# Patient Record
Sex: MALE | Race: WHITE | NOT HISPANIC OR LATINO | Employment: OTHER | URBAN - METROPOLITAN AREA
[De-identification: names, ages, dates, MRNs, and addresses within clinical notes are randomized per-mention and may not be internally consistent; named-entity substitution may affect disease eponyms.]

---

## 2017-01-06 ENCOUNTER — HISTORICAL (OUTPATIENT)
Dept: ADMINISTRATIVE | Facility: HOSPITAL | Age: 72
End: 2017-01-06

## 2017-01-06 LAB
ALBUMIN SERPL-MCNC: 3.9 G/DL (ref 3.1–4.7)
ALP SERPL-CCNC: 60 IU/L (ref 40–104)
ALT (SGPT): 15 IU/L (ref 3–33)
AST SERPL-CCNC: 22 IU/L (ref 10–40)
BILIRUB SERPL-MCNC: 0.7 MG/DL (ref 0.3–1)
BUN SERPL-MCNC: 19 MG/DL (ref 8–20)
CALCIUM SERPL-MCNC: 9.1 MG/DL (ref 7.7–10.4)
CHLORIDE: 101 MMOL/L (ref 98–110)
CO2 SERPL-SCNC: 27.5 MMOL/L (ref 22.8–31.6)
COMPLEXED PSA SERPL-MCNC: 1.6 NG/ML (ref 0–3)
CREATININE RANDOM URINE: 154 MG/DL
CREATININE: 1.35 MG/DL (ref 0.6–1.4)
GLUCOSE: 98 MG/DL (ref 70–99)
MICROALBUM.,U,RANDOM: 6.1 MCG/ML (ref 0–19.9)
MICROALBUMIN/CREATININE RATIO: 4 (ref 0–30)
POTASSIUM SERPL-SCNC: 3.9 MMOL/L (ref 3.5–5)
PROT SERPL-MCNC: 6.8 G/DL (ref 6–8.2)
SODIUM: 138 MMOL/L (ref 134–144)
TSH SERPL DL<=0.005 MIU/L-ACNC: 3.41 ULU/ML (ref 0.3–5.6)

## 2017-06-26 RX ORDER — LEVOTHYROXINE SODIUM 100 UG/1
100 TABLET ORAL DAILY
Qty: 90 TABLET | Refills: 3 | Status: SHIPPED | OUTPATIENT
Start: 2017-06-26 | End: 2018-06-29 | Stop reason: SDUPTHER

## 2017-06-26 RX ORDER — LOSARTAN POTASSIUM 50 MG/1
1 TABLET ORAL DAILY
COMMUNITY
Start: 2017-01-09 | End: 2017-08-20 | Stop reason: SDUPTHER

## 2017-06-26 RX ORDER — LEVOTHYROXINE SODIUM 100 UG/1
1 TABLET ORAL DAILY
COMMUNITY
Start: 2017-01-09 | End: 2017-06-26 | Stop reason: SDUPTHER

## 2017-07-12 ENCOUNTER — OFFICE VISIT (OUTPATIENT)
Dept: FAMILY MEDICINE | Facility: CLINIC | Age: 72
End: 2017-07-12
Payer: COMMERCIAL

## 2017-07-12 VITALS
BODY MASS INDEX: 23.7 KG/M2 | SYSTOLIC BLOOD PRESSURE: 125 MMHG | HEIGHT: 69 IN | HEART RATE: 74 BPM | WEIGHT: 160 LBS | DIASTOLIC BLOOD PRESSURE: 73 MMHG

## 2017-07-12 DIAGNOSIS — Z11.59 NEED FOR HEPATITIS C SCREENING TEST: ICD-10-CM

## 2017-07-12 DIAGNOSIS — Z12.5 SCREENING FOR PROSTATE CANCER: ICD-10-CM

## 2017-07-12 DIAGNOSIS — I10 BENIGN ESSENTIAL HYPERTENSION: Primary | ICD-10-CM

## 2017-07-12 DIAGNOSIS — E78.00 HYPERCHOLESTEROLEMIA: ICD-10-CM

## 2017-07-12 DIAGNOSIS — E03.9 ACQUIRED HYPOTHYROIDISM: ICD-10-CM

## 2017-07-12 PROBLEM — R60.0 EDEMA OF EXTREMITIES: Status: ACTIVE | Noted: 2017-07-12

## 2017-07-12 PROBLEM — J30.1 HAY FEVER: Status: ACTIVE | Noted: 2017-07-12

## 2017-07-12 PROBLEM — D51.0 ADDISON ANEMIA: Status: ACTIVE | Noted: 2017-07-12

## 2017-07-12 PROBLEM — Z78.9 NON-SMOKER: Status: ACTIVE | Noted: 2017-07-12

## 2017-07-12 PROCEDURE — 1126F AMNT PAIN NOTED NONE PRSNT: CPT | Mod: ,,, | Performed by: INTERNAL MEDICINE

## 2017-07-12 PROCEDURE — 1159F MED LIST DOCD IN RCRD: CPT | Mod: ,,, | Performed by: INTERNAL MEDICINE

## 2017-07-12 PROCEDURE — 99213 OFFICE O/P EST LOW 20 MIN: CPT | Mod: ,,, | Performed by: INTERNAL MEDICINE

## 2017-07-12 NOTE — PATIENT INSTRUCTIONS
Taking Your Blood Pressure  Blood pressure is the force of blood against the artery wall as it moves from the heart through the blood vessels. You can take your own blood pressure reading using a digital monitor. Take readings as often as your healthcare provider instructs. Take each reading at the same time of day.  Step 1. Relax    · Take your blood pressure at the same time every day, such as in the morning or evening, or at the time your healthcare provider recommends.  · Wait at least a half-hour after smoking, eating, drinking caffeinated beverages, or exercising.  · Sit comfortably at a table with both feet on the floor. Do not cross your legs or feet. Place the monitor near you.  · Rest for a few minutes before you begin.  Step 2. Wrap the cuff    · Place your arm on the table, palm up. Your arm should be at the level of your heart. Wrap the cuff around your upper arm, just above your elbow. Its best done on bare skin, not over clothing. Most cuffs will indicate where the brachial artery (the blood vessel in the middle of the arm at the inner side of the elbow) should line up with the cuff. Look in your monitor's instruction booklet for an illustration. You can also bring your cuff to your healthcare provider and have them show you how to correctly place the cuff.  · Make sure your cuff fits. If it doesnt wrap around your upper arm, order a larger cuff.  Step 3. Inflate the cuff    · Pump the cuff until the scale reads 160. If you have a self-inflating cuff, push the button that starts the pump.  · The cuff will tighten, then loosen.  · The numbers will change. When they stop changing, your blood pressure reading will appear.  · Take 2 or 3 readings one minute apart.  Step 4. Write down the results of each reading    · Write down your blood pressure numbers for each reading. Note the date and time. Keep your results in one place, such as a notebook. Even if your monitor has a built-in memory, keep a hard  copy of the readings.  · Remove the cuff from your arm. Turn off the machine.  · Share your blood pressure records with your healthcare providers at each visit.  Date Last Reviewed: 4/27/2016  © 7449-0734 [employer]. [address] [place] [address], [place] [address]. All rights reserved. This information is not intended as a substitute for professional medical care. Always follow your healthcare professional's instructions.

## 2017-07-12 NOTE — PROGRESS NOTES
Subjective:       Patient ID: Jaswinder Reina is a 72 y.o. male.    Chief Complaint: Hypertension and Hypothyroidism    Hypertension   This is a chronic (10 yrs) problem. The current episode started more than 1 year ago. The problem has been gradually improving since onset. The problem is controlled. Pertinent negatives include no anxiety, blurred vision, chest pain, malaise/fatigue, neck pain, palpitations, PND or shortness of breath. There are no associated agents to hypertension. Risk factors for coronary artery disease include male gender. Past treatments include angiotensin blockers. There are no compliance problems.  Hypertensive end-organ damage includes a thyroid problem. There is no history of CAD/MI, CVA, left ventricular hypertrophy or renovascular disease. There is no history of chronic renal disease, hypercortisolism, a hypertension causing med, pheochromocytoma or sleep apnea.   Thyroid Problem   Presents for follow-up visit. Patient reports no anxiety, cold intolerance, constipation, diaphoresis, diarrhea, fatigue, heat intolerance, hoarse voice, leg swelling, palpitations, tremors or visual change. The symptoms have been stable.       Past Medical History:   Diagnosis Date    Hyperlipidemia     Hypertension     Hypothyroidism      Social History     Social History    Marital status: Single     Spouse name: N/A    Number of children: N/A    Years of education: N/A     Occupational History    Not on file.     Social History Main Topics    Smoking status: Former Smoker    Smokeless tobacco: Never Used    Alcohol use Yes    Drug use: No    Sexual activity: Yes     Partners: Female     Other Topics Concern    Not on file     Social History Narrative    No narrative on file     Past Surgical History:   Procedure Laterality Date    TONSILLECTOMY       Family History   Problem Relation Age of Onset    Hypertension Father        Review of Systems   Constitutional: Negative for activity  "change, appetite change, diaphoresis, fatigue, malaise/fatigue and unexpected weight change.   HENT: Negative for congestion, hoarse voice, sneezing and trouble swallowing.    Eyes: Negative for blurred vision, pain and visual disturbance.   Respiratory: Negative for cough, chest tightness and shortness of breath.    Cardiovascular: Negative for chest pain, palpitations, leg swelling and PND.        Patient has hypertension. He also has dyslipidemia but is not on any medications for that.   Gastrointestinal: Negative for abdominal distention, abdominal pain, blood in stool, constipation and diarrhea.        Patient complains of flatulence.   Endocrine: Negative for cold intolerance, heat intolerance, polydipsia, polyphagia and polyuria.        Patient has underlying hypothyroidism.   Genitourinary: Negative for dysuria, hematuria and scrotal swelling.   Musculoskeletal: Positive for arthralgias. Negative for back pain, gait problem and neck pain.        Patient complains of joint pains in the fingers and hands. He takes Aleve occasionally which gives him reasonable relief at least before he plays golf.   Skin: Negative for pallor, rash and wound.   Allergic/Immunologic: Negative for environmental allergies, food allergies and immunocompromised state.   Neurological: Negative for dizziness, tremors, seizures, speech difficulty, light-headedness and numbness.   Hematological: Negative for adenopathy. Does not bruise/bleed easily.   Psychiatric/Behavioral: Negative for agitation, behavioral problems and confusion. The patient is not nervous/anxious.        Objective:       Vitals:    07/12/17 0839   BP: 125/73   Pulse: 74   Weight: 72.6 kg (160 lb)   Height: 5' 9" (1.753 m)     Physical Exam   Constitutional: He is oriented to person, place, and time. He appears well-developed.   HENT:   Head: Normocephalic and atraumatic.   Nose: Nose normal.   Mouth/Throat: Oropharynx is clear and moist. No oropharyngeal exudate. "   Eyes: Conjunctivae and EOM are normal.   Neck: Normal range of motion. Neck supple. No JVD present. No tracheal deviation present. No thyromegaly present.   Cardiovascular: Normal rate, regular rhythm and normal heart sounds.  Exam reveals no gallop and no friction rub.    No murmur heard.  Pulmonary/Chest: Effort normal and breath sounds normal. No respiratory distress. He has no wheezes. He has no rales.   Abdominal: Soft. Bowel sounds are normal. He exhibits no distension. There is no tenderness.   Musculoskeletal: Normal range of motion.   Neurological: He is alert and oriented to person, place, and time.   Skin: Skin is warm and dry.   Psychiatric: He has a normal mood and affect.   Nursing note and vitals reviewed.      Assessment:       1. Benign essential hypertension    2. Hypercholesterolemia    3. Acquired hypothyroidism    4. Screening for prostate cancer    5. Need for hepatitis C screening test         Plan:           Benign essential hypertension  -     Comprehensive metabolic panel; Future; Expected date: 07/12/2017  -     Microalbumin/creatinine urine ratio; Future; Expected date: 07/12/2017    Hypercholesterolemia  -     Lipid panel; Future; Expected date: 07/12/2017    Acquired hypothyroidism  -     TSH; Future; Expected date: 07/12/2017    Screening for prostate cancer  -     PSA, Screening; Future; Expected date: 07/12/2017    Need for hepatitis C screening test  -     Hepatitis C antibody; Future; Expected date: 01/11/2018    Patient is doing fairly well except for mild arthritic pains for which he takes an occasional Aleve. He is busy and active playing golf couple of times a week.    I will see Mr. Reina in 6 months time and review the labs.    He also complains of some flatulence and gas and have advised him to check for labels and check for any artificial sweeteners.    He can also stop lactose containing products for approximately a week and see if it makes any difference.

## 2017-08-20 RX ORDER — LOSARTAN POTASSIUM 50 MG/1
TABLET ORAL
Qty: 90 TABLET | Refills: 3 | Status: SHIPPED | OUTPATIENT
Start: 2017-08-20 | End: 2018-09-02 | Stop reason: SDUPTHER

## 2018-01-10 LAB
ALBUMIN SERPL-MCNC: 4.1 G/DL (ref 3.1–4.7)
ALP SERPL-CCNC: 55 IU/L (ref 40–104)
ALT (SGPT): 28 IU/L (ref 3–33)
AST SERPL-CCNC: 33 IU/L (ref 10–40)
BILIRUB SERPL-MCNC: 1 MG/DL (ref 0.3–1)
BUN SERPL-MCNC: 22 MG/DL (ref 8–20)
CALCIUM SERPL-MCNC: 9 MG/DL (ref 7.7–10.4)
CHLORIDE: 102 MMOL/L (ref 98–110)
CO2 SERPL-SCNC: 27.1 MMOL/L (ref 22.8–31.6)
COMPLEXED PSA SERPL-MCNC: 2.6 NG/ML (ref 0–3)
CREATININE RANDOM URINE: 180 MG/DL
CREATININE: 1.5 MG/DL (ref 0.6–1.4)
GLUCOSE: 93 MG/DL (ref 70–99)
MICROALBUM.,U,RANDOM: 5.7 MCG/ML (ref 0–19.9)
MICROALBUMIN/CREATININE RATIO: 3 (ref 0–30)
POTASSIUM SERPL-SCNC: 4 MMOL/L (ref 3.5–5)
PROT SERPL-MCNC: 7.2 G/DL (ref 6–8.2)
SODIUM: 137 MMOL/L (ref 134–144)
T4 FREE SP9 P CHAL SERPL-SCNC: 0.99 NG/DL (ref 0.45–1.27)
TSH SERPL DL<=0.005 MIU/L-ACNC: 5.98 ULU/ML (ref 0.3–5.6)

## 2018-01-11 ENCOUNTER — TELEPHONE (OUTPATIENT)
Dept: FAMILY MEDICINE | Facility: CLINIC | Age: 73
End: 2018-01-11

## 2018-01-11 LAB — HCV AB SERPL QL IA: <0.1 S/CO RATIO (ref 0–0.9)

## 2018-01-11 NOTE — TELEPHONE ENCOUNTER
----- Message from Kameron Akhtar MD sent at 1/10/2018  3:54 PM CST -----  Keep followup to discuss slightly abnormal labs including thyroid and kidney

## 2018-01-15 ENCOUNTER — OFFICE VISIT (OUTPATIENT)
Dept: FAMILY MEDICINE | Facility: CLINIC | Age: 73
End: 2018-01-15
Payer: COMMERCIAL

## 2018-01-15 VITALS
HEIGHT: 69 IN | HEART RATE: 70 BPM | DIASTOLIC BLOOD PRESSURE: 69 MMHG | SYSTOLIC BLOOD PRESSURE: 138 MMHG | BODY MASS INDEX: 23.55 KG/M2 | WEIGHT: 159 LBS

## 2018-01-15 DIAGNOSIS — I10 BENIGN ESSENTIAL HYPERTENSION: Primary | ICD-10-CM

## 2018-01-15 DIAGNOSIS — E03.8 OTHER SPECIFIED HYPOTHYROIDISM: ICD-10-CM

## 2018-01-15 PROCEDURE — 99213 OFFICE O/P EST LOW 20 MIN: CPT | Mod: ,,, | Performed by: INTERNAL MEDICINE

## 2018-01-15 NOTE — PATIENT INSTRUCTIONS
Hypothyroidism       You have hypothyroidism. This means your thyroid gland is not making enough thyroid hormone. This hormone is vital to body growth and metabolism. If you dont make enough, many body processes slow down. This can cause symptoms throughout the body. Hypothyroidism can range from mild to severe. The most severe form is called myxedema.  There are a number of causes of hypothyroidism. A common cause is Hashimotos disease. This disease causes the bodys own immune system to attack the thyroid gland. When you have certain treatments, such as surgery to remove the thyroid gland, this can also cause hypothyroidism.  Symptoms of hypothyroidism can include:  · Fatigue  · Trouble concentrating or thinking clearly; forgetfulness  · Dry skin  · Hair loss  · Weight gain  · Low tolerance to cold  · Constipation  · Depression  · Personality changes  · Tingling or prickling of the hands or feet  · Heavy, absent, or irregular periods (women only)  Older adults may sometimes have other symptoms. These can include:  · Muscle aches and weakness  · Confusion  · Incontinence (unable to control urine or stool)  · Trouble moving around  · Falling  Treatment for hypothyroidism involves taking thyroid hormone pills daily. These pills replace the hormone your thyroid doesnt make. You will likely need to take a daily pill for the rest of your life. Tips for taking this medicine are given below.  Home care  Tips for taking your medicine  · Take your thyroid hormone pills as prescribed by your healthcare provider. This is most often 1 pill a day on an empty stomach. Use a pillbox labeled with the days of the week. This will help you remember to take your pill each day.  · Dont take products that contain iron and calcium or antacids within 4 hours of taking your thyroid hormone pills.  · Dont take other medicines with your thyroid hormone pill without checking with your provider first.  · Tell your provider if you have  any side effects from your medicines that bother you.  · Never change the dosage or stop taking your thyroid pills without talking to your provider first.  General care  · Always talk with your provider before trying other medicines or treatments for your thyroid problem.  · If you see other healthcare providers, be sure to let them know about your thyroid problem.  Follow-up care  See your healthcare provider for checkups as advised. You may need regular tests to check the level of thyroid hormone in your blood.  When to seek medical advice  Call your healthcare provider right away if any of these occur:  · New symptoms develop  · Symptoms return, continue, or worsen even after treatment  · Extreme fatigue  · Puffy hands, face, or feet  · Fast or irregular heartbeat  · Confusion  Call 911  Call 911 right away if any of these occur:  · Fainting  · Chest pain  · Shortness of breath or trouble breathing  Date Last Reviewed: 8/24/2015  © 2545-7822 [employer]. [address] [place] [address], [place] [address]. All rights reserved. This information is not intended as a substitute for professional medical care. Always follow your healthcare professional's instructions.

## 2018-01-15 NOTE — PROGRESS NOTES
Subjective:       Patient ID: Jaswinder Reina is a 72 y.o. male.    Chief Complaint: Hypertension (lab review ) and Thyroid Problem    Mr. Jaswinder Reina is a pleasant 72-year-old  male who comes for follow-up. He has underlying hypertension and hypothyroidism. He is compliant with his medications for blood pressure as well as the thyroid.    Generally, on overall basis he feels good and he is active participant playing golf.    His exercise tolerance is fair. He states that his memory is reasonable for his accomplishments and age. He does have mild arthritic symptoms which is able to tolerate well and sometimes it disturbs his sleep at night. He tends to put a wrist brace which gives him some relief.    I recently checked his labs which are within reasonable range including hepatitis C screening. His TSH is slightly elevated with a free normal free T4. I will continue with the same medications for another 6 months to one year before I make any decision to change.    Patient's labs also include creatinine which is 1.5. This is slightly elevated. We will keep an eye on this level.      Hypertension   This is a chronic problem. The current episode started more than 1 year ago. The problem has been gradually improving since onset. Pertinent negatives include no anxiety, chest pain, malaise/fatigue, neck pain, palpitations, peripheral edema or shortness of breath. There are no associated agents to hypertension. There are no known risk factors for coronary artery disease. Past treatments include angiotensin blockers. Identifiable causes of hypertension include a thyroid problem. There is no history of chronic renal disease, coarctation of the aorta or pheochromocytoma.   Thyroid Problem   Presents for follow-up visit. Symptoms include cold intolerance (better now). Patient reports no anxiety, constipation, depressed mood, diaphoresis, diarrhea, fatigue, hair loss, heat intolerance, hoarse voice, leg swelling,  palpitations, tremors or visual change.       Past Medical History:   Diagnosis Date    Hyperlipidemia     Hypertension     Hypothyroidism      Social History     Social History    Marital status: Single     Spouse name: N/A    Number of children: N/A    Years of education: N/A     Occupational History    Not on file.     Social History Main Topics    Smoking status: Former Smoker    Smokeless tobacco: Never Used    Alcohol use Yes    Drug use: No    Sexual activity: Yes     Partners: Female     Other Topics Concern    Not on file     Social History Narrative    No narrative on file     Past Surgical History:   Procedure Laterality Date    TONSILLECTOMY       Family History   Problem Relation Age of Onset    Hypertension Father        Review of Systems   Constitutional: Negative for activity change, appetite change, diaphoresis, fatigue, malaise/fatigue and unexpected weight change.   HENT: Negative for congestion, hoarse voice, sneezing and trouble swallowing.    Eyes: Negative for pain and visual disturbance.   Respiratory: Negative for cough, chest tightness and shortness of breath.    Cardiovascular: Negative for chest pain, palpitations and leg swelling.        Patient has hypertension. He also has dyslipidemia but is not on any medications for that.   Gastrointestinal: Negative for abdominal distention, abdominal pain, blood in stool, constipation and diarrhea.        Patient complains of flatulence. Now better.   Endocrine: Positive for cold intolerance (better now). Negative for heat intolerance, polydipsia, polyphagia and polyuria.        Patient has underlying hypothyroidism.   Genitourinary: Negative for dysuria, hematuria and scrotal swelling.   Musculoskeletal: Positive for arthralgias. Negative for back pain, gait problem and neck pain.        Patient complains of joint pains in the fingers and hands. He takes Aleve occasionally which gives him reasonable relief at least before he plays  "golf.   Skin: Negative for pallor, rash and wound.   Allergic/Immunologic: Negative for environmental allergies, food allergies and immunocompromised state.   Neurological: Negative for dizziness, tremors, seizures, speech difficulty, light-headedness and numbness.   Hematological: Negative for adenopathy. Does not bruise/bleed easily.   Psychiatric/Behavioral: Negative for agitation, behavioral problems and confusion. The patient is not nervous/anxious.        Objective:       Vitals:    01/15/18 1534   BP: 138/69   Pulse: 70   Weight: 72.1 kg (159 lb)   Height: 5' 9" (1.753 m)     Physical Exam   Constitutional: He appears well-developed.   HENT:   Head: Normocephalic and atraumatic.   Nose: Nose normal.   Mouth/Throat: Oropharynx is clear and moist. No oropharyngeal exudate.   Eyes: Conjunctivae and EOM are normal.   Neck: Normal range of motion. Neck supple. No JVD present. No tracheal deviation present. No thyromegaly present.   Cardiovascular: Normal rate, regular rhythm and normal heart sounds.  Exam reveals no gallop and no friction rub.    No murmur heard.  Pulmonary/Chest: Effort normal and breath sounds normal. No respiratory distress. He has no wheezes. He has no rales.   Abdominal: Soft. Bowel sounds are normal. He exhibits no distension. There is no tenderness.   Musculoskeletal: Normal range of motion.   Neurological: He is alert.   Skin: Skin is warm and dry.   Psychiatric: He has a normal mood and affect.   Nursing note and vitals reviewed.      Assessment:       1. Benign essential hypertension    2. Other specified hypothyroidism         Plan:           Benign essential hypertension  -     Basic metabolic panel; Future; Expected date: 01/15/2018    Other specified hypothyroidism  -     TSH; Future; Expected date: 01/15/2018      Current Outpatient Prescriptions on File Prior to Visit   Medication Sig Dispense Refill    levothyroxine (SYNTHROID) 100 MCG tablet Take 1 tablet (100 mcg total) by " mouth once daily at 6am. 90 tablet 3    losartan (COZAAR) 50 MG tablet 1 EVERY EVENING 90 tablet 3     No current facility-administered medications on file prior to visit.      Patient's medications have been reviewed. His labs have been reviewed. TSH is slightly elevated but will continue with the same prescription. If necessary I might have to increase it subsequently in case the TSH shows a upward trend.    I will see him back in 6 months time and repeat a TSH.    In the meantime I complement him for continuing his exercise and playing golf. Creatinine was also slightly elevated and I'll encourage him to continue remain hydrated.

## 2018-06-29 RX ORDER — LEVOTHYROXINE SODIUM 100 UG/1
100 TABLET ORAL
Qty: 90 TABLET | Refills: 3 | Status: SHIPPED | OUTPATIENT
Start: 2018-06-29 | End: 2019-06-30 | Stop reason: SDUPTHER

## 2018-07-13 LAB
BUN SERPL-MCNC: 29 MG/DL (ref 8–20)
CALCIUM SERPL-MCNC: 8.9 MG/DL (ref 7.7–10.4)
CHLORIDE: 105 MMOL/L (ref 98–110)
CO2 SERPL-SCNC: 24.9 MMOL/L (ref 22.8–31.6)
CREATININE: 1.67 MG/DL (ref 0.6–1.4)
GLUCOSE: 90 MG/DL (ref 70–99)
POTASSIUM SERPL-SCNC: 4.2 MMOL/L (ref 3.5–5)
SODIUM: 139 MMOL/L (ref 134–144)
TSH SERPL DL<=0.005 MIU/L-ACNC: 0.32 ULU/ML (ref 0.3–5.6)

## 2018-07-18 ENCOUNTER — OFFICE VISIT (OUTPATIENT)
Dept: FAMILY MEDICINE | Facility: CLINIC | Age: 73
End: 2018-07-18
Payer: COMMERCIAL

## 2018-07-18 VITALS
BODY MASS INDEX: 22.66 KG/M2 | SYSTOLIC BLOOD PRESSURE: 125 MMHG | WEIGHT: 153 LBS | HEART RATE: 62 BPM | HEIGHT: 69 IN | DIASTOLIC BLOOD PRESSURE: 74 MMHG | RESPIRATION RATE: 16 BRPM | OXYGEN SATURATION: 97 %

## 2018-07-18 DIAGNOSIS — N28.9 ABNORMAL KIDNEY FUNCTION: ICD-10-CM

## 2018-07-18 DIAGNOSIS — E03.8 OTHER SPECIFIED HYPOTHYROIDISM: ICD-10-CM

## 2018-07-18 DIAGNOSIS — I10 BENIGN ESSENTIAL HYPERTENSION: Primary | ICD-10-CM

## 2018-07-18 PROCEDURE — 3074F SYST BP LT 130 MM HG: CPT | Mod: ,,, | Performed by: INTERNAL MEDICINE

## 2018-07-18 PROCEDURE — 3078F DIAST BP <80 MM HG: CPT | Mod: ,,, | Performed by: INTERNAL MEDICINE

## 2018-07-18 PROCEDURE — 99213 OFFICE O/P EST LOW 20 MIN: CPT | Mod: ,,, | Performed by: INTERNAL MEDICINE

## 2018-07-18 NOTE — PATIENT INSTRUCTIONS
Living with High Blood Pressure and Kidney Disease  By lowering high blood pressure, you can reduce the amount of damage to your kidneys, and help slow any progression of kidney disease. Visit your healthcare provider as scheduled and follow the tips below.    Eat right  To control blood pressure and kidney disease:  · Limit salt (sodium) intake. Your sodium limit is 1,500 mg a day. Sodium makes up 40% of table salt, which is also called sodium chloride. So 1,500 mg of sodium equals 3,800 mg of salt. It's very important to read and understand this difference when reading food labels. The following guide will make it easy to understand how much sodium is in different amounts of salt:  ¨ 1/4 teaspoon salt = 600 mg sodium  ¨ 1/2 teaspoon salt = 1,200 mg sodium  ¨ 3/4 teaspoon salt = 1,800 mg sodium  ¨ 1 teaspoon salt = 2,400 mg sodium  · Cook with spices and herbs instead of salt.  · Eat fresh foods instead of canned or processed ones.  · Eat less fat. Avoid fats that come from animal sources.  · Choose low-fat dairy foods.  You may also need to  · Eat less protein.  · Drink less fluid.  · Eat foods that are low in phosphorus and potassium.  Stay active  Regular activity helps reduce high blood pressure. For best results:  · Talk with your healthcare provider before starting a fitness program. Your provider may be able to suggest activities that will help you feel your best.  · Ask your healthcare provider how often you should exercise and for how long.  · Try to exercise at the same time each day.  Date Last Reviewed: 2/1/2017  © 6891-3757 [employer]. [address] [place] [address], [place] [address]. All rights reserved. This information is not intended as a substitute for professional medical care. Always follow your healthcare professional's instructions.

## 2018-07-18 NOTE — PROGRESS NOTES
Subjective:       Patient ID: Jaswinder Reina is a 73 y.o. male.    Chief Complaint: Results; Hypertension; and Thyroid Problem    Mr. Jaswinder Reina is a pleasant 72-year-old  male who comes for follow-up. He has underlying hypertension and hypothyroidism. He is compliant with his medications for blood pressure as well as the thyroid.    Generally, on overall basis he feels good and he is active participant playing golf.    His exercise tolerance is fair. He states that his memory is reasonable for his accomplishments and age. He does have mild arthritic symptoms which is able to tolerate well and sometimes it disturbs his sleep at night. He tends to put a wrist brace which gives him some relief.    I recently checked his labs which are within reasonable range including hepatitis C screening. His TSH is slightly elevated with a free normal free T4. I will continue with the same medications for another 6 months to one year before I make any decision to change.    Patient's labs also include creatinine which is 1.5. This is slightly elevated. We will keep an eye on this level.      Hypertension   This is a chronic problem. The current episode started more than 1 year ago. The problem has been gradually improving since onset. The problem is controlled. Pertinent negatives include no anxiety, chest pain, malaise/fatigue, neck pain, palpitations, peripheral edema or shortness of breath. There are no associated agents to hypertension. Risk factors for coronary artery disease include sedentary lifestyle and male gender. Past treatments include angiotensin blockers. Identifiable causes of hypertension include chronic renal disease and a thyroid problem. There is no history of coarctation of the aorta or pheochromocytoma.   Thyroid Problem   Presents for follow-up (Recent TSH is within normal range.) visit. Symptoms include cold intolerance (better now). Patient reports no anxiety, constipation, depressed mood,  "diaphoresis, diarrhea, fatigue, hair loss, heat intolerance, hoarse voice, leg swelling, palpitations, tremors or visual change. The symptoms have been stable.       Past Medical History:   Diagnosis Date    Hyperlipidemia     Hypertension     Hypothyroidism      Social History     Social History    Marital status: Single     Spouse name: N/A    Number of children: N/A    Years of education: N/A     Occupational History    Not on file.     Social History Main Topics    Smoking status: Former Smoker    Smokeless tobacco: Never Used    Alcohol use Yes    Drug use: No    Sexual activity: Yes     Partners: Female     Other Topics Concern    Not on file     Social History Narrative    No narrative on file     Past Surgical History:   Procedure Laterality Date    TONSILLECTOMY       Family History   Problem Relation Age of Onset    Hypertension Father        Review of Systems   Constitutional: Negative for diaphoresis, fatigue and malaise/fatigue.   HENT: Negative for hoarse voice.    Respiratory: Negative for shortness of breath.    Cardiovascular: Negative for chest pain and palpitations.   Gastrointestinal: Negative for constipation and diarrhea.   Endocrine: Positive for cold intolerance (better now). Negative for heat intolerance.   Musculoskeletal: Negative for neck pain.   Neurological: Negative for tremors.   Psychiatric/Behavioral: The patient is not nervous/anxious.        Objective:       Vitals:    07/18/18 0942   BP: 125/74   Pulse: 62   Resp: 16   SpO2: 97%   Weight: 69.4 kg (153 lb)   Height: 5' 9" (1.753 m)     Physical Exam   Constitutional: He appears well-developed.   HENT:   Head: Normocephalic and atraumatic.   Nose: Nose normal.   Mouth/Throat: Oropharynx is clear and moist. No oropharyngeal exudate.   Eyes: Conjunctivae and EOM are normal.   Neck: Normal range of motion. Neck supple. No JVD present. No tracheal deviation present. No thyromegaly present.   Cardiovascular: Normal rate, " regular rhythm and normal heart sounds.  Exam reveals no gallop and no friction rub.    No murmur heard.  Pulmonary/Chest: Effort normal and breath sounds normal. No respiratory distress. He has no wheezes. He has no rales.   Abdominal: Soft. Bowel sounds are normal. He exhibits no distension. There is no tenderness.   Neurological: He is alert.   Skin: Skin is warm and dry.   Psychiatric: He has a normal mood and affect.   Nursing note and vitals reviewed.      Assessment:       1. Benign essential hypertension    2. Other specified hypothyroidism    3. Abnormal kidney function         Ref Range & Units 5d ago 6mo ago 1yr ago 9yr ago 10yr ago 12yr ago 13yr ago     TSH 0.30 - 5.60 ulU/ml 0.32  5.98   3.41  0.971R  1.5R  3.3R  1.1R    Basic metabolic panel   Order: [id number]   Status:  Final result   Visible to patient:  No (Not Released)   Next appt:  None    Ref Range & Units 5d ago   Glucose 70 - 99 mg/dL 90    BUN, Bld 8 - 20 mg/dL 29     Creatinine 0.60 - 1.40 mg/dL 1.67     Calcium 7.7 - 10.4 mg/dL 8.9    Sodium 134 - 144 mmol/L 139    Potassium 3.5 - 5.0 mmol/L 4.2    Chloride 98 - 110 mmol/L 105    CO2 22.8 - 31.6 mmol/L 24.9    Resulting Agency  [facility], Screening   Order: [id number] - Reflex for Order [id number]   Status:  Final result   Visible to patient:  No (Not Released) Next appt:  None   Notes Recorded by Kameron Akhtar MD on 1/10/2018 at 3:54 PM CST  Keep followup to discuss slightly abnormal labs including thyroid and kidney     Ref Range & Units 6mo ago 1yr ago 9yr ago 10yr ago 12yr ago 13yr ago 14yr ago    PSA, SCREEN 0.0 - 3.0 ng/mL 2.6  1.6  1.1R, CM  0.8R  1.2R  1.1R  1.0R                 Plan:           Benign essential hypertension    Other specified hypothyroidism    Abnormal kidney function    Mr. Reina is generally doing okay. His blood pressures are under fair control. He is compliant with his medications including the thyroid medications. His TSH is within range.    His  kidney functions do show some elevation.    His PSA also had shown some elevation but he does not have any prostate symptoms.    Clinical examination is okay.      I will repeat the kidney functions and 3 time with intact PTH and phosphorus level and see how he does.    In the meantime he'll keep himself hydrated.      He inquires me about the new shingrix vaccine and he had the shingles vaccine in past.    Official the the new shingles vaccine is accepted and prescribed but I will reserve my personal opinion at this point.    He will also be due for colonoscopy given the last one in 5 years. I would like to wait for another 3 months to 6 months before we are sure about his kidney functions.    I'm okay to see him in about 6 months time for follow-up but I would like him to check his labs in 3 months time.

## 2018-09-02 RX ORDER — LOSARTAN POTASSIUM 50 MG/1
TABLET ORAL
Qty: 90 TABLET | Refills: 3 | Status: SHIPPED | OUTPATIENT
Start: 2018-09-02 | End: 2019-09-15 | Stop reason: SDUPTHER

## 2018-10-25 LAB
ALBUMIN SERPL-MCNC: 4 G/DL (ref 3.1–4.7)
BUN SERPL-MCNC: 27 MG/DL (ref 8–20)
CALCIUM SERPL-MCNC: 9.3 MG/DL (ref 7.7–10.4)
CHLORIDE: 102 MMOL/L (ref 98–110)
CO2 SERPL-SCNC: 28.7 MMOL/L (ref 22.8–31.6)
CREATININE: 1.53 MG/DL (ref 0.6–1.4)
GLUCOSE: 89 MG/DL (ref 70–99)
PHOSPHATE FLD-MCNC: 4.3 MG/DL (ref 2.5–4.9)
POTASSIUM SERPL-SCNC: 4.7 MMOL/L (ref 3.5–5)
SODIUM: 136 MMOL/L (ref 134–144)

## 2019-04-01 ENCOUNTER — OFFICE VISIT (OUTPATIENT)
Dept: FAMILY MEDICINE | Facility: CLINIC | Age: 74
End: 2019-04-01
Payer: COMMERCIAL

## 2019-04-01 VITALS
HEART RATE: 80 BPM | WEIGHT: 158 LBS | DIASTOLIC BLOOD PRESSURE: 76 MMHG | BODY MASS INDEX: 23.4 KG/M2 | HEIGHT: 69 IN | SYSTOLIC BLOOD PRESSURE: 159 MMHG

## 2019-04-01 DIAGNOSIS — N28.9 ABNORMAL KIDNEY FUNCTION: ICD-10-CM

## 2019-04-01 DIAGNOSIS — E03.9 ACQUIRED HYPOTHYROIDISM: ICD-10-CM

## 2019-04-01 DIAGNOSIS — E78.00 HYPERCHOLESTEROLEMIA: ICD-10-CM

## 2019-04-01 DIAGNOSIS — Z12.11 SCREEN FOR COLON CANCER: ICD-10-CM

## 2019-04-01 DIAGNOSIS — I10 BENIGN ESSENTIAL HYPERTENSION: Primary | ICD-10-CM

## 2019-04-01 PROCEDURE — 3078F PR MOST RECENT DIASTOLIC BLOOD PRESSURE < 80 MM HG: ICD-10-PCS | Mod: ,,, | Performed by: INTERNAL MEDICINE

## 2019-04-01 PROCEDURE — 3078F DIAST BP <80 MM HG: CPT | Mod: ,,, | Performed by: INTERNAL MEDICINE

## 2019-04-01 PROCEDURE — 3077F SYST BP >= 140 MM HG: CPT | Mod: ,,, | Performed by: INTERNAL MEDICINE

## 2019-04-01 PROCEDURE — 99214 PR OFFICE/OUTPT VISIT, EST, LEVL IV, 30-39 MIN: ICD-10-PCS | Mod: ,,, | Performed by: INTERNAL MEDICINE

## 2019-04-01 PROCEDURE — 99214 OFFICE O/P EST MOD 30 MIN: CPT | Mod: ,,, | Performed by: INTERNAL MEDICINE

## 2019-04-01 PROCEDURE — 1101F PT FALLS ASSESS-DOCD LE1/YR: CPT | Mod: ,,, | Performed by: INTERNAL MEDICINE

## 2019-04-01 PROCEDURE — 3077F PR MOST RECENT SYSTOLIC BLOOD PRESSURE >= 140 MM HG: ICD-10-PCS | Mod: ,,, | Performed by: INTERNAL MEDICINE

## 2019-04-01 PROCEDURE — 1101F PR PT FALLS ASSESS DOC 0-1 FALLS W/OUT INJ PAST YR: ICD-10-PCS | Mod: ,,, | Performed by: INTERNAL MEDICINE

## 2019-04-01 NOTE — PATIENT INSTRUCTIONS
Taking Your Blood Pressure  Blood pressure is the force of blood against the artery wall as it moves from the heart through the blood vessels. You can take your own blood pressure reading using a digital monitor. Take your readings the same each time, using the same arm. Take readings as often as your healthcare provider instructs.  About blood pressure monitors  Blood pressure monitors are designed for certain ages and cases. You can find monitors for older adults, for pregnant women, and for children. Make sure the one you choose is the right one for your age and situation.  The American Heart Association recommends an automatic cuff monitor that fits on your upper arm (bicep). The cuff should fit your arm size. A cuff thats too large or too small will not give an accurate reading. Measure around your upper arm to find your size.  Monitors that attach to your finger or wrist are not as accurate as monitors for your upper arm.  Ask your healthcare provider for help in choosing a monitor. Bring your monitor to your next provider visit if you need help in using it the correct way.  The steps below are general instructions for using an automatic digital monitor.  Step 1. Relax    · Take your blood pressure at the same time every day, such as in the morning or evening, or at the time your healthcare provider recommends.  · Wait at least a half-hour after smoking, eating, or exercising. Don't drink coffee, tea, soda, or other caffeinated beverages before checking your blood pressure.  · Sit comfortably at a table with both feet on the floor. Do not cross your legs or feet. Place the monitor near you.  · Rest for a few minutes before you begin.  Step 2. Wrap the cuff    · Place your arm on the table, palm up. Your arm should be at the level of your heart. Wrap the cuff around your upper arm, just above your elbow. Its best done on bare skin, not over clothing. Most cuffs will indicate where the brachial artery (the  blood vessel in the middle of the arm at the inner side of the elbow) should line up with the cuff. Look in your monitor's instruction booklet for an illustration. You can also bring your cuff to your healthcare provider and have them show you how to correctly place the cuff.  Step 3. Inflate the cuff    · Push the button that starts the pump.  · The cuff will tighten, then loosen.  · The numbers will change. When they stop changing, your blood pressure reading will appear.  · Take 2 or 3 readings one minute apart.  Step 4. Write down the results of each reading    · Write down your blood pressure numbers for each reading. Note the date and time. Keep your results in one place, such as a notebook. Even if your monitor has a built-in memory, keep a hard copy of the readings.  · Remove the cuff from your arm. Turn off the machine.  · Bring your blood pressure records with your healthcare providers at each visit.  · If you start a new blood pressure medicine, note the day you started the new medicine. Also note the day if you change the dose of your medicine. This information goes on your blood pressure recording sheet. This will help your healthcare provider monitor how well the medicine changes are working.  · Ask your healthcare provider what numbers should prompt you to call him or her. Also ask what numbers should prompt you to get help right away.  Date Last Reviewed: 11/1/2016 © 2000-2017 [employer]. [address] [place] [id number]. All rights reserved. This information is not intended as a substitute for professional medical care. Always follow your healthcare professional's instructions.        Diet for Chronic Kidney Disease  Following a special diet when you have kidney disease can help you stay as healthy as possible. Your healthcare provider or dietitian should make a special diet plan just for you.    Eating right  Here are some good eating rules to follow:  · Protein. Eating  protein is important for your body. But too much protein can put a strain on your kidneys. Eating less protein may slow the progression of chronic kidney disease. Foods high in protein include meat, fish, eggs, cheese, and other dairy products. A registered dietitian can help you plan a diet that has the right amount of protein for you.  · Sodium. Having too much salt in your diet can make your body hold onto (retain) water. Ask your provider or dietitian how much sodium per day you are allowed. This will help you avoid fluid buildup in your body (fluid retention). It can also help control high blood pressure. Learn to read food labels to know how much sodium is in one serving. Foods high in salt include processed meats, canned and boxed foods, sauces, salted chips and snacks, pickled foods, frozen dinners, and restaurant and fast food.  · Fluids. If you have advanced kidney disease, you will need to limit the water and fluids you drink. If you dont, then too much water will build up in your body. The exact amount of fluid you can drink depends on how well your kidneys are working. Ask your provider how much water you can safely drink each day.  · Potassium. In advanced kidney disease, your potassium level can go dangerously high. This affects your heart. It can cause an irregular heartbeat (arrhythmia). Ask your provider or dietitian if you should limit potassium in your diet. Foods high in potassium include dairy products (milk, yogurt, cheese), dried beans, bananas, oranges, potatoes, tomatoes, spinach, cantaloupe, honeydew melon, dried fruits, and nuts.   · Calcium. Calcium is important to build strong bones. But foods high in calcium are also high in phosphorus, which can take calcium from your bones. Limiting foods high in phosphorus will help keep calcium in your bones. Ask your provider how much calcium you should get each day.  · Phosphorus. In advanced kidney disease, your phosphorus level can go  dangerously high. This affects many systems in the body and can damage your heart. Limit your intake of phosphorus-rich foods. These include dried beans and peas, nuts, peanut butter, cocoa, beer, cola drinks, and dairy products.  Date Last Reviewed: 8/1/2016  © 7401-1368 [employer]. [address] [place] [address], [place] [address]. All rights reserved. This information is not intended as a substitute for professional medical care. Always follow your healthcare professional's instructions.

## 2019-04-01 NOTE — PROGRESS NOTES
Subjective:       Patient ID: Jaswinder Reina is a 74 y.o. male.    Chief Complaint: Thyroid Problem; Hypertension; and Hyperlipidemia    Hypertension   This is a chronic problem. The current episode started more than 1 year ago. The problem is uncontrolled. Pertinent negatives include no chest pain, neck pain, palpitations or shortness of breath. Risk factors for coronary artery disease include sedentary lifestyle. Past treatments include angiotensin blockers. Identifiable causes of hypertension include a thyroid problem. There is no history of coarctation of the aorta, hyperaldosteronism, pheochromocytoma or renovascular disease.   Thyroid Problem   Presents for follow-up visit. Symptoms include cold intolerance (better now). Patient reports no anxiety, constipation, diaphoresis, diarrhea, fatigue, heat intolerance, leg swelling, menstrual problem, palpitations, tremors or weight gain. The symptoms have been stable. His past medical history is significant for hyperlipidemia.   Hyperlipidemia   This is a chronic problem. The current episode started more than 1 year ago. The problem is controlled. Exacerbating diseases include obesity. Pertinent negatives include no chest pain or shortness of breath. He is currently on no antihyperlipidemic treatment. The current treatment provides no improvement of lipids. Risk factors for coronary artery disease include hypertension and dyslipidemia.       Past Medical History:   Diagnosis Date    Hyperlipidemia     Hypertension     Hypothyroidism      Social History     Socioeconomic History    Marital status: Single     Spouse name: Not on file    Number of children: Not on file    Years of education: Not on file    Highest education level: Not on file   Occupational History    Not on file   Social Needs    Financial resource strain: Not on file    Food insecurity:     Worry: Not on file     Inability: Not on file    Transportation needs:     Medical: Not on file      "Non-medical: Not on file   Tobacco Use    Smoking status: Former Smoker    Smokeless tobacco: Never Used   Substance and Sexual Activity    Alcohol use: Yes    Drug use: No    Sexual activity: Yes     Partners: Female   Lifestyle    Physical activity:     Days per week: Not on file     Minutes per session: Not on file    Stress: Only a little   Relationships    Social connections:     Talks on phone: Not on file     Gets together: Not on file     Attends Mu-ism service: Not on file     Active member of club or organization: Not on file     Attends meetings of clubs or organizations: Not on file     Relationship status: Not on file    Intimate partner violence:     Fear of current or ex partner: Not on file     Emotionally abused: Not on file     Physically abused: Not on file     Forced sexual activity: Not on file   Other Topics Concern    Not on file   Social History Narrative    Not on file     Past Surgical History:   Procedure Laterality Date    TONSILLECTOMY       Family History   Problem Relation Age of Onset    Hypertension Father        Review of Systems   Constitutional: Negative for diaphoresis, fatigue and weight gain.   Respiratory: Negative for shortness of breath.    Cardiovascular: Negative for chest pain and palpitations.   Gastrointestinal: Negative for constipation and diarrhea.   Endocrine: Positive for cold intolerance (better now). Negative for heat intolerance.   Genitourinary: Negative for menstrual problem.   Musculoskeletal: Negative for neck pain.   Neurological: Negative for tremors.   Psychiatric/Behavioral: The patient is not nervous/anxious.          Objective:      Blood pressure (!) 159/76, pulse 80, height 5' 9" (1.753 m), weight 71.7 kg (158 lb). Body mass index is 23.33 kg/m².  Physical Exam   Constitutional: He appears well-developed.   HENT:   Head: Normocephalic and atraumatic.   Nose: Nose normal.   Mouth/Throat: Oropharynx is clear and moist. No oropharyngeal " exudate.   Eyes: Conjunctivae and EOM are normal.   Neck: Normal range of motion. Neck supple. No JVD present. No tracheal deviation present. No thyromegaly present.   Cardiovascular: Normal rate, regular rhythm and normal heart sounds. Exam reveals no gallop and no friction rub.   No murmur heard.  Pulmonary/Chest: Effort normal and breath sounds normal. No respiratory distress. He has no wheezes. He has no rales.   Abdominal: Soft. Bowel sounds are normal. He exhibits no distension. There is no tenderness.   Neurological: He is alert.   Skin: Skin is warm and dry.   Psychiatric: He has a normal mood and affect.   Nursing note and vitals reviewed.        Assessment:       1. Benign essential hypertension    2. Abnormal kidney function    3. Hypercholesterolemia    4. Acquired hypothyroidism    5. Screen for colon cancer           No visits with results within 3 Month(s) from this visit.   Latest known visit with results is:   Orders Only on 10/25/2018   Component Date Value Ref Range Status    Glucose 10/25/2018 89  70 - 99 mg/dL Final    Sodium 10/25/2018 136  134 - 144 mmol/L Final    Potassium 10/25/2018 4.7  3.5 - 5.0 mmol/L Final    Chloride 10/25/2018 102  98 - 110 mmol/L Final    CO2 10/25/2018 28.7  22.8 - 31.6 mmol/L Final    BUN, Bld 10/25/2018 27* 8 - 20 mg/dL Final    Calcium 10/25/2018 9.3  7.7 - 10.4 mg/dL Final    Creatinine 10/25/2018 1.53* 0.60 - 1.40 mg/dL Final    Albumin 10/25/2018 4.0  3.1 - 4.7 g/dL Final    Phosphorus 10/25/2018 4.3  2.5 - 4.9 mg/dL Final         Plan:           Benign essential hypertension    Abnormal kidney function    Hypercholesterolemia    Acquired hypothyroidism    Screen for colon cancer  -     Cologuard Screening (Multitarget Stool DNA); Future; Expected date: 04/01/2019      Patient has been advised to watch diet and exercise. Avoid fried and fatty food. Compliance to medications and follow up urged.    The patient is asked to make an attempt to improve  diet and exercise patterns to aid in medical management of this problem.    Advised Mr. Reina about age and season appropriate immunizations/ cancer screenings.  Also seasonal influenza vaccine, update on tetanus diphtheria vaccination every 10 years.      Patient's blood pressures are somewhat elevated today. He has not monitored his blood pressures at home    Follow-up in 6 weeks to review his blood pressures and make changes if needed.      Current Outpatient Medications:     levothyroxine (SYNTHROID) 100 MCG tablet, Take 1 tablet (100 mcg total) by mouth before breakfast., Disp: 90 tablet, Rfl: 3    losartan (COZAAR) 50 MG tablet, 1 EVERY EVENING, Disp: 90 tablet, Rfl: 3

## 2019-05-09 ENCOUNTER — TELEPHONE (OUTPATIENT)
Dept: FAMILY MEDICINE | Facility: CLINIC | Age: 74
End: 2019-05-09

## 2019-05-09 DIAGNOSIS — R19.5 POSITIVE COLORECTAL CANCER SCREENING USING COLOGUARD TEST: Primary | ICD-10-CM

## 2019-05-13 ENCOUNTER — OFFICE VISIT (OUTPATIENT)
Dept: FAMILY MEDICINE | Facility: CLINIC | Age: 74
End: 2019-05-13
Payer: COMMERCIAL

## 2019-05-13 VITALS
SYSTOLIC BLOOD PRESSURE: 128 MMHG | WEIGHT: 154 LBS | HEART RATE: 77 BPM | HEIGHT: 69 IN | BODY MASS INDEX: 22.81 KG/M2 | DIASTOLIC BLOOD PRESSURE: 68 MMHG

## 2019-05-13 DIAGNOSIS — I10 BENIGN ESSENTIAL HYPERTENSION: ICD-10-CM

## 2019-05-13 DIAGNOSIS — E03.9 ACQUIRED HYPOTHYROIDISM: ICD-10-CM

## 2019-05-13 DIAGNOSIS — R19.5 POSITIVE COLORECTAL CANCER SCREENING USING COLOGUARD TEST: Primary | ICD-10-CM

## 2019-05-13 PROCEDURE — 3074F PR MOST RECENT SYSTOLIC BLOOD PRESSURE < 130 MM HG: ICD-10-PCS | Mod: ,,, | Performed by: INTERNAL MEDICINE

## 2019-05-13 PROCEDURE — 99213 OFFICE O/P EST LOW 20 MIN: CPT | Mod: ,,, | Performed by: INTERNAL MEDICINE

## 2019-05-13 PROCEDURE — 1101F PR PT FALLS ASSESS DOC 0-1 FALLS W/OUT INJ PAST YR: ICD-10-PCS | Mod: ,,, | Performed by: INTERNAL MEDICINE

## 2019-05-13 PROCEDURE — 1101F PT FALLS ASSESS-DOCD LE1/YR: CPT | Mod: ,,, | Performed by: INTERNAL MEDICINE

## 2019-05-13 PROCEDURE — 3078F DIAST BP <80 MM HG: CPT | Mod: ,,, | Performed by: INTERNAL MEDICINE

## 2019-05-13 PROCEDURE — 3078F PR MOST RECENT DIASTOLIC BLOOD PRESSURE < 80 MM HG: ICD-10-PCS | Mod: ,,, | Performed by: INTERNAL MEDICINE

## 2019-05-13 PROCEDURE — 99213 PR OFFICE/OUTPT VISIT, EST, LEVL III, 20-29 MIN: ICD-10-PCS | Mod: ,,, | Performed by: INTERNAL MEDICINE

## 2019-05-13 PROCEDURE — 3074F SYST BP LT 130 MM HG: CPT | Mod: ,,, | Performed by: INTERNAL MEDICINE

## 2019-05-13 NOTE — PATIENT INSTRUCTIONS
Taking Your Blood Pressure  Blood pressure is the force of blood against the artery wall as it moves from the heart through the blood vessels. You can take your own blood pressure reading using a digital monitor. Take your readings the same each time, using the same arm. Take readings as often as your healthcare provider instructs.  About blood pressure monitors  Blood pressure monitors are designed for certain ages and cases. You can find monitors for older adults, for pregnant women, and for children. Make sure the one you choose is the right one for your age and situation.  The American Heart Association recommends an automatic cuff monitor that fits on your upper arm (bicep). The cuff should fit your arm size. A cuff thats too large or too small will not give an accurate reading. Measure around your upper arm to find your size.  Monitors that attach to your finger or wrist are not as accurate as monitors for your upper arm.  Ask your healthcare provider for help in choosing a monitor. Bring your monitor to your next provider visit if you need help in using it the correct way.  The steps below are general instructions for using an automatic digital monitor.  Step 1. Relax    · Take your blood pressure at the same time every day, such as in the morning or evening, or at the time your healthcare provider recommends.  · Wait at least a half-hour after smoking, eating, or exercising. Don't drink coffee, tea, soda, or other caffeinated beverages before checking your blood pressure.  · Sit comfortably at a table with both feet on the floor. Do not cross your legs or feet. Place the monitor near you.  · Rest for a few minutes before you begin.  Step 2. Wrap the cuff    · Place your arm on the table, palm up. Your arm should be at the level of your heart. Wrap the cuff around your upper arm, just above your elbow. Its best done on bare skin, not over clothing. Most cuffs will indicate where the brachial artery (the  blood vessel in the middle of the arm at the inner side of the elbow) should line up with the cuff. Look in your monitor's instruction booklet for an illustration. You can also bring your cuff to your healthcare provider and have them show you how to correctly place the cuff.  Step 3. Inflate the cuff    · Push the button that starts the pump.  · The cuff will tighten, then loosen.  · The numbers will change. When they stop changing, your blood pressure reading will appear.  · Take 2 or 3 readings one minute apart.  Step 4. Write down the results of each reading    · Write down your blood pressure numbers for each reading. Note the date and time. Keep your results in one place, such as a notebook. Even if your monitor has a built-in memory, keep a hard copy of the readings.  · Remove the cuff from your arm. Turn off the machine.  · Bring your blood pressure records with your healthcare providers at each visit.  · If you start a new blood pressure medicine, note the day you started the new medicine. Also note the day if you change the dose of your medicine. This information goes on your blood pressure recording sheet. This will help your healthcare provider monitor how well the medicine changes are working.  · Ask your healthcare provider what numbers should prompt you to call him or her. Also ask what numbers should prompt you to get help right away.  Date Last Reviewed: 11/1/2016  © 5476-0942 [employer]. [address] [place] [address], [place] [address]. All rights reserved. This information is not intended as a substitute for professional medical care. Always follow your healthcare professional's instructions.

## 2019-05-13 NOTE — PROGRESS NOTES
Subjective:       Patient ID: Jaswinder Reina is a 74 y.o. male.    Chief Complaint: Hypertension and Thyroid Problem    Mr. Pedro Luis Reina is a pleasant 74-year-old  gentleman who comes for follow-up. Underlying issues of hypertension, hypothyroidism have been noted. He is taking his medications as prescribed and his blood pressures are under control. TSH is done last year was within normal range. He has slightly elevated creatinine chronically which seems to be stable at this point. PTH levels are okay.    Recently Cologuard screening test was positive. He has scheduled an appointment for Dr. Chan for colonoscopy. He informs me that he has multiple polyps in past and apparently they give rise to positive test all the time.        Hypertension   This is a chronic problem. The current episode started more than 1 year ago. The problem is uncontrolled. Pertinent negatives include no chest pain, neck pain, palpitations or shortness of breath. Risk factors for coronary artery disease include sedentary lifestyle. Past treatments include angiotensin blockers. Identifiable causes of hypertension include a thyroid problem. There is no history of coarctation of the aorta, hyperaldosteronism, pheochromocytoma or renovascular disease.   Thyroid Problem   Presents for follow-up visit. Symptoms include cold intolerance (better now). Patient reports no anxiety, constipation, diaphoresis, diarrhea, fatigue, heat intolerance, leg swelling, menstrual problem, palpitations, tremors or weight gain. The symptoms have been stable. His past medical history is significant for hyperlipidemia.   Hyperlipidemia   This is a chronic problem. The current episode started more than 1 year ago. The problem is controlled. Exacerbating diseases include obesity. Pertinent negatives include no chest pain or shortness of breath. He is currently on no antihyperlipidemic treatment. The current treatment provides no improvement of lipids. Risk  factors for coronary artery disease include hypertension and dyslipidemia.       Past Medical History:   Diagnosis Date    Hyperlipidemia     Hypertension     Hypothyroidism      Social History     Socioeconomic History    Marital status: Single     Spouse name: Not on file    Number of children: Not on file    Years of education: Not on file    Highest education level: Not on file   Occupational History    Not on file   Social Needs    Financial resource strain: Not on file    Food insecurity:     Worry: Not on file     Inability: Not on file    Transportation needs:     Medical: Not on file     Non-medical: Not on file   Tobacco Use    Smoking status: Former Smoker    Smokeless tobacco: Never Used   Substance and Sexual Activity    Alcohol use: Yes    Drug use: No    Sexual activity: Yes     Partners: Female   Lifestyle    Physical activity:     Days per week: Not on file     Minutes per session: Not on file    Stress: Only a little   Relationships    Social connections:     Talks on phone: Not on file     Gets together: Not on file     Attends Jew service: Not on file     Active member of club or organization: Not on file     Attends meetings of clubs or organizations: Not on file     Relationship status: Not on file   Other Topics Concern    Not on file   Social History Narrative    Not on file     Past Surgical History:   Procedure Laterality Date    TONSILLECTOMY       Family History   Problem Relation Age of Onset    Hypertension Father        Review of Systems   Constitutional: Negative for diaphoresis, fatigue and weight gain.   HENT: Negative for congestion, ear pain, postnasal drip and sore throat.    Eyes: Negative for discharge and itching.   Respiratory: Negative for apnea, chest tightness and shortness of breath.    Cardiovascular: Negative for chest pain and palpitations.   Gastrointestinal: Negative for abdominal distention, abdominal pain, anal bleeding, constipation  "and diarrhea.        Recent Cologuard testing was positive.   Endocrine: Positive for cold intolerance (better now). Negative for heat intolerance.        Hypothyroidism currently stable on levothyroxine.   Genitourinary: Negative for difficulty urinating, dysuria, enuresis, frequency, menstrual problem, penile pain and testicular pain.   Musculoskeletal: Negative for neck pain.   Neurological: Negative for tremors.   Psychiatric/Behavioral: Negative for agitation and behavioral problems. The patient is not nervous/anxious.          Objective:      Blood pressure 128/68, pulse 77, height 5' 9" (1.753 m), weight 69.9 kg (154 lb). Body mass index is 22.74 kg/m².  Physical Exam   Constitutional: He appears well-developed.   HENT:   Head: Normocephalic and atraumatic.   Nose: Nose normal.   Mouth/Throat: Oropharynx is clear and moist. No oropharyngeal exudate.   Eyes: Conjunctivae and EOM are normal.   Neck: Normal range of motion. Neck supple. No JVD present. No tracheal deviation present. No thyromegaly present.   Cardiovascular: Normal rate, regular rhythm and normal heart sounds. Exam reveals no gallop and no friction rub.   No murmur heard.  Pulmonary/Chest: Effort normal and breath sounds normal. No respiratory distress. He has no wheezes. He has no rales.   Abdominal: Soft. Bowel sounds are normal. He exhibits no distension. There is no tenderness.   Musculoskeletal: He exhibits no edema, tenderness or deformity.   Neurological: He is alert.   Skin: Skin is warm and dry.   Psychiatric: He has a normal mood and affect.   Nursing note and vitals reviewed.        Assessment:       1. Positive colorectal cancer screening using Cologuard test    2. Benign essential hypertension    3. Acquired hypothyroidism           No visits with results within 3 Month(s) from this visit.   Latest known visit with results is:   Orders Only on 10/25/2018   Component Date Value Ref Range Status    Glucose 10/25/2018 89  70 - 99 mg/dL " Final    Sodium 10/25/2018 136  134 - 144 mmol/L Final    Potassium 10/25/2018 4.7  3.5 - 5.0 mmol/L Final    Chloride 10/25/2018 102  98 - 110 mmol/L Final    CO2 10/25/2018 28.7  22.8 - 31.6 mmol/L Final    BUN, Bld 10/25/2018 27* 8 - 20 mg/dL Final    Calcium 10/25/2018 9.3  7.7 - 10.4 mg/dL Final    Creatinine 10/25/2018 1.53* 0.60 - 1.40 mg/dL Final    Albumin 10/25/2018 4.0  3.1 - 4.7 g/dL Final    Phosphorus 10/25/2018 4.3  2.5 - 4.9 mg/dL Final         Plan:           Positive colorectal cancer screening using Cologuard test  Comments:  Will See Dr Chan for colonoscopy. H/O multiple polyps in past    Benign essential hypertension  Comments:  Pt BP stable  Orders:  -     Basic metabolic panel; Future; Expected date: 05/13/2019  -     Microalbumin/creatinine urine ratio; Future; Expected date: 05/13/2019    Acquired hypothyroidism  Comments:  TSH ordered  Orders:  -     TSH; Future; Expected date: 05/13/2019      Patient has been advised to watch diet and exercise. Avoid fried and fatty food. Compliance to medications and follow up urged.    The patient is asked to make an attempt to improve diet and exercise patterns to aid in medical management of this problem.    Advised Mr. Reina about age and season appropriate immunizations/ cancer screenings.  Also seasonal influenza vaccine, update on tetanus diphtheria vaccination every 10 years.    Patient will be getting a colonoscopy done by Dr. Chan in view of recent positive Cologuard test. After that he should not get any more Cologuard's. He'll be only on schedule for colonoscopies.     I've given him some information about [facility] to afford generic prescriptions.    Follow-up in 6 months time for annual physical. Labs to be ordered before follow-up.        Current Outpatient Medications:     levothyroxine (SYNTHROID) 100 MCG tablet, Take 1 tablet (100 mcg total) by mouth before breakfast., Disp: 90 tablet, Rfl: 3    losartan (COZAAR) 50  MG tablet, 1 EVERY EVENING, Disp: 90 tablet, Rfl: 3

## 2019-06-10 ENCOUNTER — TELEPHONE (OUTPATIENT)
Dept: FAMILY MEDICINE | Facility: CLINIC | Age: 74
End: 2019-06-10

## 2019-06-10 NOTE — TELEPHONE ENCOUNTER
----- Message from Kameron Akhtar MD sent at 6/9/2019 11:17 PM CDT -----  Regarding: RE: Positive Cologuard.  Check With patient if he saw the gastroenterologist ( Dr Chan)for positive Cologuard.  ----- Message -----  From: Kameron Akhtar MD  Sent: 5/19/2019   3:34 PM  To: Kameron Akhtar MD  Subject: Positive Cologuard.                              Positive Cologuard-patient was supposed to see Dr. Chan.

## 2019-06-30 RX ORDER — LEVOTHYROXINE SODIUM 100 UG/1
TABLET ORAL
Qty: 90 TABLET | Refills: 2 | Status: SHIPPED | OUTPATIENT
Start: 2019-06-30 | End: 2020-03-28

## 2019-09-15 RX ORDER — LOSARTAN POTASSIUM 50 MG/1
TABLET ORAL
Qty: 90 TABLET | Refills: 0 | Status: SHIPPED | OUTPATIENT
Start: 2019-09-15 | End: 2019-12-16 | Stop reason: SDUPTHER

## 2019-11-13 ENCOUNTER — LAB VISIT (OUTPATIENT)
Dept: LAB | Facility: HOSPITAL | Age: 74
End: 2019-11-13
Attending: INTERNAL MEDICINE
Payer: COMMERCIAL

## 2019-11-13 DIAGNOSIS — I51.9 MYXEDEMA HEART DISEASE: Primary | ICD-10-CM

## 2019-11-13 DIAGNOSIS — E03.9 MYXEDEMA HEART DISEASE: Primary | ICD-10-CM

## 2019-11-13 DIAGNOSIS — I10 ESSENTIAL HYPERTENSION, MALIGNANT: ICD-10-CM

## 2019-11-13 LAB
ALBUMIN/CREAT UR: 2.7 UG/MG (ref 0–30)
ANION GAP SERPL CALC-SCNC: 9 MMOL/L (ref 8–16)
BUN SERPL-MCNC: 26 MG/DL (ref 8–23)
CALCIUM SERPL-MCNC: 9.2 MG/DL (ref 8.7–10.5)
CHLORIDE SERPL-SCNC: 102 MMOL/L (ref 95–110)
CO2 SERPL-SCNC: 27 MMOL/L (ref 23–29)
CREAT SERPL-MCNC: 1.6 MG/DL (ref 0.5–1.4)
CREAT UR-MCNC: 122 MG/DL (ref 23–375)
EST. GFR  (AFRICAN AMERICAN): 48.3 ML/MIN/1.73 M^2
EST. GFR  (NON AFRICAN AMERICAN): 41.8 ML/MIN/1.73 M^2
GLUCOSE SERPL-MCNC: 100 MG/DL (ref 70–110)
MICROALBUMIN UR DL<=1MG/L-MCNC: 3.3 UG/ML
POTASSIUM SERPL-SCNC: 3.8 MMOL/L (ref 3.5–5.1)
SODIUM SERPL-SCNC: 138 MMOL/L (ref 136–145)
TSH SERPL DL<=0.005 MIU/L-ACNC: 0.68 UIU/ML (ref 0.34–5.6)

## 2019-11-13 PROCEDURE — 84443 ASSAY THYROID STIM HORMONE: CPT

## 2019-11-13 PROCEDURE — 82043 UR ALBUMIN QUANTITATIVE: CPT

## 2019-11-13 PROCEDURE — 36415 COLL VENOUS BLD VENIPUNCTURE: CPT

## 2019-11-13 PROCEDURE — 80048 BASIC METABOLIC PNL TOTAL CA: CPT

## 2019-11-18 ENCOUNTER — OFFICE VISIT (OUTPATIENT)
Dept: FAMILY MEDICINE | Facility: CLINIC | Age: 74
End: 2019-11-18
Payer: COMMERCIAL

## 2019-11-18 VITALS
DIASTOLIC BLOOD PRESSURE: 77 MMHG | BODY MASS INDEX: 22.66 KG/M2 | WEIGHT: 153 LBS | SYSTOLIC BLOOD PRESSURE: 132 MMHG | HEART RATE: 76 BPM | HEIGHT: 69 IN

## 2019-11-18 DIAGNOSIS — Z00.00 ANNUAL PHYSICAL EXAM: Primary | ICD-10-CM

## 2019-11-18 DIAGNOSIS — Z23 NEED FOR SHINGLES VACCINE: ICD-10-CM

## 2019-11-18 PROCEDURE — 3078F PR MOST RECENT DIASTOLIC BLOOD PRESSURE < 80 MM HG: ICD-10-PCS | Mod: S$GLB,,, | Performed by: INTERNAL MEDICINE

## 2019-11-18 PROCEDURE — 99397 PER PM REEVAL EST PAT 65+ YR: CPT | Mod: S$GLB,,, | Performed by: INTERNAL MEDICINE

## 2019-11-18 PROCEDURE — 3075F SYST BP GE 130 - 139MM HG: CPT | Mod: S$GLB,,, | Performed by: INTERNAL MEDICINE

## 2019-11-18 PROCEDURE — 99397 PR PREVENTIVE VISIT,EST,65 & OVER: ICD-10-PCS | Mod: S$GLB,,, | Performed by: INTERNAL MEDICINE

## 2019-11-18 PROCEDURE — 3078F DIAST BP <80 MM HG: CPT | Mod: S$GLB,,, | Performed by: INTERNAL MEDICINE

## 2019-11-18 PROCEDURE — 3075F PR MOST RECENT SYSTOLIC BLOOD PRESS GE 130-139MM HG: ICD-10-PCS | Mod: S$GLB,,, | Performed by: INTERNAL MEDICINE

## 2019-11-18 NOTE — PROGRESS NOTES
Subjective:       Patient ID: Jaswinder Reina is a 74 y.o. male.    Chief Complaint: Annual Exam (lab review )    Mr. Jaswinder Reina is a pleasant 74-year-old  gentleman who comes for annual physical examination.  Underlying medical issues thus far include hypothyroidism for which he is taking levothyroxine and also mild hypertension for which he is taking losartan.    Otherwise, he is fairly doing well and enjoying his life and he plays golf with his friends.  He has been single all his life and has no children.    His blood pressures are under control and he is taking his levothyroxine as prescribed.    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.  He quit smoking long long ago at might have smoked probably for 10-12 years at approximate rate of 1-2 packs per day.  Alcohol is social and occasional.  No substance abuse.  He drive safely.  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    No significant prostate symptoms .  Appetite is good.  Bowel movements are regular.  Sleep is good at nighttime.  Memory is fairly intact. He denies any problems hearing.  He is waiting for his golf game to come under Par rather than the over par that he is at this point.    He is open to consider relationship if it happens.    As far as preventive care issues are concerned, he is updated on colon cancer screening after a positive Cologuard.  His colonoscopy was eventually negative.  He is updated on both the pneumonia vaccines including Prevnar 13 and pneumococcal 23.  He had received the old shingles vaccine in past and perhaps it is time now to update himself on the new Shingrix vaccine.  He is updated on the tetanus vaccination as well as influenza vaccination.    Driving is safe at this point.      Past Medical History:   Diagnosis Date    History of colonoscopy 6/12/2013    The entire colon was normal. Patient was recommended surveillance colonoscopy in 5 years.    Hyperlipidemia     Hypertension      Hypothyroidism      Social History     Socioeconomic History    Marital status: Single     Spouse name: Not on file    Number of children: Not on file    Years of education: Not on file    Highest education level: Not on file   Occupational History    Not on file   Social Needs    Financial resource strain: Not on file    Food insecurity:     Worry: Not on file     Inability: Not on file    Transportation needs:     Medical: Not on file     Non-medical: Not on file   Tobacco Use    Smoking status: Former Smoker    Smokeless tobacco: Never Used   Substance and Sexual Activity    Alcohol use: Yes    Drug use: No    Sexual activity: Yes     Partners: Female   Lifestyle    Physical activity:     Days per week: Not on file     Minutes per session: Not on file    Stress: Only a little   Relationships    Social connections:     Talks on phone: Not on file     Gets together: Not on file     Attends Mandaeism service: Not on file     Active member of club or organization: Not on file     Attends meetings of clubs or organizations: Not on file     Relationship status: Not on file   Other Topics Concern    Not on file   Social History Narrative    Not on file     Past Surgical History:   Procedure Laterality Date    TONSILLECTOMY       Family History   Problem Relation Age of Onset    Hypertension Father        Review of Systems   Constitutional: Negative for activity change, appetite change, diaphoresis and fatigue.        Hands are cool to touch.   HENT: Negative for congestion, ear pain, postnasal drip and sore throat.    Eyes: Negative for discharge and itching.   Respiratory: Negative for apnea, chest tightness and shortness of breath.    Cardiovascular: Negative for chest pain and palpitations.        HTN   Gastrointestinal: Negative for abdominal distention, abdominal pain, anal bleeding, constipation and diarrhea.        Cologuard testing in past was positive which led to a colonoscopy which was  "unremarkable eventually.   Endocrine: Negative for cold intolerance (better now) and heat intolerance.        Hypothyroidism currently stable on levothyroxine.   Genitourinary: Negative for difficulty urinating, dysuria, enuresis, frequency, penile pain and testicular pain.   Musculoskeletal: Negative for neck pain.   Allergic/Immunologic: Negative for environmental allergies and food allergies.   Neurological: Negative for dizziness, tremors, seizures and light-headedness.   Psychiatric/Behavioral: Negative for agitation and behavioral problems. The patient is not nervous/anxious.          Objective:      Blood pressure 132/77, pulse 76, height 5' 9" (1.753 m), weight 69.4 kg (153 lb). Body mass index is 22.59 kg/m².  Physical Exam   Constitutional: He appears well-developed.   HENT:   Head: Normocephalic and atraumatic.   Nose: Nose normal.   Mouth/Throat: Oropharynx is clear and moist. No oropharyngeal exudate.   Eyes: Conjunctivae and EOM are normal.   Neck: Normal range of motion. Neck supple. No JVD present. No tracheal deviation present. No thyromegaly present.   Cardiovascular: Normal rate, regular rhythm and normal heart sounds. Exam reveals no gallop and no friction rub.   No murmur heard.  Pulmonary/Chest: Effort normal and breath sounds normal. No respiratory distress. He has no wheezes. He has no rales.   Abdominal: Soft. Bowel sounds are normal. He exhibits no distension. There is no tenderness.   Musculoskeletal: He exhibits no edema, tenderness or deformity.   Neurological: He is alert.   Skin: Skin is warm and dry.   Psychiatric: He has a normal mood and affect.   Nursing note and vitals reviewed.        Assessment:       1. Annual physical exam    2. Need for shingles vaccine           Lab Visit on 11/13/2019   Component Date Value Ref Range Status    TSH 11/13/2019 0.680  0.340 - 5.600 uIU/mL Final    Sodium 11/13/2019 138  136 - 145 mmol/L Final    Potassium 11/13/2019 3.8  3.5 - 5.1 mmol/L " Final    Chloride 11/13/2019 102  95 - 110 mmol/L Final    CO2 11/13/2019 27  23 - 29 mmol/L Final    Glucose 11/13/2019 100  70 - 110 mg/dL Final    BUN, Bld 11/13/2019 26* 8 - 23 mg/dL Final    Creatinine 11/13/2019 1.6* 0.5 - 1.4 mg/dL Final    Calcium 11/13/2019 9.2  8.7 - 10.5 mg/dL Final    Anion Gap 11/13/2019 9  8 - 16 mmol/L Final    eGFR if  11/13/2019 48.3* >60 mL/min/1.73 m^2 Final    eGFR if non  11/13/2019 41.8* >60 mL/min/1.73 m^2 Final    Microalbum.,U,Random 11/13/2019 3.3  ug/mL Final    Creatinine, Random Ur 11/13/2019 122.0  23.0 - 375.0 mg/dL Final    Microalb Creat Ratio 11/13/2019 2.7  0.0 - 30.0 ug/mg Final         Plan:           Annual physical exam    Need for shingles vaccine  -     varicella-zoster [id number], PF, (SHINGRIX, PF,) 50 mcg/0.5 mL injection; Inject 0.5 mLs into the muscle once. First dosage now and repeat booster dose between 2-6 months. for 1 dose  Dispense: 0.5 mL; Refill: 1      Advised Mr. Reina about age and season appropriate immunizations/ cancer screenings.  Also seasonal influenza vaccine, update on tetanus diphtheria vaccination every 10 years.      Patient presents with a good physical otherwise.  He is more or less updated on most of the preventive care issues except for need for a new shingles vaccine order.    Social issues have also been reviewed at this point and they seem to be satisfactory.  He is single at this point with no immediate family members but he does have a complement of good friends around him with home he touch bases frequently.    If all goes well, I would like to see him back in 6 months time to carry on his regular medical issues of thyroid or blood pressure.  Earlier if needed.      Current Outpatient Medications:     levothyroxine (SYNTHROID) 100 MCG tablet, TAKE 1 TABLET(100 MCG) BY MOUTH BEFORE BREAKFAST, Disp: 90 tablet, Rfl: 2    losartan (COZAAR) 50 MG tablet, 1 EVERY EVENING, Disp: 90  tablet, Rfl: 0    varicella-zoster [id number], PF, (SHINGRIX, PF,) 50 mcg/0.5 mL injection, Inject 0.5 mLs into the muscle once. First dosage now and repeat booster dose between 2-6 months. for 1 dose, Disp: 0.5 mL, Rfl: 1

## 2019-11-18 NOTE — PATIENT INSTRUCTIONS
Prevention Guidelines, Men Ages 65 and Older  Screening tests and vaccines are an important part of managing your health. Health counseling is essential, too. Below are guidelines for these, for men ages 65 and older. Talk with your healthcare provider to make sure youre up-to-date on what you need.  Screening Who needs it How often   Abdominal aortic aneurysm Men ages 65 to 75 who have ever smoked 1 ultrasound   Alcohol misuse All men in this age group At routine exams   Blood pressure All men in this age group Every 2 years if your blood pressure is less than 120/80 mm Hg; yearly if your systolic blood pressure is 120 to 139 mm Hg, or your diastolic blood pressure reading is 80 to 89 mm Hg   Colorectal cancer All men in this age group Flexible sigmoidoscopy every 5 years, or colonoscopy every 10 years, or double-contrast barium enema every 5 years; yearly fecal occult blood test or fecal immunochemical test; or a stool DNA test as often as your healthcare provider advises; talk with your healthcare provider about which tests are best for you and when you no longer need colonoscopies (generally after age 75)   Depression All men in this age group At routine exams   Type 2 diabetes or prediabetes All adults beginning at age 45 and adults without symptoms at any age who are overweight or obese and have 1 or more other risk factors for diabetes At least every 3 years (yearly if your blood sugar has already begun to rise)   Hepatitis C Men at increased risk for infection - talk with your healthcare provider At routine exams   High cholesterol or triglycerides All men in this age group At least every 5 years   HIV Men at increased risk for infection - talk with your healthcare provider At routine exams   Lung cancer Adults ages 55 to 80 who have smoked Yearly screening in smokers with 30 pack-year history of smoking or who quit within 15 years   Obesity All men in this age group At routine exams   Prostate cancer All  men in this age group, talk to healthcare provider about risks and benefits of digital rectal exam (GINO) and prostate-specific antigen (PSA) screening1 At routine exams   Syphilis Men at increased risk for infection - talk with your healthcare provider At routine exams   Tuberculosis Men at increased risk for infection - talk with your healthcare provider Ask your healthcare provider   Vision All men in this age group Every 1 to 2 years; if you have a chronic health condition, ask your healthcare provider if you needs exams more often   Vaccine Who needs it How often   Chickenpox (varicella) All men in this age group who have no record of this infection or vaccine 2 doses; second dose should be given at least 4 weeks after the first dose   Hepatitis A Men at increased risk for infection - talk with your healthcare provider 2 doses given at least 6 months apart   Hepatitis B Men at increased risk for infection - talk with your healthcare provider 3 doses over 6 months; second dose should be given 1 month after the first dose; the third dose should be given at least 2 months after the second dose and at least 4 months after the first dose   Haemophilus influenzae Type B (HIB) Men at increased risk for infection - talk with your healthcare provider 1 to 3 doses   Influenza (flu) All men in this age group  Once a year   Meningococcal Men at increased risk for infection - talk with your healthcare provider 1 or more doses   Pneumococcal conjugate vaccine (PCV13) and pneumococcal polysaccharide vaccine (PPSV23) All men in this age group 1 dose of each vaccine   Tetanus/diphtheria/  pertussis (Td/Tdap) booster All men in this age group Td every 10 years, or Tdap if you will have contact with a child younger than 12 months old   Zoster All men in this age group 1 dose   Counseling Who needs it How often   Diet and exercise Men who are overweight or obese When diagnosed, and then at routine exams   Fall prevention (exercise,  vitamin D supplements) All men in this age group At routine exams   Sexually transmitted infection Men at increased risk for infection - talk with your healthcare provider At routine exams   Use of daily aspirin Men ages 45 to 79 at risk for cardiovascular health problems At routine exams   Use of tobacco and the health effects it can cause All men in this age group Every visit   [address] [place] [address] [facility]   Date Last Reviewed: 2/1/2017  © 3859-8596 [employer]. [address] [place] [address], [place] [address]. All rights reserved. This information is not intended as a substitute for professional medical care. Always follow your healthcare professional's instructions.

## 2019-12-16 RX ORDER — LOSARTAN POTASSIUM 50 MG/1
TABLET ORAL
Qty: 90 TABLET | Refills: 2 | Status: SHIPPED | OUTPATIENT
Start: 2019-12-16 | End: 2020-09-20

## 2020-03-28 RX ORDER — LEVOTHYROXINE SODIUM 100 UG/1
TABLET ORAL
Qty: 90 TABLET | Refills: 2 | Status: SHIPPED | OUTPATIENT
Start: 2020-03-28 | End: 2020-12-28 | Stop reason: SDUPTHER

## 2020-05-17 NOTE — PROGRESS NOTES
Subjective:       Patient ID: Jaswinder Reina is a 75 y.o. male.    Chief Complaint: Hypertension; Hyperlipidemia; and Thyroid Problem    Mr. Reina is a pleasant 75-year-old  gentleman who comes for follow-up.  Underlying medical issues of hypertension and hypothyroidism have been noted.  At home his blood pressures tend to be fairly good.  He is trying to follow the COVID-19 precautions.  He is taking his levothyroxine also.    In the office the blood pressures have been slightly elevated.  Perhaps I excited him about the COVID-19 pandemic situation.    Hypertension   This is a chronic problem. The current episode started more than 1 year ago. The problem is uncontrolled. Pertinent negatives include no chest pain, neck pain, palpitations or shortness of breath. Risk factors for coronary artery disease include sedentary lifestyle. Past treatments include angiotensin blockers. Identifiable causes of hypertension include a thyroid problem. There is no history of coarctation of the aorta, hyperaldosteronism, pheochromocytoma or renovascular disease.   Thyroid Problem   Presents for follow-up visit. Symptoms include cold intolerance (better now). Patient reports no anxiety, constipation, diaphoresis, diarrhea, fatigue, heat intolerance, leg swelling, menstrual problem, palpitations, tremors or weight gain. The symptoms have been stable. His past medical history is significant for hyperlipidemia.   Hyperlipidemia   This is a chronic problem. The current episode started more than 1 year ago. The problem is controlled. Exacerbating diseases include obesity. Pertinent negatives include no chest pain or shortness of breath. He is currently on no antihyperlipidemic treatment. The current treatment provides no improvement of lipids. Risk factors for coronary artery disease include hypertension and dyslipidemia.       Past Medical History:   Diagnosis Date    History of colonoscopy 6/12/2013    The entire colon was  normal. Patient was recommended surveillance colonoscopy in 5 years.    Hyperlipidemia     Hypertension     Hypothyroidism      Social History     Socioeconomic History    Marital status: Single     Spouse name: Not on file    Number of children: 0    Years of education: Not on file    Highest education level: Not on file   Occupational History    Occupation:  [employer] retd   Social Needs    Financial resource strain: Not on file    Food insecurity:     Worry: Not on file     Inability: Not on file    Transportation needs:     Medical: Not on file     Non-medical: Not on file   Tobacco Use    Smoking status: Former Smoker     Packs/day: 2.00     Types: Cigarettes     Start date: 1962     Last attempt to quit: 1973     Years since quittin.4    Smokeless tobacco: Never Used   Substance and Sexual Activity    Alcohol use: Yes     Alcohol/week: 1.0 - 2.0 standard drinks     Types: 1 - 2 Glasses of wine per week     Comment: occ    Drug use: No    Sexual activity: Not Currently     Partners: Female     Comment: Not sexually active   Lifestyle    Physical activity:     Days per week: Not on file     Minutes per session: Not on file    Stress: Only a little   Relationships    Social connections:     Talks on phone: Not on file     Gets together: Not on file     Attends Spiritism service: Not on file     Active member of club or organization: Not on file     Attends meetings of clubs or organizations: Not on file     Relationship status: Not on file   Other Topics Concern    Not on file   Social History Narrative    Not on file     Past Surgical History:   Procedure Laterality Date    ADENOIDECTOMY      COLONOSCOPY      TONSILLECTOMY       Family History   Problem Relation Age of Onset    Pneumonia Mother     COPD Mother         smoker    Hypertension Father     Suicide Father        Review of Systems   Constitutional: Negative for diaphoresis, fatigue and weight gain.  "  Respiratory: Negative for shortness of breath.    Cardiovascular: Negative for chest pain and palpitations.   Gastrointestinal: Negative for constipation and diarrhea.   Endocrine: Positive for cold intolerance (better now). Negative for heat intolerance.   Genitourinary: Negative for menstrual problem.   Musculoskeletal: Negative for neck pain.   Neurological: Negative for tremors.   Psychiatric/Behavioral: The patient is not nervous/anxious.          Objective:      Blood pressure (!) 146/83, pulse 71, temperature 98.4 °F (36.9 °C), height 5' 9" (1.753 m), weight 69.4 kg (153 lb). Body mass index is 22.59 kg/m².  Physical Exam   Constitutional: He appears well-developed.   HENT:   Head: Normocephalic and atraumatic.   Nose: Nose normal.   Mouth/Throat: Oropharynx is clear and moist. No oropharyngeal exudate.   Eyes: Conjunctivae and EOM are normal.   Neck: Normal range of motion. Neck supple. No JVD present. No tracheal deviation present. No thyromegaly present.   Cardiovascular: Normal rate, regular rhythm and normal heart sounds. Exam reveals no gallop and no friction rub.   No murmur heard.  Pulmonary/Chest: Effort normal and breath sounds normal. No respiratory distress. He has no wheezes. He has no rales.   Abdominal: Soft. Bowel sounds are normal. He exhibits no distension. There is no tenderness.   Neurological: He is alert.   Skin: Skin is warm and dry.   Psychiatric: He has a normal mood and affect.   Nursing note and vitals reviewed.        Assessment:       1. Benign essential hypertension    2. Acquired hypothyroidism    3. Hypercholesterolemia           No visits with results within 3 Month(s) from this visit.   Latest known visit with results is:   Lab Visit on 11/13/2019   Component Date Value Ref Range Status    TSH 11/13/2019 0.680  0.340 - 5.600 uIU/mL Final    Sodium 11/13/2019 138  136 - 145 mmol/L Final    Potassium 11/13/2019 3.8  3.5 - 5.1 mmol/L Final    Chloride 11/13/2019 102  95 - " 110 mmol/L Final    CO2 11/13/2019 27  23 - 29 mmol/L Final    Glucose 11/13/2019 100  70 - 110 mg/dL Final    BUN, Bld 11/13/2019 26* 8 - 23 mg/dL Final    Creatinine 11/13/2019 1.6* 0.5 - 1.4 mg/dL Final    Calcium 11/13/2019 9.2  8.7 - 10.5 mg/dL Final    Anion Gap 11/13/2019 9  8 - 16 mmol/L Final    eGFR if  11/13/2019 48.3* >60 mL/min/1.73 m^2 Final    eGFR if non  11/13/2019 41.8* >60 mL/min/1.73 m^2 Final    Microalbum.,U,Random 11/13/2019 3.3  ug/mL Final    Creatinine, Random Ur 11/13/2019 122.0  23.0 - 375.0 mg/dL Final    Microalb Creat Ratio 11/13/2019 2.7  0.0 - 30.0 ug/mg Final         Plan:           Benign essential hypertension  -     Comprehensive metabolic panel; Future; Expected date: 09/14/2020    Acquired hypothyroidism  -     TSH; Future; Expected date: 09/14/2020    Hypercholesterolemia  -     Lipid Panel; Future; Expected date: 09/14/2020      Patient has been advised to watch diet and exercise. Avoid fried and fatty food. Compliance to medications and follow up urged.    The patient is asked to make an attempt to improve diet and exercise patterns to aid in medical management of this problem.    Advised Mr. Reina about age and season appropriate immunizations/ cancer screenings.  Also seasonal influenza vaccine, update on tetanus diphtheria vaccination every 10 years.      Patient's blood pressures are somewhat elevated today. He has not monitored his blood pressures at home    Follow-up in 4-6 months to review his blood pressures and make changes if needed.      Current Outpatient Medications:     levothyroxine (SYNTHROID) 100 MCG tablet, TAKE 1 TABLET(100 MCG) BY MOUTH BEFORE BREAKFAST, Disp: 90 tablet, Rfl: 2    losartan (COZAAR) 50 MG tablet, 1 EVERY EVENING, Disp: 90 tablet, Rfl: 2

## 2020-05-18 ENCOUNTER — OFFICE VISIT (OUTPATIENT)
Dept: FAMILY MEDICINE | Facility: CLINIC | Age: 75
End: 2020-05-18
Payer: COMMERCIAL

## 2020-05-18 VITALS
TEMPERATURE: 98 F | DIASTOLIC BLOOD PRESSURE: 83 MMHG | HEIGHT: 69 IN | BODY MASS INDEX: 22.66 KG/M2 | HEART RATE: 71 BPM | SYSTOLIC BLOOD PRESSURE: 146 MMHG | WEIGHT: 153 LBS

## 2020-05-18 DIAGNOSIS — E78.00 HYPERCHOLESTEROLEMIA: ICD-10-CM

## 2020-05-18 DIAGNOSIS — E03.9 ACQUIRED HYPOTHYROIDISM: ICD-10-CM

## 2020-05-18 DIAGNOSIS — I10 BENIGN ESSENTIAL HYPERTENSION: Primary | ICD-10-CM

## 2020-05-18 PROCEDURE — 99213 OFFICE O/P EST LOW 20 MIN: CPT | Mod: S$GLB,,, | Performed by: INTERNAL MEDICINE

## 2020-05-18 PROCEDURE — 1159F PR MEDICATION LIST DOCUMENTED IN MEDICAL RECORD: ICD-10-PCS | Mod: S$GLB,,, | Performed by: INTERNAL MEDICINE

## 2020-05-18 PROCEDURE — 1101F PT FALLS ASSESS-DOCD LE1/YR: CPT | Mod: S$GLB,,, | Performed by: INTERNAL MEDICINE

## 2020-05-18 PROCEDURE — 3077F SYST BP >= 140 MM HG: CPT | Mod: S$GLB,,, | Performed by: INTERNAL MEDICINE

## 2020-05-18 PROCEDURE — 3079F PR MOST RECENT DIASTOLIC BLOOD PRESSURE 80-89 MM HG: ICD-10-PCS | Mod: S$GLB,,, | Performed by: INTERNAL MEDICINE

## 2020-05-18 PROCEDURE — 3077F PR MOST RECENT SYSTOLIC BLOOD PRESSURE >= 140 MM HG: ICD-10-PCS | Mod: S$GLB,,, | Performed by: INTERNAL MEDICINE

## 2020-05-18 PROCEDURE — 99213 PR OFFICE/OUTPT VISIT, EST, LEVL III, 20-29 MIN: ICD-10-PCS | Mod: S$GLB,,, | Performed by: INTERNAL MEDICINE

## 2020-05-18 PROCEDURE — 1126F PR PAIN SEVERITY QUANTIFIED, NO PAIN PRESENT: ICD-10-PCS | Mod: S$GLB,,, | Performed by: INTERNAL MEDICINE

## 2020-05-18 PROCEDURE — 1126F AMNT PAIN NOTED NONE PRSNT: CPT | Mod: S$GLB,,, | Performed by: INTERNAL MEDICINE

## 2020-05-18 PROCEDURE — 1101F PR PT FALLS ASSESS DOC 0-1 FALLS W/OUT INJ PAST YR: ICD-10-PCS | Mod: S$GLB,,, | Performed by: INTERNAL MEDICINE

## 2020-05-18 PROCEDURE — 3079F DIAST BP 80-89 MM HG: CPT | Mod: S$GLB,,, | Performed by: INTERNAL MEDICINE

## 2020-05-18 PROCEDURE — 1159F MED LIST DOCD IN RCRD: CPT | Mod: S$GLB,,, | Performed by: INTERNAL MEDICINE

## 2020-05-18 NOTE — PATIENT INSTRUCTIONS
Taking Your Blood Pressure  Blood pressure is the force of blood against the artery wall as it moves from the heart through the blood vessels. You can take your own blood pressure reading using a digital monitor. Take your readings the same each time, using the same arm. Take readings as often as your healthcare provider instructs.  About blood pressure monitors  Blood pressure monitors are designed for certain ages and cases. You can find monitors for older adults, for pregnant women, and for children. Make sure the one you choose is the right one for your age and situation.  The American Heart Association recommends an automatic cuff monitor that fits on your upper arm (bicep). The cuff should fit your arm size. A cuff thats too large or too small will not give an accurate reading. Measure around your upper arm to find your size.  Monitors that attach to your finger or wrist are not as accurate as monitors for your upper arm.  Ask your healthcare provider for help in choosing a monitor. Bring your monitor to your next provider visit if you need help in using it the correct way.  The steps below are general instructions for using an automatic digital monitor.  Step 1. Relax    · Take your blood pressure at the same time every day, such as in the morning or evening, or at the time your healthcare provider recommends.  · Wait at least a half-hour after smoking, eating, or exercising. Don't drink coffee, tea, soda, or other caffeinated beverages before checking your blood pressure.  · Sit comfortably at a table with both feet on the floor. Do not cross your legs or feet. Place the monitor near you.  · Rest for a few minutes before you begin.  Step 2. Wrap the cuff    · Place your arm on the table, palm up. Your arm should be at the level of your heart. Wrap the cuff around your upper arm, just above your elbow. Its best done on bare skin, not over clothing. Most cuffs will indicate where the brachial artery (the  blood vessel in the middle of the arm at the inner side of the elbow) should line up with the cuff. Look in your monitor's instruction booklet for an illustration. You can also bring your cuff to your healthcare provider and have them show you how to correctly place the cuff.  Step 3. Inflate the cuff    · Push the button that starts the pump.  · The cuff will tighten, then loosen.  · The numbers will change. When they stop changing, your blood pressure reading will appear.  · Take 2 or 3 readings one minute apart.  Step 4. Write down the results of each reading    · Write down your blood pressure numbers for each reading. Note the date and time. Keep your results in one place, such as a notebook. Even if your monitor has a built-in memory, keep a hard copy of the readings.  · Remove the cuff from your arm. Turn off the machine.  · Bring your blood pressure records with your healthcare providers at each visit.  · If you start a new blood pressure medicine, note the day you started the new medicine. Also note the day if you change the dose of your medicine. This information goes on your blood pressure recording sheet. This will help your healthcare provider monitor how well the medicine changes are working.  · Ask your healthcare provider what numbers should prompt you to call him or her. Also ask what numbers should prompt you to get help right away.  Date Last Reviewed: 11/1/2016  © 5557-7890 [employer]. [address] [place] [address], [place] [address]. All rights reserved. This information is not intended as a substitute for professional medical care. Always follow your healthcare professional's instructions.

## 2020-08-25 ENCOUNTER — OFFICE VISIT (OUTPATIENT)
Dept: FAMILY MEDICINE | Facility: CLINIC | Age: 75
End: 2020-08-25
Payer: COMMERCIAL

## 2020-08-25 VITALS
DIASTOLIC BLOOD PRESSURE: 77 MMHG | HEART RATE: 77 BPM | TEMPERATURE: 97 F | HEIGHT: 69 IN | WEIGHT: 153 LBS | BODY MASS INDEX: 22.66 KG/M2 | SYSTOLIC BLOOD PRESSURE: 160 MMHG

## 2020-08-25 DIAGNOSIS — K14.6 TONGUE BURNING SENSATION: Primary | ICD-10-CM

## 2020-08-25 DIAGNOSIS — D64.89 ANEMIA DUE TO OTHER CAUSE, NOT CLASSIFIED: ICD-10-CM

## 2020-08-25 DIAGNOSIS — Z23 INFLUENZA VACCINATION GIVEN: ICD-10-CM

## 2020-08-25 DIAGNOSIS — E53.8 B12 DEFICIENCY: ICD-10-CM

## 2020-08-25 PROCEDURE — 1101F PT FALLS ASSESS-DOCD LE1/YR: CPT | Mod: S$GLB,,, | Performed by: INTERNAL MEDICINE

## 2020-08-25 PROCEDURE — 99213 PR OFFICE/OUTPT VISIT, EST, LEVL III, 20-29 MIN: ICD-10-PCS | Mod: 25,S$GLB,, | Performed by: INTERNAL MEDICINE

## 2020-08-25 PROCEDURE — 1101F PR PT FALLS ASSESS DOC 0-1 FALLS W/OUT INJ PAST YR: ICD-10-PCS | Mod: S$GLB,,, | Performed by: INTERNAL MEDICINE

## 2020-08-25 PROCEDURE — 1159F PR MEDICATION LIST DOCUMENTED IN MEDICAL RECORD: ICD-10-PCS | Mod: S$GLB,,, | Performed by: INTERNAL MEDICINE

## 2020-08-25 PROCEDURE — 3078F DIAST BP <80 MM HG: CPT | Mod: S$GLB,,, | Performed by: INTERNAL MEDICINE

## 2020-08-25 PROCEDURE — 90662 FLU VACCINE - QUADRIVALENT - HIGH DOSE (65+) PRESERVATIVE FREE IM: ICD-10-PCS | Mod: S$GLB,,, | Performed by: INTERNAL MEDICINE

## 2020-08-25 PROCEDURE — 3077F SYST BP >= 140 MM HG: CPT | Mod: S$GLB,,, | Performed by: INTERNAL MEDICINE

## 2020-08-25 PROCEDURE — 3077F PR MOST RECENT SYSTOLIC BLOOD PRESSURE >= 140 MM HG: ICD-10-PCS | Mod: S$GLB,,, | Performed by: INTERNAL MEDICINE

## 2020-08-25 PROCEDURE — 99213 OFFICE O/P EST LOW 20 MIN: CPT | Mod: 25,S$GLB,, | Performed by: INTERNAL MEDICINE

## 2020-08-25 PROCEDURE — 90471 FLU VACCINE - QUADRIVALENT - HIGH DOSE (65+) PRESERVATIVE FREE IM: ICD-10-PCS | Mod: S$GLB,,, | Performed by: INTERNAL MEDICINE

## 2020-08-25 PROCEDURE — 3078F PR MOST RECENT DIASTOLIC BLOOD PRESSURE < 80 MM HG: ICD-10-PCS | Mod: S$GLB,,, | Performed by: INTERNAL MEDICINE

## 2020-08-25 PROCEDURE — 90662 IIV NO PRSV INCREASED AG IM: CPT | Mod: S$GLB,,, | Performed by: INTERNAL MEDICINE

## 2020-08-25 PROCEDURE — 1126F AMNT PAIN NOTED NONE PRSNT: CPT | Mod: S$GLB,,, | Performed by: INTERNAL MEDICINE

## 2020-08-25 PROCEDURE — 1159F MED LIST DOCD IN RCRD: CPT | Mod: S$GLB,,, | Performed by: INTERNAL MEDICINE

## 2020-08-25 PROCEDURE — 1126F PR PAIN SEVERITY QUANTIFIED, NO PAIN PRESENT: ICD-10-PCS | Mod: S$GLB,,, | Performed by: INTERNAL MEDICINE

## 2020-08-25 PROCEDURE — 90471 IMMUNIZATION ADMIN: CPT | Mod: S$GLB,,, | Performed by: INTERNAL MEDICINE

## 2020-08-25 NOTE — PATIENT INSTRUCTIONS
High Blood Pressure, To Be Confirmed, No Treatment  Your blood pressure today was higher than normal. Sometimes anxiety or pain can cause a temporary rise in blood pressure. It later returns to normal. Blood pressure that is high only one time doesnt mean that you have high blood pressure (hypertension). High blood pressure is a chronic illness. But you should have your blood pressure measured again within the next few days to find out if its still high.    A blood pressure reading is made up of two numbers: a higher number over a lower number. The top number is the systolic pressure. The bottom number is the diastolic pressure. A normal blood pressure is a systolic pressure of less than 120 over a diastolic pressure of less than 80. You will see your blood pressure readings written together. For example, a person with a systolic pressure of 118 and a diastolic pressure of 78 will have 118/78 written in the medical record.     High blood pressure is when either the top number is 140 or higher, or the bottom number is 90 or higher. This must be the result when taking your blood pressure a number of times.   The blood pressures between normal and high are called prehypertension. This is systolic pressure of 120 to 140 or diastolic pressure of 80 to 89. Prehypertension means you are at risk of getting high blood pressure. It's a warning sign. The information gives you a chance to  make lifestyle changes such as weight loss, exercise, and quitting smoking, that can keep your blood pressure from going higher. You should have your blood pressure checked regularly to be sure it isnt rising.  Home care  To track your blood pressure, your provider may ask you to come into the office at different times and on different days. If your healthcare provider asks you to check your readings at home, ask him or her what times of the day to test and for how many days. Before you leave the office, ask your provider to show you how  to take your blood pressure and be sure to ask questions if you don't understand something.  Consider buying an automatic blood pressure monitor. Ask your provider for a recommendation. You can buy blood pressure monitors at most pharmacies.  The American Heart Association recommends the following guidelines for home blood pressure monitoring:  · Don't smoke or drink coffee for 30 minutes before taking your blood pressure.  · Go to the bathroom before the test.  · Relax for 5 minutes before taking the measurement.  · Sit with your back supported (don't sit on a couch or soft chair); keep your feet on the floor uncrossed. Place your arm on a solid flat surface (like a table) with the upper part of the arm at heart level. Place the middle of the cuff directly above the eye of the elbow. Check the monitor's instruction manual for an illustration.  · Take multiple readings. When you measure, take 2 to 3 readings one minute apart and record all of the results.  · Take your blood pressure at the same time every day, or as your healthcare provider recommends.  · Record the date, time, and blood pressure reading.  · Take the record with you to your next medical appointment. If your blood pressure monitor has a built-in memory, simply take the monitor with you to your next appointment.  · Call your provider if you have several high readings. Don't be frightened by a single high blood pressure reading, but if you get several high readings, check in with your healthcare provider.  · Note: When blood pressure reaches a systolic (top number) of 180 or higher OR diastolic (bottom number) of 110 or higher, seek emergency medical treatment.  Follow-up care  Keep all of your follow up appointments. If your blood pressure is high (more than 120 over 80) on 2 out of 3 days, you will need to follow up with your healthcare provider for more evaluation and treatment.  Dont put this off! High blood pressure can be treated. High blood  pressure thats not treated raises your risk for heart attack and stroke.  When to seek medical advice  Call your healthcare provider right away if any of these occur:  · Blood pressure reaches a systolic (top number) of 180 or higher, OR diastolic (bottom number) of 110 or higher  · Chest pain or shortness of breath  · Severe headache  · Throbbing or rushing sound in the ears  · Nosebleed  · Sudden severe pain in your belly (abdomen)  · Extreme drowsiness, confusion, or fainting  · Dizziness or dizziness with spinning sensation (vertigo)  · Weakness of an arm or leg or one side of the face  · You have problems speaking or seeing   Date Last Reviewed: 12/1/2016  © 8260-7965 [employer]. [address] [place] [address], [place] [address]. All rights reserved. This information is not intended as a substitute for professional medical care. Always follow your healthcare professional's instructions.

## 2020-08-25 NOTE — PROGRESS NOTES
Subjective:       Patient ID: Jaswinder Reina is a 75 y.o. male.    Chief Complaint: Numbness (tongue and lips)    Mr. Pedro Luis Reina is a 75-year-old pleasant  gentleman who comes for follow-up.    He complains of feeling some numbness in the tongue and in the lips for the last 3 or 4 days.  He does not recall any time bite or eating anything hot or excessively cold.  He did not have a dental procedure.    His sense of smell and taste is good.  No fevers.  No numbness in the fingers or legs.  No contact with COVID-19.    Underlying medical issues of hypertension and hypothyroidism are noted for which he is taking medications as prescribed.    His last thyroid test was within normal range.  His general chemistry test have shown a somewhat elevated creatinine levels in past.  This has been kept under observation without any sign for the significant deterioration.    In past he was also diagnosed to have Schley's anemia.    Has not had a blood count recently and this will be added.      Past Medical History:   Diagnosis Date    History of colonoscopy 2013    The entire colon was normal. Patient was recommended surveillance colonoscopy in 5 years.    Hyperlipidemia     Hypertension     Hypothyroidism      Social History     Socioeconomic History    Marital status: Single     Spouse name: Not on file    Number of children: 0    Years of education: Not on file    Highest education level: Not on file   Occupational History    Occupation:  [employer] retd   Social Needs    Financial resource strain: Not on file    Food insecurity     Worry: Not on file     Inability: Not on file    Transportation needs     Medical: Not on file     Non-medical: Not on file   Tobacco Use    Smoking status: Former Smoker     Packs/day: 2.00     Types: Cigarettes     Start date: 1962     Quit date: 1973     Years since quittin.6    Smokeless tobacco: Never Used   Substance and Sexual Activity     Alcohol use: Yes     Alcohol/week: 1.0 - 2.0 standard drinks     Types: 1 - 2 Glasses of wine per week     Comment: occ    Drug use: No    Sexual activity: Not Currently     Partners: Female     Comment: Not sexually active   Lifestyle    Physical activity     Days per week: Not on file     Minutes per session: Not on file    Stress: Only a little   Relationships    Social connections     Talks on phone: Not on file     Gets together: Not on file     Attends Islam service: Not on file     Active member of club or organization: Not on file     Attends meetings of clubs or organizations: Not on file     Relationship status: Not on file   Other Topics Concern    Not on file   Social History Narrative    Not on file     Past Surgical History:   Procedure Laterality Date    ADENOIDECTOMY      COLONOSCOPY      TONSILLECTOMY       Family History   Problem Relation Age of Onset    Pneumonia Mother     COPD Mother         smoker    Hypertension Father     Suicide Father        Review of Systems   Constitutional: Negative for activity change, chills, diaphoresis, fatigue and fever.   HENT: Negative for congestion, dental problem, ear discharge, hearing loss and sinus pain.         Burning sensation in tongue and lips.  This is for the last 3 or 4 days.   Eyes: Negative for photophobia, pain and visual disturbance.   Respiratory: Negative for apnea, choking and shortness of breath.    Cardiovascular: Negative for chest pain, palpitations and leg swelling.   Gastrointestinal: Negative for abdominal distention, abdominal pain, constipation and diarrhea.   Endocrine: Positive for cold intolerance (better now). Negative for heat intolerance.   Musculoskeletal: Negative for neck pain.   Neurological: Negative for tremors.   Hematological:        History of addisonian anemia.   Psychiatric/Behavioral: The patient is not nervous/anxious.          Objective:      Blood pressure (!) 160/77, pulse 77, temperature  "97 °F (36.1 °C), height 5' 9" (1.753 m), weight 69.4 kg (153 lb). Body mass index is 22.59 kg/m².  Physical Exam  Vitals signs and nursing note reviewed.   Constitutional:       Appearance: He is well-developed.   HENT:      Head: Normocephalic and atraumatic.      Nose: Nose normal.      Mouth/Throat:      Mouth: No oral lesions or angioedema.      Dentition: Normal dentition. No dental tenderness, dental caries or gum lesions.      Tongue: No lesions. Tongue does not deviate from midline.      Palate: No mass and lesions.      Pharynx: No pharyngeal swelling, oropharyngeal exudate, posterior oropharyngeal erythema or uvula swelling.      Comments: Examination of the mouth shows moist tongue.  I do not see any evidence of lesions like leukoplakia or redness.  Examination the gumline and dentition also do not show anything significant.  Eyes:      Conjunctiva/sclera: Conjunctivae normal.   Neck:      Musculoskeletal: Normal range of motion and neck supple.      Thyroid: No thyromegaly.      Vascular: No JVD.      Trachea: No tracheal deviation.   Cardiovascular:      Rate and Rhythm: Normal rate and regular rhythm.      Heart sounds: Normal heart sounds. No murmur. No friction rub. No gallop.    Pulmonary:      Effort: Pulmonary effort is normal. No respiratory distress.      Breath sounds: Normal breath sounds.   Abdominal:      General: There is no distension.      Palpations: Abdomen is soft.      Tenderness: There is no abdominal tenderness.   Skin:     General: Skin is warm and dry.   Neurological:      Mental Status: He is alert.           Assessment:       1. Tongue burning sensation    2. B12 deficiency    3. Anemia due to other cause, not classified    4. Influenza vaccination given           No visits with results within 3 Month(s) from this visit.   Latest known visit with results is:   Lab Visit on 11/13/2019   Component Date Value Ref Range Status    TSH 11/13/2019 0.680  0.340 - 5.600 uIU/mL Final    " Sodium 11/13/2019 138  136 - 145 mmol/L Final    Potassium 11/13/2019 3.8  3.5 - 5.1 mmol/L Final    Chloride 11/13/2019 102  95 - 110 mmol/L Final    CO2 11/13/2019 27  23 - 29 mmol/L Final    Glucose 11/13/2019 100  70 - 110 mg/dL Final    BUN, Bld 11/13/2019 26* 8 - 23 mg/dL Final    Creatinine 11/13/2019 1.6* 0.5 - 1.4 mg/dL Final    Calcium 11/13/2019 9.2  8.7 - 10.5 mg/dL Final    Anion Gap 11/13/2019 9  8 - 16 mmol/L Final    eGFR if  11/13/2019 48.3* >60 mL/min/1.73 m^2 Final    eGFR if non  11/13/2019 41.8* >60 mL/min/1.73 m^2 Final    Microalbum.,U,Random 11/13/2019 3.3  ug/mL Final    Creatinine, Random Ur 11/13/2019 122.0  23.0 - 375.0 mg/dL Final    Microalb Creat Ratio 11/13/2019 2.7  0.0 - 30.0 ug/mg Final         Plan:           Tongue burning sensation  Comments:  Cause of burning tongue is unknown at this point.  Possibility of some vitamin insufficiency.?  History of pernicious anemia.  Recommended vitamins    B12 deficiency  -     Vitamin B12; Future; Expected date: 08/25/2020    Anemia due to other cause, not classified  -     CBC auto differential; Future; Expected date: 08/25/2020    Influenza vaccination given  -     Influenza - Quadrivalent - High Dose (65+) (PF) (IM)      Cause of patient's burning sensation in the tongue and lips is unclear at this point.  I would check some blood counts and vitamin B12 level and see if everything is okay.    His blood pressures are also elevated and he will monitor it at home.    He will get his flu shot today.    I will advised him to take some multivitamins with B complex and see how he does in the next several days or so.    He will keep his follow-up as a appointment.  October 19, 2020.      Current Outpatient Medications:     levothyroxine (SYNTHROID) 100 MCG tablet, TAKE 1 TABLET(100 MCG) BY MOUTH BEFORE BREAKFAST, Disp: 90 tablet, Rfl: 2    losartan (COZAAR) 50 MG tablet, 1 EVERY EVENING, Disp: 90  tablet, Rfl: 2

## 2020-08-26 ENCOUNTER — LAB VISIT (OUTPATIENT)
Dept: LAB | Facility: HOSPITAL | Age: 75
End: 2020-08-26
Attending: INTERNAL MEDICINE
Payer: COMMERCIAL

## 2020-08-26 DIAGNOSIS — D64.89 ANEMIA DUE TO OTHER CAUSE, NOT CLASSIFIED: ICD-10-CM

## 2020-08-26 DIAGNOSIS — E53.8 B12 DEFICIENCY: ICD-10-CM

## 2020-08-26 LAB
BASOPHILS # BLD AUTO: 0.07 K/UL (ref 0–0.2)
BASOPHILS NFR BLD: 0.9 % (ref 0–1.9)
DIFFERENTIAL METHOD: ABNORMAL
EOSINOPHIL # BLD AUTO: 0.2 K/UL (ref 0–0.5)
EOSINOPHIL NFR BLD: 2.3 % (ref 0–8)
ERYTHROCYTE [DISTWIDTH] IN BLOOD BY AUTOMATED COUNT: 12.7 % (ref 11.5–14.5)
HCT VFR BLD AUTO: 38.6 % (ref 40–54)
HGB BLD-MCNC: 12.6 G/DL (ref 14–18)
IMM GRANULOCYTES # BLD AUTO: 0.02 K/UL (ref 0–0.04)
IMM GRANULOCYTES NFR BLD AUTO: 0.3 % (ref 0–0.5)
LYMPHOCYTES # BLD AUTO: 1.3 K/UL (ref 1–4.8)
LYMPHOCYTES NFR BLD: 17.4 % (ref 18–48)
MCH RBC QN AUTO: 31 PG (ref 27–31)
MCHC RBC AUTO-ENTMCNC: 32.6 G/DL (ref 32–36)
MCV RBC AUTO: 95 FL (ref 82–98)
MONOCYTES # BLD AUTO: 1 K/UL (ref 0.3–1)
MONOCYTES NFR BLD: 13.2 % (ref 4–15)
NEUTROPHILS # BLD AUTO: 5.1 K/UL (ref 1.8–7.7)
NEUTROPHILS NFR BLD: 65.9 % (ref 38–73)
NRBC BLD-RTO: 0 /100 WBC
PLATELET # BLD AUTO: 307 K/UL (ref 150–350)
PMV BLD AUTO: 10.1 FL (ref 9.2–12.9)
RBC # BLD AUTO: 4.06 M/UL (ref 4.6–6.2)
VIT B12 SERPL-MCNC: 313 PG/ML (ref 210–950)
WBC # BLD AUTO: 7.71 K/UL (ref 3.9–12.7)

## 2020-08-26 PROCEDURE — 82607 VITAMIN B-12: CPT

## 2020-08-26 PROCEDURE — 85025 COMPLETE CBC W/AUTO DIFF WBC: CPT

## 2020-08-26 PROCEDURE — 36415 COLL VENOUS BLD VENIPUNCTURE: CPT

## 2020-10-14 ENCOUNTER — LAB VISIT (OUTPATIENT)
Dept: LAB | Facility: HOSPITAL | Age: 75
End: 2020-10-14
Attending: INTERNAL MEDICINE
Payer: COMMERCIAL

## 2020-10-14 DIAGNOSIS — E03.9 ACQUIRED HYPOTHYROIDISM: ICD-10-CM

## 2020-10-14 DIAGNOSIS — I10 BENIGN ESSENTIAL HYPERTENSION: ICD-10-CM

## 2020-10-14 DIAGNOSIS — E78.00 HYPERCHOLESTEROLEMIA: ICD-10-CM

## 2020-10-14 LAB
ALBUMIN SERPL BCP-MCNC: 4.2 G/DL (ref 3.5–5.2)
ALP SERPL-CCNC: 64 U/L (ref 55–135)
ALT SERPL W/O P-5'-P-CCNC: 21 U/L (ref 10–44)
ANION GAP SERPL CALC-SCNC: 12 MMOL/L (ref 8–16)
AST SERPL-CCNC: 27 U/L (ref 10–40)
BILIRUB SERPL-MCNC: 1.2 MG/DL (ref 0.1–1)
BUN SERPL-MCNC: 28 MG/DL (ref 8–23)
CALCIUM SERPL-MCNC: 9.3 MG/DL (ref 8.7–10.5)
CHLORIDE SERPL-SCNC: 103 MMOL/L (ref 95–110)
CHOLEST SERPL-MCNC: 223 MG/DL (ref 120–199)
CHOLEST/HDLC SERPL: 4.5 {RATIO} (ref 2–5)
CO2 SERPL-SCNC: 24 MMOL/L (ref 23–29)
CREAT SERPL-MCNC: 1.8 MG/DL (ref 0.5–1.4)
EST. GFR  (AFRICAN AMERICAN): 41.6 ML/MIN/1.73 M^2
EST. GFR  (NON AFRICAN AMERICAN): 36 ML/MIN/1.73 M^2
GLUCOSE SERPL-MCNC: 93 MG/DL (ref 70–110)
HDLC SERPL-MCNC: 50 MG/DL (ref 40–75)
HDLC SERPL: 22.4 % (ref 20–50)
LDLC SERPL CALC-MCNC: 151.6 MG/DL (ref 63–159)
NONHDLC SERPL-MCNC: 173 MG/DL
POTASSIUM SERPL-SCNC: 4.2 MMOL/L (ref 3.5–5.1)
PROT SERPL-MCNC: 7.5 G/DL (ref 6–8.4)
SODIUM SERPL-SCNC: 139 MMOL/L (ref 136–145)
TRIGL SERPL-MCNC: 107 MG/DL (ref 30–150)
TSH SERPL DL<=0.005 MIU/L-ACNC: 0.46 UIU/ML (ref 0.34–5.6)

## 2020-10-14 PROCEDURE — 36415 COLL VENOUS BLD VENIPUNCTURE: CPT

## 2020-10-14 PROCEDURE — 80053 COMPREHEN METABOLIC PANEL: CPT

## 2020-10-14 PROCEDURE — 80061 LIPID PANEL: CPT

## 2020-10-14 PROCEDURE — 84443 ASSAY THYROID STIM HORMONE: CPT

## 2020-10-19 ENCOUNTER — OFFICE VISIT (OUTPATIENT)
Dept: FAMILY MEDICINE | Facility: CLINIC | Age: 75
End: 2020-10-19
Payer: COMMERCIAL

## 2020-10-19 ENCOUNTER — TELEPHONE (OUTPATIENT)
Dept: FAMILY MEDICINE | Facility: CLINIC | Age: 75
End: 2020-10-19

## 2020-10-19 VITALS
HEART RATE: 71 BPM | DIASTOLIC BLOOD PRESSURE: 76 MMHG | HEIGHT: 69 IN | SYSTOLIC BLOOD PRESSURE: 128 MMHG | BODY MASS INDEX: 22.22 KG/M2 | TEMPERATURE: 97 F | WEIGHT: 150 LBS

## 2020-10-19 DIAGNOSIS — I10 BENIGN ESSENTIAL HYPERTENSION: Primary | ICD-10-CM

## 2020-10-19 DIAGNOSIS — I44.7 LEFT BUNDLE BRANCH BLOCK: Chronic | ICD-10-CM

## 2020-10-19 DIAGNOSIS — N18.30 STAGE 3 CHRONIC KIDNEY DISEASE, UNSPECIFIED WHETHER STAGE 3A OR 3B CKD: ICD-10-CM

## 2020-10-19 DIAGNOSIS — E78.00 HYPERCHOLESTEROLEMIA: ICD-10-CM

## 2020-10-19 DIAGNOSIS — E03.9 ACQUIRED HYPOTHYROIDISM: ICD-10-CM

## 2020-10-19 PROCEDURE — 1126F AMNT PAIN NOTED NONE PRSNT: CPT | Mod: S$GLB,,, | Performed by: INTERNAL MEDICINE

## 2020-10-19 PROCEDURE — 1101F PR PT FALLS ASSESS DOC 0-1 FALLS W/OUT INJ PAST YR: ICD-10-PCS | Mod: S$GLB,,, | Performed by: INTERNAL MEDICINE

## 2020-10-19 PROCEDURE — 3074F PR MOST RECENT SYSTOLIC BLOOD PRESSURE < 130 MM HG: ICD-10-PCS | Mod: S$GLB,,, | Performed by: INTERNAL MEDICINE

## 2020-10-19 PROCEDURE — 3078F PR MOST RECENT DIASTOLIC BLOOD PRESSURE < 80 MM HG: ICD-10-PCS | Mod: S$GLB,,, | Performed by: INTERNAL MEDICINE

## 2020-10-19 PROCEDURE — 1159F MED LIST DOCD IN RCRD: CPT | Mod: S$GLB,,, | Performed by: INTERNAL MEDICINE

## 2020-10-19 PROCEDURE — 99214 OFFICE O/P EST MOD 30 MIN: CPT | Mod: S$GLB,,, | Performed by: INTERNAL MEDICINE

## 2020-10-19 PROCEDURE — 99214 PR OFFICE/OUTPT VISIT, EST, LEVL IV, 30-39 MIN: ICD-10-PCS | Mod: S$GLB,,, | Performed by: INTERNAL MEDICINE

## 2020-10-19 PROCEDURE — 3078F DIAST BP <80 MM HG: CPT | Mod: S$GLB,,, | Performed by: INTERNAL MEDICINE

## 2020-10-19 PROCEDURE — 3074F SYST BP LT 130 MM HG: CPT | Mod: S$GLB,,, | Performed by: INTERNAL MEDICINE

## 2020-10-19 PROCEDURE — 1159F PR MEDICATION LIST DOCUMENTED IN MEDICAL RECORD: ICD-10-PCS | Mod: S$GLB,,, | Performed by: INTERNAL MEDICINE

## 2020-10-19 PROCEDURE — 1101F PT FALLS ASSESS-DOCD LE1/YR: CPT | Mod: S$GLB,,, | Performed by: INTERNAL MEDICINE

## 2020-10-19 PROCEDURE — 1126F PR PAIN SEVERITY QUANTIFIED, NO PAIN PRESENT: ICD-10-PCS | Mod: S$GLB,,, | Performed by: INTERNAL MEDICINE

## 2020-10-19 NOTE — PATIENT INSTRUCTIONS
Chronic Kidney Disease (CKD)     The role of the kidneys is to remove waste products and extra water from the blood.  When the kidneys do not work as they should, waste products begin to build up in the blood. This is called chronic kidney disease (CKD). CKD means that you have kidney damage or a decrease in kidney function lasting at least 3 months. CKD allows extra water, waste, and toxins to build up in the body. This can eventually become life-threatening. You might need dialysis or a kidney transplant to stay alive. This most severe form is called end stage renal disease.  Diabetes is the leading causes of chronic renal failure. Other causes include high blood pressure, hardening of the arteries (atherosclerosis), lupus, inflammation of the blood vessels (vasculitis), and past viral or bacterial infections. Certain over-the-counter pain medicines can cause renal failure when taken often over a long period of time. These include aspirin, ibuprofen, and related anti-inflammatory medicines called NSAIDs (nonsteroidal anti-inflammatory drugs).  Home care  The following guidelines will help you care for yourself at home:  · If you have diabetes, talk with your healthcare provider about keeping your blood sugar under control. Ask if you need to make and changes to your diet, lifestyle, or medicines.  · If you have high blood pressure:  ¨ Take prescribed medicine to lower your blood pressure to the recommended goal of less than 130/80.  ¨ Start a regular exercise program that you enjoy. Check with your healthcare provider to be sure your planned exercise program is right for you.  ¨ Eat less salt (sodium). Your healthcare provider can tell you how much salt per day is safe for you.  · If you are overweight, talk with your healthcare provider about a weight loss plan.  · If you smoke, you must quit. Smoking makes kidney disease worse. Talk with your healthcare provider about ways to help you quit.  For more  information, visit the following links:  ¨ www.smokefree.gov/sites/default/files/pdf/clearing-the-air-accessible.pdf  ¨ www.smokefree.gov  ¨ www.cancer.org/healthy/stayawayfromtobacco/guidetoquittingsmoking/  · Most people with CKD need to follow a special diet.  Be sure you understand yours. In general, you will need to limit protein, salt, potassium, and phosphorus. You also need to limit how much fluid you drink.   · CKD is a risk factor for heart disease. Talk with your healthcare provider about any other risk factors you might have and what you can do to lessen them.  · Talk with your healthcare provider about any medicines you are taking to find out if they need to be reduced or stopped.  · Don't use the following over-the-counter medicines, or consult your healthcare provider before using:  ¨ Aspirin and NSAIDs such as ibuprofen or naproxen. Using acetaminophen for fever or pain is OK.  ¨ Laxatives and antacids containing magnesium or aluminum  ¨ Fleet or phospho soda enemas containing phosphorus  ¨ Certain stomach acid-blocking medicine such as cimetidine or ranitidine   ¨ Decongestants containing pseudoephedrine   ¨ Herbal supplements  Follow-up care  Follow up with your healthcare provider, or as advised. Contact one of the following for more information:  · American Association of Kidney Patients 811-554-7899 www.aakp.org  · National Kidney Foundation 814-891-5167 www.kidney.org  · American Kidney Fund 401-720-7702 www.kidneyfund.org  · National Kidney Disease Education Program 866-4KIDNEY www.nkdep.nih.gov  If an X-ray, ECG (cardiogram), or other diagnostic test was taken, you will be told of any new findings that may affect your care.  Call 911  Call 911 if you have any of the following:  · Severe weakness, dizziness, fainting, drowsiness, or confusion  · Chest pain or shortness of breath  · Heart beating fast, slow, or irregularly  When to seek medical advice  Call your healthcare provider right away  if any of these occur:  · Nausea or vomiting  · Fever of 100.4°F (38°C) or higher, or as directed by your healthcare provider  · Unexpected weight gain or swelling in the legs, ankles, or around the eyes  · Decrease or absent urine output  Date Last Reviewed: 9/1/2016  © 2952-5383 [employer]. [address] [place] [address], [place] [address]. All rights reserved. This information is not intended as a substitute for professional medical care. Always follow your healthcare professional's instructions.        Renal Insufficiency  Your kidneys remove waste products and extra water from your body. When your kidneys dont work as they should, waste products build up in your blood. The early stage of this process is called renal insufficiency. If renal insufficiency gets worse, you can develop chronic renal failure. This allows extra water, waste, and toxic substances to build up in your body. This can become life threatening. You may need dialysis or a kidney transplant. The most serious form of renal insufficiency is end-stage renal disease.  Diabetes is the main cause of renal insufficiency.  Other causes include:  · High blood pressure  · Hardening of the arteries  · Lupus  · Inflammation of the blood vessels (vasculitis)  · Viral or bacterial infection  Some over-the-counter (OTC) pain medicines can cause renal failure if you take them for a long time. These include aspirin, ibuprofen, naproxen, and other nonsteroidal anti-inflammatory drugs (NSAIDs).  Home care  Follow these tips when caring for yourself at home:  · If you have diabetes, talk with your healthcare provider about controlling your blood sugar. Ask if you need to make any changes to your diet, lifestyle, or medicines.  · If you have high blood pressure:  ¨ Take your prescribed medicine. Your goal is to lower your blood pressure to less than 130/80, or as recommended by your provider.  ¨ Start a regular exercise program that you enjoy. Check with  your healthcare provider to be sure your planned exercise program is right for you.  ¨ Cut back on the amount of salt (sodium) you eat. Your healthcare provider can tell you how much salt each day is safe for you.  · If you are overweight, talk with your healthcare provider about a weight loss plan.  · If you smoke, quit. Smoking makes kidney disease worse. Talk with your healthcare provider about ways to help you quit. For more information, visit:  ¨ smokefree.gov/sites/default/files/pdf/clearing-the-air-accessible.pdf  ¨ www.smokefree.gov  ¨ www.cancer.org/healthy/stayawayfromtobacco/guidetoquittingsmoking/  · Talk with your healthcare provider about any restrictions you should make in your diet. In general, you should limit the amount of protein, salt, potassium, and phosphorus. Dont drink too many fluids. Dont add salt at the table, and stay away from salty foods. You may need a calcium supplement to help prevent osteoporosis.  · Talk with your healthcare provider about any medicines you are taking to find out if they need to be reduced or stopped.  · Dont take the following OTC medicines, or talk with your healthcare provider before you take them:  ¨ Aspirin, ibuprofen, naproxen, and other NSAIDs. You may be able to use these for a short time to help with fever or pain.  ¨ Laxatives and antacids with magnesium or aluminum  ¨ Phospho soda enemas with phosphorus  ¨ Certain stomach acid-blocking medicine such as cimetidine or ranitidine  ¨ Decongestants with pseudoephedrine  ¨ Herbal supplements  Follow-up care  Follow up with your healthcare provider, or as advised.  Contact one of the following for more information:  · American Association of Kidney Patients www.aakp.org  · National Kidney Foundation www.kidney.org  · American Kidney Fund www.kidneyfund.org  · National Kidney Disease Education Program www.nkdep.nih.gov  Call 911  Call 911 if any of the following occur:  · Severe weakness, dizziness, fainting,  drowsiness, or confusion  · Chest pain or shortness of breath  · Heart beating fast, slowly, or irregularly  When to seek medical advice  Call your healthcare provider right away if any of these occur:  · Nausea or vomiting  · Fever of 100.4°F (38°C) or higher, or as directed by your healthcare provider  · Unexpected weight gain or swelling in the legs, ankles, or around your eyes  · You dont urinate as much as normal, or you arent able to urinate  Date Last Reviewed: 10/1/2016  © 7943-0629 [employer]. [address] [place] [address]. All rights reserved. This information is not intended as a substitute for professional medical care. Always follow your healthcare professional's instructions.

## 2020-10-19 NOTE — TELEPHONE ENCOUNTER
----- Message from Kameron Akhtar MD sent at 10/18/2020 10:23 PM CDT -----  Notify patient that his thyroid test is within normal range.  Cholesterol is 223 which is better than last year of 243 and LDL cholesterol is also better at 151 as compared to 172 last year.  However his creatinine level which is a kidney test has jumped up from 1.6 to 1.8.  Is he taking enough fluids and keeping hydrated?  Is he passing adequate amount of urine?  I would like to repeat a basic metabolic panel again after he remains hydrated for a week.  Please pend that for me.

## 2020-10-19 NOTE — PROGRESS NOTES
Notify patient that his thyroid test is within normal range.  Cholesterol is 223 which is better than last year of 243 and LDL cholesterol is also better at 151 as compared to 172 last year.  However his creatinine level which is a kidney test has jumped up from 1.6 to 1.8.  Is he taking enough fluids and keeping hydrated?  Is he passing adequate amount of urine?  I would like to repeat a basic metabolic panel again after he remains hydrated for a week.  Please pend that for me.

## 2020-10-19 NOTE — PROGRESS NOTES
Subjective:       Patient ID: Jaswinder Reina is a 75 y.o. male.    Chief Complaint: Hypertension (lab revie w) and Thyroid Problem    Mr. alton khan is a 75-year-old  gentleman comes for follow-up.  Underlying medical issues of hypertension, hypothyroidism have been noted.  He also has hyperlipidemia but is not on any medications.    Recent labs have shown for the elevation of his creatinine levels.  His creatinine used to be around 1.5 or 1.6 with the recent labs have shown it to be 1.8.  '    In 2017 his creatinine was 1.35.    15-16 years ago his creatinine was 1.3 and 1.2 respectively.    Thus far he is not on any nephrotoxic medication.  He does not consume excess of NSAIDs like Motrin, Aleve or Advil.  Perhaps couple of Aleves twice a week when he plays golf.    On review of his epic records and prior records I do not see any investigations like a CT scan which might have involved it diet or contrast.  I do not see any angiogram which might have been involved dye or contrast.  No significant antibiotics.  He denies any significant prostate symptoms or retention of urine symptoms.  Urine flow is reasonable.    Incidentally, also while reviewing his old records I did see an EKG which had shown left bundle-branch block pattern.  This has not been addressed in details in past but will be looked into.  Patient denies any chest pain, palpitations or shortness of breath.  He does not recall any stress test done in past.    Hypertension  This is a chronic problem. The current episode started more than 1 year ago. Pertinent negatives include no chest pain, neck pain, palpitations or shortness of breath. Past treatments include angiotensin blockers. The current treatment provides moderate improvement. Identifiable causes of hypertension include a thyroid problem.   Thyroid Problem  Presents for follow-up visit. Symptoms include cold intolerance (better now). Patient reports no anxiety, constipation,  diaphoresis, diarrhea, fatigue, heat intolerance, hoarse voice, leg swelling, palpitations, tremors, visual change, weight gain or weight loss. The symptoms have been stable.       Past Medical History:   Diagnosis Date    History of colonoscopy 2013    The entire colon was normal. Patient was recommended surveillance colonoscopy in 5 years.    Hyperlipidemia     Hypertension     Hypothyroidism      Social History     Socioeconomic History    Marital status: Single     Spouse name: Not on file    Number of children: 0    Years of education: Not on file    Highest education level: Not on file   Occupational History    Occupation:  [employer] retd   Social Needs    Financial resource strain: Not on file    Food insecurity     Worry: Not on file     Inability: Not on file    Transportation needs     Medical: Not on file     Non-medical: Not on file   Tobacco Use    Smoking status: Former Smoker     Packs/day: 2.00     Types: Cigarettes     Start date: 1962     Quit date: 1973     Years since quittin.8    Smokeless tobacco: Never Used   Substance and Sexual Activity    Alcohol use: Yes     Alcohol/week: 1.0 - 2.0 standard drinks     Types: 1 - 2 Glasses of wine per week     Comment: occ    Drug use: No    Sexual activity: Not Currently     Partners: Female     Comment: Not sexually active   Lifestyle    Physical activity     Days per week: Not on file     Minutes per session: Not on file    Stress: Only a little   Relationships    Social connections     Talks on phone: Not on file     Gets together: Not on file     Attends Ca[employer]ism service: Not on file     Active member of club or organization: Not on file     Attends meetings of clubs or organizations: Not on file     Relationship status: Not on file   Other Topics Concern    Not on file   Social History Narrative    Not on file     Past Surgical History:   Procedure Laterality Date    ADENOIDECTOMY      COLONOSCOPY    "   TONSILLECTOMY       Family History   Problem Relation Age of Onset    Pneumonia Mother     COPD Mother         smoker    Hypertension Father     Suicide Father        Review of Systems   Constitutional: Negative for activity change, chills, diaphoresis, fatigue, fever, weight gain and weight loss.   HENT: Negative for congestion, dental problem, ear discharge, hearing loss, hoarse voice and sinus pain.         In past patient had burning sensation in the tongue which has since practically resolved.  Cause of this was unknown.  He had taken some B complex vitamins at that point.  This may have helped.   Eyes: Negative for photophobia, pain and visual disturbance.   Respiratory: Negative for apnea, choking and shortness of breath.    Cardiovascular: Negative for chest pain, palpitations and leg swelling.   Gastrointestinal: Negative for abdominal distention, abdominal pain, constipation and diarrhea.   Endocrine: Positive for cold intolerance (better now). Negative for heat intolerance.   Genitourinary: Negative for difficulty urinating, dysuria, flank pain, hematuria, scrotal swelling and testicular pain.   Musculoskeletal: Negative for neck pain.   Neurological: Negative for dizziness, tremors, seizures and light-headedness.   Hematological: Negative for adenopathy. Does not bruise/bleed easily.        History of addisonian anemia.   Psychiatric/Behavioral: Negative for agitation and behavioral problems. The patient is not nervous/anxious.          Objective:      Blood pressure 128/76, pulse 71, temperature 97.2 °F (36.2 °C), height 5' 9" (1.753 m), weight 68 kg (150 lb). Body mass index is 22.15 kg/m².  Physical Exam  Vitals signs and nursing note reviewed.   Constitutional:       General: He is not in acute distress.     Appearance: He is well-developed. He is not ill-appearing, toxic-appearing or diaphoretic.      Comments: BMI is 22.15.   HENT:      Head: Normocephalic and atraumatic.      Mouth/Throat: "      Mouth: No oral lesions or angioedema.      Dentition: Normal dentition. No dental tenderness, dental caries or gum lesions.      Tongue: No lesions. Tongue does not deviate from midline.      Palate: No mass and lesions.      Pharynx: No pharyngeal swelling, oropharyngeal exudate, posterior oropharyngeal erythema or uvula swelling.      Comments: Examination of the mouth shows moist tongue.  I do not see any evidence of lesions like leukoplakia or redness.  Examination the gumline and dentition also do not show anything significant.  Eyes:      General: No scleral icterus.     Conjunctiva/sclera: Conjunctivae normal.   Neck:      Musculoskeletal: Normal range of motion and neck supple.      Thyroid: No thyromegaly.      Vascular: No JVD.      Trachea: No tracheal deviation.   Cardiovascular:      Rate and Rhythm: Normal rate and regular rhythm.      Heart sounds: Normal heart sounds. No murmur. No friction rub. No gallop.    Pulmonary:      Effort: Pulmonary effort is normal. No respiratory distress.      Breath sounds: Normal breath sounds.   Abdominal:      General: There is no distension.      Palpations: Abdomen is soft.      Tenderness: There is no abdominal tenderness.   Skin:     General: Skin is warm and dry.   Neurological:      Mental Status: He is alert.           Assessment:       1. Benign essential hypertension    2. Acquired hypothyroidism    3. Hypercholesterolemia    4. Stage 3 chronic kidney disease, unspecified whether stage 3a or 3b CKD    5. Left bundle branch block           Lab Visit on 10/14/2020   Component Date Value Ref Range Status    Cholesterol 10/14/2020 223* 120 - 199 mg/dL Final    Triglycerides 10/14/2020 107  30 - 150 mg/dL Final    HDL 10/14/2020 50  40 - 75 mg/dL Final    LDL Cholesterol 10/14/2020 151.6  63.0 - 159.0 mg/dL Final    Hdl/Cholesterol Ratio 10/14/2020 22.4  20.0 - 50.0 % Final    Total Cholesterol/HDL Ratio 10/14/2020 4.5  2.0 - 5.0 Final    Non-HDL  Cholesterol 10/14/2020 173  mg/dL Final    Sodium 10/14/2020 139  136 - 145 mmol/L Final    Potassium 10/14/2020 4.2  3.5 - 5.1 mmol/L Final    Chloride 10/14/2020 103  95 - 110 mmol/L Final    CO2 10/14/2020 24  23 - 29 mmol/L Final    Glucose 10/14/2020 93  70 - 110 mg/dL Final    BUN, Bld 10/14/2020 28* 8 - 23 mg/dL Final    Creatinine 10/14/2020 1.8* 0.5 - 1.4 mg/dL Final    Calcium 10/14/2020 9.3  8.7 - 10.5 mg/dL Final    Total Protein 10/14/2020 7.5  6.0 - 8.4 g/dL Final    Albumin 10/14/2020 4.2  3.5 - 5.2 g/dL Final    Total Bilirubin 10/14/2020 1.2* 0.1 - 1.0 mg/dL Final    Alkaline Phosphatase 10/14/2020 64  55 - 135 U/L Final    AST 10/14/2020 27  10 - 40 U/L Final    ALT 10/14/2020 21  10 - 44 U/L Final    Anion Gap 10/14/2020 12  8 - 16 mmol/L Final    eGFR if  10/14/2020 41.6* >60 mL/min/1.73 m^2 Final    eGFR if non  10/14/2020 36.0* >60 mL/min/1.73 m^2 Final    TSH 10/14/2020 0.460  0.340 - 5.600 uIU/mL Final   Lab Visit on 08/26/2020   Component Date Value Ref Range Status    WBC 08/26/2020 7.71  3.90 - 12.70 K/uL Final    RBC 08/26/2020 4.06* 4.60 - 6.20 M/uL Final    Hemoglobin 08/26/2020 12.6* 14.0 - 18.0 g/dL Final    Hematocrit 08/26/2020 38.6* 40.0 - 54.0 % Final    Mean Corpuscular Volume 08/26/2020 95  82 - 98 fL Final    Mean Corpuscular Hemoglobin 08/26/2020 31.0  27.0 - 31.0 pg Final    Mean Corpuscular Hemoglobin Conc 08/26/2020 32.6  32.0 - 36.0 g/dL Final    RDW 08/26/2020 12.7  11.5 - 14.5 % Final    Platelets 08/26/2020 307  150 - 350 K/uL Final    MPV 08/26/2020 10.1  9.2 - 12.9 fL Final    Immature Granulocytes 08/26/2020 0.3  0.0 - 0.5 % Final    Gran # (ANC) 08/26/2020 5.1  1.8 - 7.7 K/uL Final    Immature Grans (Abs) 08/26/2020 0.02  0.00 - 0.04 K/uL Final    Lymph # 08/26/2020 1.3  1.0 - 4.8 K/uL Final    Mono # 08/26/2020 1.0  0.3 - 1.0 K/uL Final    Eos # 08/26/2020 0.2  0.0 - 0.5 K/uL Final    Baso #  08/26/2020 0.07  0.00 - 0.20 K/uL Final    nRBC 08/26/2020 0  0 /100 WBC Final    Gran% 08/26/2020 65.9  38.0 - 73.0 % Final    Lymph% 08/26/2020 17.4* 18.0 - 48.0 % Final    Mono% 08/26/2020 13.2  4.0 - 15.0 % Final    Eosinophil% 08/26/2020 2.3  0.0 - 8.0 % Final    Basophil% 08/26/2020 0.9  0.0 - 1.9 % Final    Differential Method 08/26/2020 Automated   Final    Vitamin B-12 08/26/2020 313  210 - 950 pg/mL Final         Plan:           Benign essential hypertension    Acquired hypothyroidism    Hypercholesterolemia    Stage 3 chronic kidney disease, unspecified whether stage 3a or 3b CKD  -     US Retroperitoneal Complete (Kidney and; Future; Expected date: 10/19/2020  -     Renal function panel; Future; Expected date: 02/08/2021  -     Magnesium; Future; Expected date: 02/08/2021    Left bundle branch block  Comments:  This I have incidentally found in his old medical records done in 2013 when he was referred by Dr. Matt, orthopedics/hands for preoperative checkup.      Patient's blood pressures are doing good.  He is currently taking losartan.    Thyroid status is stable at this point.    He has hyperlipidemia and we may make the decision as to how to treat it in future based upon how his kidneys do.    His creatinine has jumped up to 1.8 now and I think we need to investigate it further.  He had shown a steady trend of rise over the last several years.  It used to be around 1.5 or 1.6 till the last few years.    He is not taking any NSAIDs or anti-inflammatory medications.  He is not taking any creatinine supplement.  He does tend to keep himself hydrated.  No family history of kidney disease that he can recall.    I will start of with a kidney ultrasound and bladder ultrasound to check for the residuals.  Will take it from there.    Incidentally reviewed old EKG had shown left bundle-branch block.  No symptoms at this point.  Will address this issue at follow-up.    Continue with COVID-19  precautions.    Keep hydrated.    He is updated on the influenza vaccination for current season.    Patient will be notified about the ultrasound results and will see if there is any evidence of chronic kidney disease like shrunken kidneys or evidence of any prostate enlargement or bladder residual.    Follow-up in  3-4 months    Spent oziel 25 minutes with patient which involved review of pts medical conditions, labs, medications and with 50% of time face-to-face discussion about medical problems, management and any applicable changes.      Current Outpatient Medications:     levothyroxine (SYNTHROID) 100 MCG tablet, TAKE 1 TABLET(100 MCG) BY MOUTH BEFORE BREAKFAST, Disp: 90 tablet, Rfl: 2    losartan (COZAAR) 50 MG tablet, 1 EVERY EVENING, Disp: 90 tablet, Rfl: 2

## 2020-10-26 ENCOUNTER — HOSPITAL ENCOUNTER (OUTPATIENT)
Dept: RADIOLOGY | Facility: HOSPITAL | Age: 75
Discharge: HOME | End: 2020-10-26
Attending: INTERNAL MEDICINE
Payer: COMMERCIAL

## 2020-10-26 DIAGNOSIS — N18.30 STAGE 3 CHRONIC KIDNEY DISEASE, UNSPECIFIED WHETHER STAGE 3A OR 3B CKD: ICD-10-CM

## 2020-10-26 PROCEDURE — 76857 US EXAM PELVIC LIMITED: CPT | Mod: TC,PO

## 2020-10-26 PROCEDURE — 76770 US EXAM ABDO BACK WALL COMP: CPT | Mod: TC,PO

## 2020-10-27 NOTE — PROGRESS NOTES
I did discuss the renal ultrasound and bladder ultrasound results with the patient.  Slightly smaller kidneys at 8.9 and 9.3 cm.  No significant heterogeneous texture to indicate significant kidney disease.  Some cysts have been noted which appear to be benign.  Bladder ultrasound was essentially unremarkable with no significant residual volume of urine.  He does complain of some unusual taste and sensation in the tongue and lips.  I am not sure if this is uremic or some other etiology.  Continue with vitamins.

## 2020-11-28 ENCOUNTER — HOSPITAL ENCOUNTER (EMERGENCY)
Facility: HOSPITAL | Age: 75
Discharge: HOME | End: 2020-11-28
Attending: EMERGENCY MEDICINE
Payer: COMMERCIAL

## 2020-11-28 VITALS
HEART RATE: 71 BPM | RESPIRATION RATE: 18 BRPM | HEIGHT: 69 IN | WEIGHT: 150 LBS | TEMPERATURE: 98 F | OXYGEN SATURATION: 99 % | SYSTOLIC BLOOD PRESSURE: 158 MMHG | DIASTOLIC BLOOD PRESSURE: 70 MMHG | BODY MASS INDEX: 22.22 KG/M2

## 2020-11-28 DIAGNOSIS — M54.50 ACUTE LEFT-SIDED LOW BACK PAIN WITHOUT SCIATICA: Primary | ICD-10-CM

## 2020-11-28 LAB
ALBUMIN SERPL BCP-MCNC: 4 G/DL (ref 3.5–5.2)
ALP SERPL-CCNC: 61 U/L (ref 55–135)
ALT SERPL W/O P-5'-P-CCNC: 18 U/L (ref 10–44)
ANION GAP SERPL CALC-SCNC: 6 MMOL/L (ref 8–16)
AST SERPL-CCNC: 23 U/L (ref 10–40)
BASOPHILS # BLD AUTO: 0.08 K/UL (ref 0–0.2)
BASOPHILS NFR BLD: 1 % (ref 0–1.9)
BILIRUB SERPL-MCNC: 1 MG/DL (ref 0.1–1)
BILIRUB UR QL STRIP: NEGATIVE
BUN SERPL-MCNC: 19 MG/DL (ref 8–23)
CALCIUM SERPL-MCNC: 9.1 MG/DL (ref 8.7–10.5)
CHLORIDE SERPL-SCNC: 104 MMOL/L (ref 95–110)
CLARITY UR: CLEAR
CO2 SERPL-SCNC: 27 MMOL/L (ref 23–29)
COLOR UR: YELLOW
CREAT SERPL-MCNC: 1.6 MG/DL (ref 0.5–1.4)
DIFFERENTIAL METHOD: ABNORMAL
EOSINOPHIL # BLD AUTO: 0 K/UL (ref 0–0.5)
EOSINOPHIL NFR BLD: 0.3 % (ref 0–8)
ERYTHROCYTE [DISTWIDTH] IN BLOOD BY AUTOMATED COUNT: 12.7 % (ref 11.5–14.5)
EST. GFR  (AFRICAN AMERICAN): 48 ML/MIN/1.73 M^2
EST. GFR  (NON AFRICAN AMERICAN): 41.5 ML/MIN/1.73 M^2
GLUCOSE SERPL-MCNC: 110 MG/DL (ref 70–110)
GLUCOSE UR QL STRIP: NEGATIVE
HCT VFR BLD AUTO: 41.5 % (ref 40–54)
HGB BLD-MCNC: 13.4 G/DL (ref 14–18)
HGB UR QL STRIP: NEGATIVE
IMM GRANULOCYTES # BLD AUTO: 0.02 K/UL (ref 0–0.04)
IMM GRANULOCYTES NFR BLD AUTO: 0.3 % (ref 0–0.5)
KETONES UR QL STRIP: NEGATIVE
LEUKOCYTE ESTERASE UR QL STRIP: NEGATIVE
LYMPHOCYTES # BLD AUTO: 1 K/UL (ref 1–4.8)
LYMPHOCYTES NFR BLD: 12.6 % (ref 18–48)
MCH RBC QN AUTO: 31.1 PG (ref 27–31)
MCHC RBC AUTO-ENTMCNC: 32.3 G/DL (ref 32–36)
MCV RBC AUTO: 96 FL (ref 82–98)
MONOCYTES # BLD AUTO: 0.6 K/UL (ref 0.3–1)
MONOCYTES NFR BLD: 7.2 % (ref 4–15)
NEUTROPHILS # BLD AUTO: 6.1 K/UL (ref 1.8–7.7)
NEUTROPHILS NFR BLD: 78.6 % (ref 38–73)
NITRITE UR QL STRIP: NEGATIVE
NRBC BLD-RTO: 0 /100 WBC
PH UR STRIP: 7 [PH] (ref 5–8)
PLATELET # BLD AUTO: 363 K/UL (ref 150–350)
PMV BLD AUTO: 9.4 FL (ref 9.2–12.9)
POTASSIUM SERPL-SCNC: 4.9 MMOL/L (ref 3.5–5.1)
PROT SERPL-MCNC: 7.1 G/DL (ref 6–8.4)
PROT UR QL STRIP: NEGATIVE
RBC # BLD AUTO: 4.31 M/UL (ref 4.6–6.2)
SODIUM SERPL-SCNC: 137 MMOL/L (ref 136–145)
SP GR UR STRIP: 1.01 (ref 1–1.03)
URN SPEC COLLECT METH UR: NORMAL
UROBILINOGEN UR STRIP-ACNC: NEGATIVE EU/DL
WBC # BLD AUTO: 7.69 K/UL (ref 3.9–12.7)

## 2020-11-28 PROCEDURE — 99284 EMERGENCY DEPT VISIT MOD MDM: CPT | Mod: 25

## 2020-11-28 PROCEDURE — 80053 COMPREHEN METABOLIC PANEL: CPT

## 2020-11-28 PROCEDURE — 85025 COMPLETE CBC W/AUTO DIFF WBC: CPT

## 2020-11-28 PROCEDURE — 81003 URINALYSIS AUTO W/O SCOPE: CPT

## 2020-11-28 PROCEDURE — 36415 COLL VENOUS BLD VENIPUNCTURE: CPT

## 2020-11-28 RX ORDER — METHOCARBAMOL 500 MG/1
1000 TABLET, FILM COATED ORAL 3 TIMES DAILY
Qty: 30 TABLET | Refills: 0 | Status: SHIPPED | OUTPATIENT
Start: 2020-11-28 | End: 2020-12-03

## 2020-11-28 NOTE — ED PROVIDER NOTES
Encounter Date: 2020       History     Chief Complaint   Patient presents with    Back Pain     LEFT SIDE, DENIES PULL INJURY OR FALL, ONSET LASTNITE     Patient here with reported left low back pain does not radiate has been constant with intermittent worsening he is concerned it might be his kidney he denies any known injury he denies any recent illness no fever chills no dysuria urgency or frequency no hematuria no fever chills no cough no shortness of breath no anterior abdominal pain no previous history of similar pain        Review of patient's allergies indicates:  No Known Allergies  Past Medical History:   Diagnosis Date    History of colonoscopy 2013    The entire colon was normal. Patient was recommended surveillance colonoscopy in 5 years.    Hyperlipidemia     Hypertension     Hypothyroidism      Past Surgical History:   Procedure Laterality Date    ADENOIDECTOMY      COLONOSCOPY      TONSILLECTOMY       Family History   Problem Relation Age of Onset    Pneumonia Mother     COPD Mother         smoker    Hypertension Father     Suicide Father      Social History     Tobacco Use    Smoking status: Former Smoker     Packs/day: 2.00     Types: Cigarettes     Start date: 1962     Quit date: 1973     Years since quittin.9    Smokeless tobacco: Never Used   Substance Use Topics    Alcohol use: Yes     Alcohol/week: 1.0 - 2.0 standard drinks     Types: 1 - 2 Glasses of wine per week     Comment: occ    Drug use: No     Review of Systems   Constitutional: Negative for chills and fever.   HENT: Negative for congestion, ear pain, rhinorrhea and sore throat.    Eyes: Negative for discharge.   Respiratory: Negative for cough and shortness of breath.    Cardiovascular: Negative for chest pain and leg swelling.   Gastrointestinal: Negative for abdominal pain, blood in stool, constipation, diarrhea, nausea and vomiting.   Genitourinary: Negative for dysuria and flank pain.    Musculoskeletal: Positive for back pain. Negative for myalgias.   Skin: Negative for rash.   Neurological: Negative for headaches.   Hematological: Negative for adenopathy.       Physical Exam     Initial Vitals [11/28/20 0823]   BP Pulse Resp Temp SpO2   (!) 177/81 90 18 98.2 °F (36.8 °C) 99 %      MAP       --         Physical Exam    Constitutional: He appears well-developed and well-nourished. No distress.   HENT:   Head: Normocephalic and atraumatic.   Right Ear: External ear normal.   Left Ear: External ear normal.   Mouth/Throat: Oropharynx is clear and moist.   Eyes: Pupils are equal, round, and reactive to light.   Neck: Normal range of motion. Neck supple.   Cardiovascular: Normal rate, regular rhythm, S1 normal, S2 normal, normal heart sounds and intact distal pulses.   Pulmonary/Chest: Breath sounds normal. No respiratory distress.   Abdominal: Soft. Normal appearance and bowel sounds are normal. There is no abdominal tenderness.   Genitourinary:    Genitourinary Comments: No CVA tenderness     Musculoskeletal: Normal range of motion. No tenderness.   Neurological: He is alert and oriented to person, place, and time. GCS eye subscore is 4. GCS verbal subscore is 5. GCS motor subscore is 6.   Skin: Skin is warm and dry. Capillary refill takes less than 2 seconds. No rash noted.   Psychiatric: He has a normal mood and affect. His behavior is normal.         ED Course   Procedures  Labs Reviewed   CBC W/ AUTO DIFFERENTIAL - Abnormal; Notable for the following components:       Result Value    RBC 4.31 (*)     Hemoglobin 13.4 (*)     MCH 31.1 (*)     Platelets 363 (*)     Gran % 78.6 (*)     Lymph % 12.6 (*)     All other components within normal limits   COMPREHENSIVE METABOLIC PANEL - Abnormal; Notable for the following components:    Creatinine 1.6 (*)     Anion Gap 6 (*)     eGFR if  48.0 (*)     eGFR if non  41.5 (*)     All other components within normal limits    URINALYSIS, REFLEX TO URINE CULTURE    Narrative:     Specimen Source->Urine          Imaging Results          CT Renal Stone Study ABD Pelvis WO (Final result)  Result time 11/28/20 09:25:21    Final result by Lavonne Jeffery MD (11/28/20 09:25:21)                 Impression:      No evidence of renal stones.  There bilateral renal cysts.    Moderate size hiatal hernia    Levoscoliosis of the lumbar spine with grade 1 anterolisthesis of L4 on L5      Electronically signed by: Lavonne Jeffery MD  Date:    11/28/2020  Time:    09:25             Narrative:      CMS MANDATED QUALITY DATA - CT RADIATION - 436    All CT scans at this facility utilize dose modulation, iterative reconstruction, and/or weight based dosing when appropriate to reduce radiation dose to as low as reasonably achievable.    EXAMINATION:  CT RENAL STONE STUDY ABD PELVIS WO    CLINICAL HISTORY:  Flank pain, kidney stone suspected;    TECHNIQUE:  CT abdomen and pelvis without IV or oral contrast. Study is tailored for detection of urinary tract calculi and evaluation of solid organs, hollow viscera, and vascular structures is limited.    COMPARISON:  Ultrasound dated 10/26/2020    FINDINGS:  CT Abdomen:    The lung bases are clear.  There is no pericardial effusion.    The liver, spleen and pancreas have a normal noncontrast appearance.  The gallbladder and adrenal glands are normal.    The kidneys are symmetric in size without hydronephrosis or calculi.  There are bilateral renal cysts the largest on the left measuring 4 cm.  The largest on the right measuring 3 cm.    There are no thick-walled or dilated bowel loops.  There is a moderate size hiatal hernia.  There is no adenopathy.  There is diffuse vascular calcification of the aorta.    CT Pelvis:    The bladder and prostate gland are normal.  There is no pelvic adenopathy.  There are no distal ureteral stones.    There is levoscoliosis of the lumbar spine with multilevel degenerative disc  disease.  There is mild anterolisthesis of L4 on L5.                                 Medical Decision Making:   ED Management:  Laboratory evaluation reviewed CT scan shows no evidence of nephrolithiasis there is incidental evidence of hiatal hernia renal cyst also degenerative changes in the spine I suspect patient's pain is most likely musculoskeletal in nature will treat with Robaxin rest outpatient follow-up                             Clinical Impression:       ICD-10-CM ICD-9-CM   1. Acute left-sided low back pain without sciatica  M54.5 724.2                          ED Disposition Condition    Discharge Stable        ED Prescriptions     Medication Sig Dispense Start Date End Date Auth. Provider    methocarbamoL (ROBAXIN) 500 MG Tab Take 2 tablets (1,000 mg total) by mouth 3 (three) times daily. for 5 days 30 tablet 11/28/2020 12/3/2020 Carlos Bustos MD        Follow-up Information     Follow up With Specialties Details Why Contact Info    Kameron Akhtar MD Internal Medicine  for re-examination of your symptoms [address]  [address]  [facility] [address]  [phone]                                         Carlos Bustos MD  11/28/20 3878

## 2020-11-30 ENCOUNTER — OFFICE VISIT (OUTPATIENT)
Dept: FAMILY MEDICINE | Facility: CLINIC | Age: 75
End: 2020-11-30
Payer: COMMERCIAL

## 2020-11-30 VITALS
SYSTOLIC BLOOD PRESSURE: 133 MMHG | BODY MASS INDEX: 22.07 KG/M2 | HEIGHT: 69 IN | WEIGHT: 149 LBS | HEART RATE: 78 BPM | TEMPERATURE: 97 F | DIASTOLIC BLOOD PRESSURE: 77 MMHG

## 2020-11-30 DIAGNOSIS — M41.80 LEVOSCOLIOSIS: ICD-10-CM

## 2020-11-30 DIAGNOSIS — N28.1 BILATERAL RENAL CYSTS: ICD-10-CM

## 2020-11-30 DIAGNOSIS — M43.10 ANTEROLISTHESIS: ICD-10-CM

## 2020-11-30 DIAGNOSIS — K21.00 GASTROESOPHAGEAL REFLUX DISEASE WITH ESOPHAGITIS WITHOUT HEMORRHAGE: ICD-10-CM

## 2020-11-30 DIAGNOSIS — K44.9 ESOPHAGEAL HIATAL HERNIA: ICD-10-CM

## 2020-11-30 DIAGNOSIS — K14.6 TONGUE BURNING SENSATION: Primary | ICD-10-CM

## 2020-11-30 PROCEDURE — 3078F PR MOST RECENT DIASTOLIC BLOOD PRESSURE < 80 MM HG: ICD-10-PCS | Mod: S$GLB,,, | Performed by: INTERNAL MEDICINE

## 2020-11-30 PROCEDURE — 3078F DIAST BP <80 MM HG: CPT | Mod: S$GLB,,, | Performed by: INTERNAL MEDICINE

## 2020-11-30 PROCEDURE — 3288F PR FALLS RISK ASSESSMENT DOCUMENTED: ICD-10-PCS | Mod: S$GLB,,, | Performed by: INTERNAL MEDICINE

## 2020-11-30 PROCEDURE — 99213 PR OFFICE/OUTPT VISIT, EST, LEVL III, 20-29 MIN: ICD-10-PCS | Mod: S$GLB,,, | Performed by: INTERNAL MEDICINE

## 2020-11-30 PROCEDURE — 1101F PR PT FALLS ASSESS DOC 0-1 FALLS W/OUT INJ PAST YR: ICD-10-PCS | Mod: S$GLB,,, | Performed by: INTERNAL MEDICINE

## 2020-11-30 PROCEDURE — 3288F FALL RISK ASSESSMENT DOCD: CPT | Mod: S$GLB,,, | Performed by: INTERNAL MEDICINE

## 2020-11-30 PROCEDURE — 1101F PT FALLS ASSESS-DOCD LE1/YR: CPT | Mod: S$GLB,,, | Performed by: INTERNAL MEDICINE

## 2020-11-30 PROCEDURE — 3075F SYST BP GE 130 - 139MM HG: CPT | Mod: S$GLB,,, | Performed by: INTERNAL MEDICINE

## 2020-11-30 PROCEDURE — 1126F PR PAIN SEVERITY QUANTIFIED, NO PAIN PRESENT: ICD-10-PCS | Mod: S$GLB,,, | Performed by: INTERNAL MEDICINE

## 2020-11-30 PROCEDURE — 1126F AMNT PAIN NOTED NONE PRSNT: CPT | Mod: S$GLB,,, | Performed by: INTERNAL MEDICINE

## 2020-11-30 PROCEDURE — 3075F PR MOST RECENT SYSTOLIC BLOOD PRESS GE 130-139MM HG: ICD-10-PCS | Mod: S$GLB,,, | Performed by: INTERNAL MEDICINE

## 2020-11-30 PROCEDURE — 1159F MED LIST DOCD IN RCRD: CPT | Mod: S$GLB,,, | Performed by: INTERNAL MEDICINE

## 2020-11-30 PROCEDURE — 1159F PR MEDICATION LIST DOCUMENTED IN MEDICAL RECORD: ICD-10-PCS | Mod: S$GLB,,, | Performed by: INTERNAL MEDICINE

## 2020-11-30 PROCEDURE — 99213 OFFICE O/P EST LOW 20 MIN: CPT | Mod: S$GLB,,, | Performed by: INTERNAL MEDICINE

## 2020-11-30 RX ORDER — PANTOPRAZOLE SODIUM 20 MG/1
20 TABLET, DELAYED RELEASE ORAL DAILY
Qty: 30 TABLET | Refills: 2 | Status: SHIPPED | OUTPATIENT
Start: 2020-11-30 | End: 2021-03-01 | Stop reason: SDUPTHER

## 2020-11-30 NOTE — PROGRESS NOTES
Subjective:       Patient ID: Jaswinder Reina is a 75 y.o. male.    Chief Complaint: Hypertension (bp), acid taste in mouth, and Back Pain      Mr. Jaswinder khan is a pleasant 75-year-old  gentleman for her to go to the emergency room the other weekend.  He had some left flank pain.  He is also having this acid like sensation at the back of his tongue.    He did have a kidney stone renal cell protocol which showed the findings as below.  Bottom line was there was no kidney stone.  Some scoliosis in the spine and also a moderate-size hiatal hernia.  Also bilateral kidney cysts.  (These findings have been discussed with the patient).        He was not prescribed any medications especially for the acid in his mouth.  He was prescribed Robaxin for his back pain and he took only 1 pill and he did well after that.    He loves is 2 cups of coffee and 2 or 3 cups of tea per day.  He cannot imagine a life without coffee or tea.  However he is willing to give it a try for cutting it down for perhaps 1 week as an experiment.    Impression:     No evidence of renal stones.  There bilateral renal cysts.     Moderate size hiatal hernia     Levoscoliosis of the lumbar spine with grade 1 anterolisthesis of L4 on L5    Above are findings from CT scan.  Past Medical History:   Diagnosis Date    GERD (gastroesophageal reflux disease)     History of colonoscopy 6/12/2013    The entire colon was normal. Patient was recommended surveillance colonoscopy in 5 years.    Hyperlipidemia     Hypertension     Hypothyroidism      Social History     Socioeconomic History    Marital status: Single     Spouse name: Not on file    Number of children: 0    Years of education: Not on file    Highest education level: Not on file   Occupational History    Occupation:  [employer] retd   Social Needs    Financial resource strain: Not on file    Food insecurity     Worry: Not on file     Inability: Not on file     Transportation needs     Medical: Not on file     Non-medical: Not on file   Tobacco Use    Smoking status: Former Smoker     Packs/day: 2.00     Types: Cigarettes     Start date: 1962     Quit date: 1973     Years since quittin.9    Smokeless tobacco: Never Used   Substance and Sexual Activity    Alcohol use: Yes     Alcohol/week: 1.0 - 2.0 standard drinks     Types: 1 - 2 Glasses of wine per week     Comment: occ    Drug use: No    Sexual activity: Not Currently     Partners: Female     Comment: Not sexually active   Lifestyle    Physical activity     Days per week: Not on file     Minutes per session: Not on file    Stress: Only a little   Relationships    Social connections     Talks on phone: Not on file     Gets together: Not on file     Attends [employer] service: Not on file     Active member of club or organization: Not on file     Attends meetings of clubs or organizations: Not on file     Relationship status: Not on file   Other Topics Concern    Not on file   Social History Narrative    Not on file     Past Surgical History:   Procedure Laterality Date    ADENOIDECTOMY      COLONOSCOPY      TONSILLECTOMY       Family History   Problem Relation Age of Onset    Pneumonia Mother     COPD Mother         smoker    Hypertension Father     Suicide Father        Review of Systems   Constitutional: Negative for activity change, chills, diaphoresis, fatigue and fever.   HENT: Negative for congestion, dental problem, ear discharge, hearing loss and sinus pain.         In past patient had burning sensation in the tongue which has since practically resolved.  Cause of this was unknown.  He had taken some B complex vitamins at that point.  This may have helped.    Today looking back, it seems that the acid at the back of his tongue is what probably caused him the burning sensation in his tongue.  This is what he presented recently to the ER.   Eyes: Negative for photophobia, pain and  "visual disturbance.   Respiratory: Negative for apnea, choking and shortness of breath.    Cardiovascular: Negative for chest pain, palpitations and leg swelling.   Gastrointestinal: Negative for abdominal distention, abdominal pain, constipation and diarrhea.   Endocrine: Positive for cold intolerance (better now). Negative for heat intolerance.   Genitourinary: Negative for difficulty urinating, dysuria, flank pain, hematuria, scrotal swelling and testicular pain.        Patient had some low back pain and some flank pain.  He had a CT scan noncontrast with kidney stone protocol in the emergency department which was essentially unremarkable for kidney stones.  It did show some scoliosis in the lumbar spine.  Some degenerative arthritis in the lumbar spine and some cysts which are benign in both the kidneys.   Musculoskeletal: Negative for neck pain.   Neurological: Negative for dizziness, tremors, seizures and light-headedness.   Hematological: Negative for adenopathy. Does not bruise/bleed easily.        History of addisonian anemia.   Psychiatric/Behavioral: Negative for agitation and behavioral problems. The patient is not nervous/anxious.          Objective:      Blood pressure 133/77, pulse 78, temperature 97 °F (36.1 °C), height 5' 9" (1.753 m), weight 67.6 kg (149 lb). Body mass index is 22 kg/m².  Physical Exam  Vitals signs and nursing note reviewed.   Constitutional:       General: He is not in acute distress.     Appearance: Normal appearance. He is well-developed. He is not ill-appearing, toxic-appearing or diaphoretic.      Comments: BMI is 22   HENT:      Head: Normocephalic and atraumatic.      Mouth/Throat:      Mouth: No oral lesions or angioedema.      Dentition: Normal dentition. No dental tenderness, dental caries or gum lesions.      Tongue: No lesions. Tongue does not deviate from midline.      Palate: No mass and lesions.      Pharynx: No pharyngeal swelling, oropharyngeal exudate, posterior " oropharyngeal erythema or uvula swelling.      Comments: Examination of the mouth shows moist tongue.  I do not see any evidence of lesions like leukoplakia or redness.  Examination the gumline and dentition also do not show anything significant.  Eyes:      General: No scleral icterus.     Conjunctiva/sclera: Conjunctivae normal.   Neck:      Musculoskeletal: Normal range of motion and neck supple.      Thyroid: No thyromegaly.      Vascular: No JVD.      Trachea: No tracheal deviation.   Cardiovascular:      Rate and Rhythm: Normal rate and regular rhythm.      Heart sounds: Normal heart sounds. No murmur. No friction rub. No gallop.    Pulmonary:      Effort: Pulmonary effort is normal. No respiratory distress.      Breath sounds: Normal breath sounds.   Abdominal:      General: There is no distension.      Palpations: Abdomen is soft.      Tenderness: There is no abdominal tenderness.   Musculoskeletal:      Right lower leg: No edema.      Left lower leg: No edema.   Lymphadenopathy:      Cervical: No cervical adenopathy.   Skin:     General: Skin is warm and dry.      Findings: No lesion or rash.   Neurological:      Mental Status: He is alert. Mental status is at baseline.   Psychiatric:         Behavior: Behavior normal.           Assessment:       1. Tongue burning sensation    2. Esophageal hiatal hernia    3. Levoscoliosis    4. Anterolisthesis    5. Bilateral renal cysts    6. Gastroesophageal reflux disease with esophagitis without hemorrhage           Admission on 11/28/2020, Discharged on 11/28/2020   Component Date Value Ref Range Status    WBC 11/28/2020 7.69  3.90 - 12.70 K/uL Final    RBC 11/28/2020 4.31* 4.60 - 6.20 M/uL Final    Hemoglobin 11/28/2020 13.4* 14.0 - 18.0 g/dL Final    Hematocrit 11/28/2020 41.5  40.0 - 54.0 % Final    MCV 11/28/2020 96  82 - 98 fL Final    MCH 11/28/2020 31.1* 27.0 - 31.0 pg Final    MCHC 11/28/2020 32.3  32.0 - 36.0 g/dL Final    RDW 11/28/2020 12.7  11.5 -  14.5 % Final    Platelets 11/28/2020 363* 150 - 350 K/uL Final    MPV 11/28/2020 9.4  9.2 - 12.9 fL Final    Immature Granulocytes 11/28/2020 0.3  0.0 - 0.5 % Final    Gran # (ANC) 11/28/2020 6.1  1.8 - 7.7 K/uL Final    Immature Grans (Abs) 11/28/2020 0.02  0.00 - 0.04 K/uL Final    Lymph # 11/28/2020 1.0  1.0 - 4.8 K/uL Final    Mono # 11/28/2020 0.6  0.3 - 1.0 K/uL Final    Eos # 11/28/2020 0.0  0.0 - 0.5 K/uL Final    Baso # 11/28/2020 0.08  0.00 - 0.20 K/uL Final    nRBC 11/28/2020 0  0 /100 WBC Final    Gran % 11/28/2020 78.6* 38.0 - 73.0 % Final    Lymph % 11/28/2020 12.6* 18.0 - 48.0 % Final    Mono % 11/28/2020 7.2  4.0 - 15.0 % Final    Eosinophil % 11/28/2020 0.3  0.0 - 8.0 % Final    Basophil % 11/28/2020 1.0  0.0 - 1.9 % Final    Differential Method 11/28/2020 Automated   Final    Sodium 11/28/2020 137  136 - 145 mmol/L Final    Potassium 11/28/2020 4.9  3.5 - 5.1 mmol/L Final    Chloride 11/28/2020 104  95 - 110 mmol/L Final    CO2 11/28/2020 27  23 - 29 mmol/L Final    Glucose 11/28/2020 110  70 - 110 mg/dL Final    BUN 11/28/2020 19  8 - 23 mg/dL Final    Creatinine 11/28/2020 1.6* 0.5 - 1.4 mg/dL Final    Calcium 11/28/2020 9.1  8.7 - 10.5 mg/dL Final    Total Protein 11/28/2020 7.1  6.0 - 8.4 g/dL Final    Albumin 11/28/2020 4.0  3.5 - 5.2 g/dL Final    Total Bilirubin 11/28/2020 1.0  0.1 - 1.0 mg/dL Final    Alkaline Phosphatase 11/28/2020 61  55 - 135 U/L Final    AST 11/28/2020 23  10 - 40 U/L Final    ALT 11/28/2020 18  10 - 44 U/L Final    Anion Gap 11/28/2020 6* 8 - 16 mmol/L Final    eGFR if  11/28/2020 48.0* >60 mL/min/1.73 m^2 Final    eGFR if non African American 11/28/2020 41.5* >60 mL/min/1.73 m^2 Final    Specimen UA 11/28/2020 Urine, Clean Catch   Final    Color, UA 11/28/2020 Yellow  Yellow, Straw, Candace Final    Appearance, UA 11/28/2020 Clear  Clear Final    pH, UA 11/28/2020 7.0  5.0 - 8.0 Final    Specific Gravity, UA  11/28/2020 1.015  1.005 - 1.030 Final    Protein, UA 11/28/2020 Negative  Negative Final    Glucose, UA 11/28/2020 Negative  Negative Final    Ketones, UA 11/28/2020 Negative  Negative Final    Bilirubin (UA) 11/28/2020 Negative  Negative Final    Occult Blood UA 11/28/2020 Negative  Negative Final    Nitrite, UA 11/28/2020 Negative  Negative Final    Urobilinogen, UA 11/28/2020 Negative  Negative EU/dL Final    Leukocytes, UA 11/28/2020 Negative  Negative Final   Lab Visit on 10/14/2020   Component Date Value Ref Range Status    Cholesterol 10/14/2020 223* 120 - 199 mg/dL Final    Triglycerides 10/14/2020 107  30 - 150 mg/dL Final    HDL 10/14/2020 50  40 - 75 mg/dL Final    LDL Cholesterol 10/14/2020 151.6  63.0 - 159.0 mg/dL Final    HDL/Cholesterol Ratio 10/14/2020 22.4  20.0 - 50.0 % Final    Total Cholesterol/HDL Ratio 10/14/2020 4.5  2.0 - 5.0 Final    Non-HDL Cholesterol 10/14/2020 173  mg/dL Final    Sodium 10/14/2020 139  136 - 145 mmol/L Final    Potassium 10/14/2020 4.2  3.5 - 5.1 mmol/L Final    Chloride 10/14/2020 103  95 - 110 mmol/L Final    CO2 10/14/2020 24  23 - 29 mmol/L Final    Glucose 10/14/2020 93  70 - 110 mg/dL Final    BUN 10/14/2020 28* 8 - 23 mg/dL Final    Creatinine 10/14/2020 1.8* 0.5 - 1.4 mg/dL Final    Calcium 10/14/2020 9.3  8.7 - 10.5 mg/dL Final    Total Protein 10/14/2020 7.5  6.0 - 8.4 g/dL Final    Albumin 10/14/2020 4.2  3.5 - 5.2 g/dL Final    Total Bilirubin 10/14/2020 1.2* 0.1 - 1.0 mg/dL Final    Alkaline Phosphatase 10/14/2020 64  55 - 135 U/L Final    AST 10/14/2020 27  10 - 40 U/L Final    ALT 10/14/2020 21  10 - 44 U/L Final    Anion Gap 10/14/2020 12  8 - 16 mmol/L Final    eGFR if  10/14/2020 41.6* >60 mL/min/1.73 m^2 Final    eGFR if non  10/14/2020 36.0* >60 mL/min/1.73 m^2 Final    TSH 10/14/2020 0.460  0.340 - 5.600 uIU/mL Final         Plan:           Tongue burning sensation    Esophageal hiatal  hernia    Levoscoliosis    Anterolisthesis    Bilateral renal cysts    Gastroesophageal reflux disease with esophagitis without hemorrhage  -     pantoprazole (PROTONIX) 20 MG tablet; Take 1 tablet (20 mg total) by mouth once daily.  Dispense: 30 tablet; Refill: 2      Patient's recent visit to the emergency department has been noted.  The burning sensation is tongue could be very well due to some acid reflux from hiatal hernia which is moderate in size.  I have advised him to stop the coffee and tea for 1 week at least and see if it makes any difference in the burning in his tongue.    I may also give him some suggestion to take over-the-counter Prilosec 20 mg for a couple of weeks or so.  There is no need for cure or treatment for hiatal hernia unless until it becomes very bothersome for which then there is a surgery.    He did have back pain and I did review that he has some levoscoliosis and has lumbar spine with some anterolisthesis.  He did take 1 methocarbamol which is a muscle relaxer and after that he did not have to take any more.    He does continue with some back and stretch exercises and I have advised encouraged him to continue to do so.    He is updated on the pneumonia and influenza vaccination.  He is also updated on the shingles vaccine.  He is  already updated on the eye checkup and I would like to get a copy of that.    He will keep his regular follow-up on February 22nd.  Earlier on or perhaps at follow-up will consider an endoscopy.          Current Outpatient Medications:     levothyroxine (SYNTHROID) 100 MCG tablet, TAKE 1 TABLET(100 MCG) BY MOUTH BEFORE BREAKFAST, Disp: 90 tablet, Rfl: 2    losartan (COZAAR) 50 MG tablet, 1 EVERY EVENING, Disp: 90 tablet, Rfl: 2    methocarbamoL (ROBAXIN) 500 MG Tab, Take 2 tablets (1,000 mg total) by mouth 3 (three) times daily. for 5 days, Disp: 30 tablet, Rfl: 0    pantoprazole (PROTONIX) 20 MG tablet, Take 1 tablet (20 mg total) by mouth once daily.,  Disp: 30 tablet, Rfl: 2

## 2020-11-30 NOTE — PATIENT INSTRUCTIONS
What Is a Hiatal Hernia?    Hiatal hernia is when the area where the stomach and esophagus meet bulges up through the diaphragm into the chest cavity. In some cases, part of the stomach may bulge above the diaphragm. Stomach acid may move up into the esophagus and cause symptoms. The symptoms are often blamed on gastroesophageal reflux disease (GERD). You may only know about the hernia when it shows up on an X-ray taken for other reasons.   What you may feel  The hiatus is a normal hole in the diaphragm. The esophagus passes through this hole and leads to the stomach. In some cases, part of the stomach may bulge above the diaphragm. This bulge is called a hernia. Stomach acid may move up into the esophagus and cause symptoms.  When you eat, the muscle at the hiatus relaxes to allow food to pass into the stomach. It tightens again to keep food and digestive acids in the stomach.  Many people with hiatal hernias have mild symptoms. You may notice the following GERD symptoms:  · Heartburn or other chest discomfort  · A feeling of chest fullness after a meal  · Frequent burping  · Acid taste in the mouth  · Trouble swallowing  Treating symptoms  If you have been diagnosed with hiatal hernia, these suggestions may help improve symptoms:  · Lose excess weight. Extra weight puts pressure on the stomach and esophagus.  · Dont lie down after eating. Sit up for at least an hour after eating. Lying down after eating can increase symptoms.  · Avoid certain foods and drinks. These include fatty foods, chocolate, coffee, mint, and other foods that cause symptoms for you.  · Dont smoke or drink alcohol. These can worsen symptoms.  · Look at your medicines. Discuss your medicines with your healthcare provider. Many medicines can cause symptoms.  · Consider an antacid medicine. Ask your healthcare provider about over-the-counter and prescription medicines that may help.  · Ask about surgery, if needed. Surgery is a treatment  choice for some people. Your healthcare provider can determine if surgery is an option for you.    Date Last Reviewed: 10/1/2016  © 3510-7258 [employer]. [address] [place] [address], [place] [address]. All rights reserved. This information is not intended as a substitute for professional medical care. Always follow your healthcare professional's instructions.        Simple Kidney Cysts    Simple kidney cysts are fluid-filled sacs that form in your kidneys. These cysts usually dont affect how the kidneys function. Simple kidney cysts are very common. They rarely need treatment. Most people dont even know that they have them.  Understanding the kidneys  The kidneys are 2 bean-shaped organs located near the middle of your back. They filter large amounts of blood each day. They also help regulate the fluid and salts (electrolytes) in your blood. They release waste products through your urine. The kidneys have tiny tubules. These structures collect newly formed urine. Cysts may result when the tubules get blocked. Small sacs sometimes form on the tubules. These may detach and become simple kidney cysts.  What causes simple kidney cysts?  Researchers are still not sure what causes simple kidney cysts. You might have a single kidney cyst. Or you might have more than one. You might have them on only 1 kidney or on both of them. In most cases, a person has only 1 cyst. Over time the cyst may slowly increase in size.  Some health conditions can cause kidney cysts to grow. For example, a person with polycystic kidney disease develops a large number of kidney cysts. Too many cysts can keep the kidney from working properly.  Other health conditions that can cause simple kidney cysts include:  · Chronic kidney disease  · Dialysis for chronic kidney disease  · Medullary cystic kidney disease  · Autosomal dominant polycystic kidney disease  · Von Hippel-Lindau disease  · Tuberous sclerosis complex  If you smoke or have  high blood pressure, you may have a higher risk for a simple kidney cyst.  Symptoms of simple kidney cysts  Simple kidney cysts often dont cause symptoms. In rare cases, they may cause symptoms such as:  · Blood in your urine if the cyst bursts  · Pain in your upper belly or back if the cyst bursts  · Fever and chills if the cyst is infected  · High blood pressure if the cyst compresses the rest of the kidney  · Trouble urinating if the cyst blocks the tube that sends urine from the kidneys to the bladder (ureter)  A simple kidney cyst usually doesnt greatly impair kidney function unless it blocks the ureter. More commonly, a cyst may cause a slight drop in kidney function that doesnt cause any problems or symptoms.  Diagnosing simple kidney cysts  Simple kidney cysts are often first found with an imaging test that was done for another reason. Your health care provider will ask about your medical history and symptoms. Youll also be given a physical exam.  It is important to distinguish simple kidney cysts from complex cysts. Complex cysts are a different kind of cyst that may be cancer. A complex cyst needs to be removed. For this reason, you may need tests such as:  · Kidney ultrasound  · Kidney CT scan, if a detailed picture of the cyst is needed  · Kidney MRI, if more information is needed about the cyst  A radiologist will look at these pictures to see if your kidney cyst is simple or complex. A cyst may be rated with the Bosniak CT system. This has 5 categories based on how the cyst looks. If your cyst is a category 1, you likely wont need any more tests. A kidney cyst with a higher rating may need more tests or treatment. A category 5 cyst is most often linked with cancer.  Your health care provider will also check for other conditions that may be causing the cysts. You may need to have genetic testing. It can find other problems, such as polycystic kidney disease.  Treatment for simple kidney cysts  Many  people with simple kidney cysts dont need treatment. Your health care provider may want to keep track of the cyst over time. You may need ultrasound of the kidneys several times a year.  If you have symptoms, or if the cyst is blocking the flow of urine, you may need treatment such as:  · Over-the-counter pain medicine  · A procedure to puncture the cyst with a long needle inserted through the skin (sclerotherapy)  · Surgery to drain the cyst and remove its outer tissue  · Blood pressure medicine  · Antibiotics and drainage to treat a kidney cyst infection     When to call your health care provider  Call your health care provider right away if you have any of these:  · Blood in your urine  · Pain in your back  · Trouble urinating   Date Last Reviewed: 5/20/2015  © 9885-5818 [employer] [address] [place] [address], [place] [address]. All rights reserved. This information is not intended as a substitute for professional medical care. Always follow your healthcare professional's instructions.

## 2020-12-28 RX ORDER — LEVOTHYROXINE SODIUM 100 UG/1
100 TABLET ORAL
Qty: 90 TABLET | Refills: 2 | Status: SHIPPED | OUTPATIENT
Start: 2020-12-28 | End: 2021-09-21

## 2021-01-15 ENCOUNTER — IMMUNIZATION (OUTPATIENT)
Dept: FAMILY MEDICINE | Facility: CLINIC | Age: 76
End: 2021-01-15
Payer: COMMERCIAL

## 2021-01-15 DIAGNOSIS — Z23 NEED FOR VACCINATION: Primary | ICD-10-CM

## 2021-01-15 PROCEDURE — 0001A COVID-19, MRNA, LNP-S, PF, 30 MCG/0.3 ML DOSE VACCINE: ICD-10-PCS | Mod: CV19,,, | Performed by: FAMILY MEDICINE

## 2021-01-15 PROCEDURE — 91300 COVID-19, MRNA, LNP-S, PF, 30 MCG/0.3 ML DOSE VACCINE: ICD-10-PCS | Mod: ,,, | Performed by: FAMILY MEDICINE

## 2021-01-15 PROCEDURE — 0001A COVID-19, MRNA, LNP-S, PF, 30 MCG/0.3 ML DOSE VACCINE: CPT | Mod: CV19,,, | Performed by: FAMILY MEDICINE

## 2021-01-15 PROCEDURE — 91300 COVID-19, MRNA, LNP-S, PF, 30 MCG/0.3 ML DOSE VACCINE: CPT | Mod: ,,, | Performed by: FAMILY MEDICINE

## 2021-02-05 ENCOUNTER — IMMUNIZATION (OUTPATIENT)
Dept: FAMILY MEDICINE | Facility: CLINIC | Age: 76
End: 2021-02-05
Payer: COMMERCIAL

## 2021-02-05 DIAGNOSIS — Z23 NEED FOR VACCINATION: Primary | ICD-10-CM

## 2021-02-05 PROCEDURE — 0002A COVID-19, MRNA, LNP-S, PF, 30 MCG/0.3 ML DOSE VACCINE: CPT | Mod: CV19,,, | Performed by: FAMILY MEDICINE

## 2021-02-05 PROCEDURE — 0002A COVID-19, MRNA, LNP-S, PF, 30 MCG/0.3 ML DOSE VACCINE: ICD-10-PCS | Mod: CV19,,, | Performed by: FAMILY MEDICINE

## 2021-02-05 PROCEDURE — 91300 COVID-19, MRNA, LNP-S, PF, 30 MCG/0.3 ML DOSE VACCINE: CPT | Mod: ,,, | Performed by: FAMILY MEDICINE

## 2021-02-05 PROCEDURE — 91300 COVID-19, MRNA, LNP-S, PF, 30 MCG/0.3 ML DOSE VACCINE: ICD-10-PCS | Mod: ,,, | Performed by: FAMILY MEDICINE

## 2021-02-18 ENCOUNTER — LAB VISIT (OUTPATIENT)
Dept: LAB | Facility: HOSPITAL | Age: 76
End: 2021-02-18
Attending: INTERNAL MEDICINE
Payer: COMMERCIAL

## 2021-02-18 DIAGNOSIS — N18.30 STAGE 3 CHRONIC KIDNEY DISEASE, UNSPECIFIED WHETHER STAGE 3A OR 3B CKD: ICD-10-CM

## 2021-02-18 LAB
ALBUMIN SERPL BCP-MCNC: 3.8 G/DL (ref 3.5–5.2)
ANION GAP SERPL CALC-SCNC: 7 MMOL/L (ref 8–16)
BUN SERPL-MCNC: 30 MG/DL (ref 8–23)
CALCIUM SERPL-MCNC: 9 MG/DL (ref 8.7–10.5)
CHLORIDE SERPL-SCNC: 102 MMOL/L (ref 95–110)
CO2 SERPL-SCNC: 28 MMOL/L (ref 23–29)
CREAT SERPL-MCNC: 1.7 MG/DL (ref 0.5–1.4)
EST. GFR  (AFRICAN AMERICAN): 44.3 ML/MIN/1.73 M^2
EST. GFR  (NON AFRICAN AMERICAN): 38.3 ML/MIN/1.73 M^2
GLUCOSE SERPL-MCNC: 131 MG/DL (ref 70–110)
MAGNESIUM SERPL-MCNC: 2 MG/DL (ref 1.6–2.6)
PHOSPHATE SERPL-MCNC: 4 MG/DL (ref 2.7–4.5)
POTASSIUM SERPL-SCNC: 4.2 MMOL/L (ref 3.5–5.1)
SODIUM SERPL-SCNC: 137 MMOL/L (ref 136–145)

## 2021-02-18 PROCEDURE — 83735 ASSAY OF MAGNESIUM: CPT

## 2021-02-18 PROCEDURE — 36415 COLL VENOUS BLD VENIPUNCTURE: CPT

## 2021-02-18 PROCEDURE — 80069 RENAL FUNCTION PANEL: CPT

## 2021-02-22 ENCOUNTER — OFFICE VISIT (OUTPATIENT)
Dept: FAMILY MEDICINE | Facility: CLINIC | Age: 76
End: 2021-02-22
Payer: COMMERCIAL

## 2021-02-22 VITALS
DIASTOLIC BLOOD PRESSURE: 71 MMHG | WEIGHT: 157 LBS | HEART RATE: 86 BPM | HEIGHT: 69 IN | SYSTOLIC BLOOD PRESSURE: 148 MMHG | TEMPERATURE: 97 F | BODY MASS INDEX: 23.25 KG/M2

## 2021-02-22 DIAGNOSIS — R14.2 BELCHING SYMPTOM: ICD-10-CM

## 2021-02-22 DIAGNOSIS — N18.32 STAGE 3B CHRONIC KIDNEY DISEASE: ICD-10-CM

## 2021-02-22 DIAGNOSIS — K14.6 BURNING TONGUE: ICD-10-CM

## 2021-02-22 DIAGNOSIS — E03.9 ACQUIRED HYPOTHYROIDISM: ICD-10-CM

## 2021-02-22 DIAGNOSIS — I10 BENIGN ESSENTIAL HYPERTENSION: Primary | ICD-10-CM

## 2021-02-22 DIAGNOSIS — I44.7 LEFT BUNDLE BRANCH BLOCK: ICD-10-CM

## 2021-02-22 DIAGNOSIS — E78.00 HYPERCHOLESTEROLEMIA: ICD-10-CM

## 2021-02-22 PROCEDURE — 1159F MED LIST DOCD IN RCRD: CPT | Mod: S$GLB,,, | Performed by: INTERNAL MEDICINE

## 2021-02-22 PROCEDURE — 3078F DIAST BP <80 MM HG: CPT | Mod: S$GLB,,, | Performed by: INTERNAL MEDICINE

## 2021-02-22 PROCEDURE — 3077F PR MOST RECENT SYSTOLIC BLOOD PRESSURE >= 140 MM HG: ICD-10-PCS | Mod: S$GLB,,, | Performed by: INTERNAL MEDICINE

## 2021-02-22 PROCEDURE — 1126F AMNT PAIN NOTED NONE PRSNT: CPT | Mod: S$GLB,,, | Performed by: INTERNAL MEDICINE

## 2021-02-22 PROCEDURE — 1170F FXNL STATUS ASSESSED: CPT | Mod: S$GLB,,, | Performed by: INTERNAL MEDICINE

## 2021-02-22 PROCEDURE — 1126F PR PAIN SEVERITY QUANTIFIED, NO PAIN PRESENT: ICD-10-PCS | Mod: S$GLB,,, | Performed by: INTERNAL MEDICINE

## 2021-02-22 PROCEDURE — 99213 OFFICE O/P EST LOW 20 MIN: CPT | Mod: S$GLB,,, | Performed by: INTERNAL MEDICINE

## 2021-02-22 PROCEDURE — 1101F PT FALLS ASSESS-DOCD LE1/YR: CPT | Mod: S$GLB,,, | Performed by: INTERNAL MEDICINE

## 2021-02-22 PROCEDURE — 3077F SYST BP >= 140 MM HG: CPT | Mod: S$GLB,,, | Performed by: INTERNAL MEDICINE

## 2021-02-22 PROCEDURE — 1101F PR PT FALLS ASSESS DOC 0-1 FALLS W/OUT INJ PAST YR: ICD-10-PCS | Mod: S$GLB,,, | Performed by: INTERNAL MEDICINE

## 2021-02-22 PROCEDURE — 3078F PR MOST RECENT DIASTOLIC BLOOD PRESSURE < 80 MM HG: ICD-10-PCS | Mod: S$GLB,,, | Performed by: INTERNAL MEDICINE

## 2021-02-22 PROCEDURE — 3288F PR FALLS RISK ASSESSMENT DOCUMENTED: ICD-10-PCS | Mod: S$GLB,,, | Performed by: INTERNAL MEDICINE

## 2021-02-22 PROCEDURE — 1159F PR MEDICATION LIST DOCUMENTED IN MEDICAL RECORD: ICD-10-PCS | Mod: S$GLB,,, | Performed by: INTERNAL MEDICINE

## 2021-02-22 PROCEDURE — 99213 PR OFFICE/OUTPT VISIT, EST, LEVL III, 20-29 MIN: ICD-10-PCS | Mod: S$GLB,,, | Performed by: INTERNAL MEDICINE

## 2021-02-22 PROCEDURE — 3288F FALL RISK ASSESSMENT DOCD: CPT | Mod: S$GLB,,, | Performed by: INTERNAL MEDICINE

## 2021-02-22 PROCEDURE — 1170F PR FUNCTIONAL STATUS ASSESSED: ICD-10-PCS | Mod: S$GLB,,, | Performed by: INTERNAL MEDICINE

## 2021-03-01 DIAGNOSIS — K21.00 GASTROESOPHAGEAL REFLUX DISEASE WITH ESOPHAGITIS WITHOUT HEMORRHAGE: ICD-10-CM

## 2021-03-01 RX ORDER — PANTOPRAZOLE SODIUM 20 MG/1
20 TABLET, DELAYED RELEASE ORAL DAILY
Qty: 30 TABLET | Refills: 2 | Status: SHIPPED | OUTPATIENT
Start: 2021-03-01 | End: 2021-06-23

## 2021-03-28 PROBLEM — Z98.890 HISTORY OF ENDOSCOPY: Status: ACTIVE | Noted: 2021-03-22

## 2021-06-23 ENCOUNTER — OFFICE VISIT (OUTPATIENT)
Dept: FAMILY MEDICINE | Facility: CLINIC | Age: 76
End: 2021-06-23
Payer: COMMERCIAL

## 2021-06-23 VITALS
BODY MASS INDEX: 22.36 KG/M2 | DIASTOLIC BLOOD PRESSURE: 69 MMHG | HEIGHT: 69 IN | WEIGHT: 151 LBS | SYSTOLIC BLOOD PRESSURE: 136 MMHG | HEART RATE: 71 BPM

## 2021-06-23 DIAGNOSIS — E03.9 ACQUIRED HYPOTHYROIDISM: Chronic | ICD-10-CM

## 2021-06-23 DIAGNOSIS — N18.32 STAGE 3B CHRONIC KIDNEY DISEASE: Chronic | ICD-10-CM

## 2021-06-23 DIAGNOSIS — R14.2 BELCHING SYMPTOM: Chronic | ICD-10-CM

## 2021-06-23 DIAGNOSIS — D64.9 NORMOCYTIC ANEMIA: ICD-10-CM

## 2021-06-23 DIAGNOSIS — K14.6 TONGUE BURNING SENSATION: Chronic | ICD-10-CM

## 2021-06-23 DIAGNOSIS — I10 BENIGN ESSENTIAL HYPERTENSION: Primary | Chronic | ICD-10-CM

## 2021-06-23 PROCEDURE — 1126F AMNT PAIN NOTED NONE PRSNT: CPT | Mod: S$GLB,,, | Performed by: INTERNAL MEDICINE

## 2021-06-23 PROCEDURE — 99214 OFFICE O/P EST MOD 30 MIN: CPT | Mod: S$GLB,,, | Performed by: INTERNAL MEDICINE

## 2021-06-23 PROCEDURE — 3075F SYST BP GE 130 - 139MM HG: CPT | Mod: S$GLB,,, | Performed by: INTERNAL MEDICINE

## 2021-06-23 PROCEDURE — 3078F PR MOST RECENT DIASTOLIC BLOOD PRESSURE < 80 MM HG: ICD-10-PCS | Mod: S$GLB,,, | Performed by: INTERNAL MEDICINE

## 2021-06-23 PROCEDURE — 99214 PR OFFICE/OUTPT VISIT, EST, LEVL IV, 30-39 MIN: ICD-10-PCS | Mod: S$GLB,,, | Performed by: INTERNAL MEDICINE

## 2021-06-23 PROCEDURE — 3075F PR MOST RECENT SYSTOLIC BLOOD PRESS GE 130-139MM HG: ICD-10-PCS | Mod: S$GLB,,, | Performed by: INTERNAL MEDICINE

## 2021-06-23 PROCEDURE — 1159F PR MEDICATION LIST DOCUMENTED IN MEDICAL RECORD: ICD-10-PCS | Mod: S$GLB,,, | Performed by: INTERNAL MEDICINE

## 2021-06-23 PROCEDURE — 3288F PR FALLS RISK ASSESSMENT DOCUMENTED: ICD-10-PCS | Mod: S$GLB,,, | Performed by: INTERNAL MEDICINE

## 2021-06-23 PROCEDURE — 1159F MED LIST DOCD IN RCRD: CPT | Mod: S$GLB,,, | Performed by: INTERNAL MEDICINE

## 2021-06-23 PROCEDURE — 1101F PT FALLS ASSESS-DOCD LE1/YR: CPT | Mod: S$GLB,,, | Performed by: INTERNAL MEDICINE

## 2021-06-23 PROCEDURE — 1126F PR PAIN SEVERITY QUANTIFIED, NO PAIN PRESENT: ICD-10-PCS | Mod: S$GLB,,, | Performed by: INTERNAL MEDICINE

## 2021-06-23 PROCEDURE — 1101F PR PT FALLS ASSESS DOC 0-1 FALLS W/OUT INJ PAST YR: ICD-10-PCS | Mod: S$GLB,,, | Performed by: INTERNAL MEDICINE

## 2021-06-23 PROCEDURE — 3288F FALL RISK ASSESSMENT DOCD: CPT | Mod: S$GLB,,, | Performed by: INTERNAL MEDICINE

## 2021-06-23 PROCEDURE — 3078F DIAST BP <80 MM HG: CPT | Mod: S$GLB,,, | Performed by: INTERNAL MEDICINE

## 2021-06-23 RX ORDER — PANTOPRAZOLE SODIUM 40 MG/1
40 TABLET, DELAYED RELEASE ORAL DAILY
COMMUNITY
End: 2024-04-01

## 2021-06-24 RX ORDER — LOSARTAN POTASSIUM 50 MG/1
TABLET ORAL
Qty: 90 TABLET | Refills: 2 | Status: SHIPPED | OUTPATIENT
Start: 2021-06-24 | End: 2022-03-28 | Stop reason: SDUPTHER

## 2021-12-10 ENCOUNTER — LAB VISIT (OUTPATIENT)
Dept: LAB | Facility: HOSPITAL | Age: 76
End: 2021-12-10
Attending: INTERNAL MEDICINE
Payer: COMMERCIAL

## 2021-12-10 DIAGNOSIS — N18.32 STAGE 3B CHRONIC KIDNEY DISEASE: Chronic | ICD-10-CM

## 2021-12-10 DIAGNOSIS — E03.9 ACQUIRED HYPOTHYROIDISM: Chronic | ICD-10-CM

## 2021-12-10 DIAGNOSIS — D64.9 NORMOCYTIC ANEMIA: ICD-10-CM

## 2021-12-10 DIAGNOSIS — I10 BENIGN ESSENTIAL HYPERTENSION: Chronic | ICD-10-CM

## 2021-12-10 LAB
ANION GAP SERPL CALC-SCNC: 10 MMOL/L (ref 8–16)
BASOPHILS # BLD AUTO: 0.11 K/UL (ref 0–0.2)
BASOPHILS NFR BLD: 1.3 % (ref 0–1.9)
BUN SERPL-MCNC: 26 MG/DL (ref 8–23)
CALCIUM SERPL-MCNC: 9 MG/DL (ref 8.7–10.5)
CHLORIDE SERPL-SCNC: 102 MMOL/L (ref 95–110)
CO2 SERPL-SCNC: 24 MMOL/L (ref 23–29)
CREAT SERPL-MCNC: 1.7 MG/DL (ref 0.5–1.4)
DIFFERENTIAL METHOD: ABNORMAL
EOSINOPHIL # BLD AUTO: 0.3 K/UL (ref 0–0.5)
EOSINOPHIL NFR BLD: 3.5 % (ref 0–8)
ERYTHROCYTE [DISTWIDTH] IN BLOOD BY AUTOMATED COUNT: 12.8 % (ref 11.5–14.5)
EST. GFR  (AFRICAN AMERICAN): 44.3 ML/MIN/1.73 M^2
EST. GFR  (NON AFRICAN AMERICAN): 38.3 ML/MIN/1.73 M^2
FERRITIN SERPL-MCNC: 35 NG/ML (ref 20–300)
GLUCOSE SERPL-MCNC: 96 MG/DL (ref 70–110)
HCT VFR BLD AUTO: 39.6 % (ref 40–54)
HGB BLD-MCNC: 12.9 G/DL (ref 14–18)
IMM GRANULOCYTES # BLD AUTO: 0.03 K/UL (ref 0–0.04)
IMM GRANULOCYTES NFR BLD AUTO: 0.4 % (ref 0–0.5)
IRON SERPL-MCNC: 119 UG/DL (ref 45–160)
LYMPHOCYTES # BLD AUTO: 2 K/UL (ref 1–4.8)
LYMPHOCYTES NFR BLD: 24.7 % (ref 18–48)
MCH RBC QN AUTO: 31.3 PG (ref 27–31)
MCHC RBC AUTO-ENTMCNC: 32.6 G/DL (ref 32–36)
MCV RBC AUTO: 96 FL (ref 82–98)
MONOCYTES # BLD AUTO: 1 K/UL (ref 0.3–1)
MONOCYTES NFR BLD: 12.2 % (ref 4–15)
NEUTROPHILS # BLD AUTO: 4.8 K/UL (ref 1.8–7.7)
NEUTROPHILS NFR BLD: 57.9 % (ref 38–73)
NRBC BLD-RTO: 0 /100 WBC
PHOSPHATE SERPL-MCNC: 3.6 MG/DL (ref 2.7–4.5)
PLATELET # BLD AUTO: 346 K/UL (ref 150–450)
PMV BLD AUTO: 9.4 FL (ref 9.2–12.9)
POTASSIUM SERPL-SCNC: 4.2 MMOL/L (ref 3.5–5.1)
RBC # BLD AUTO: 4.12 M/UL (ref 4.6–6.2)
SATURATED IRON: 42 % (ref 20–50)
SODIUM SERPL-SCNC: 136 MMOL/L (ref 136–145)
TOTAL IRON BINDING CAPACITY: 281 UG/DL (ref 250–450)
TRANSFERRIN SERPL-MCNC: 201 MG/DL (ref 200–375)
TSH SERPL DL<=0.005 MIU/L-ACNC: 0.58 UIU/ML (ref 0.34–5.6)
WBC # BLD AUTO: 8.23 K/UL (ref 3.9–12.7)

## 2021-12-10 PROCEDURE — 36415 COLL VENOUS BLD VENIPUNCTURE: CPT | Performed by: INTERNAL MEDICINE

## 2021-12-10 PROCEDURE — 84100 ASSAY OF PHOSPHORUS: CPT | Performed by: INTERNAL MEDICINE

## 2021-12-10 PROCEDURE — 80048 BASIC METABOLIC PNL TOTAL CA: CPT | Performed by: INTERNAL MEDICINE

## 2021-12-10 PROCEDURE — 84466 ASSAY OF TRANSFERRIN: CPT | Performed by: INTERNAL MEDICINE

## 2021-12-10 PROCEDURE — 82728 ASSAY OF FERRITIN: CPT | Performed by: INTERNAL MEDICINE

## 2021-12-10 PROCEDURE — 85025 COMPLETE CBC W/AUTO DIFF WBC: CPT | Performed by: INTERNAL MEDICINE

## 2021-12-10 PROCEDURE — 84443 ASSAY THYROID STIM HORMONE: CPT | Performed by: INTERNAL MEDICINE

## 2021-12-15 ENCOUNTER — OFFICE VISIT (OUTPATIENT)
Dept: FAMILY MEDICINE | Facility: CLINIC | Age: 76
End: 2021-12-15
Payer: COMMERCIAL

## 2021-12-15 VITALS
DIASTOLIC BLOOD PRESSURE: 66 MMHG | BODY MASS INDEX: 22.36 KG/M2 | WEIGHT: 151 LBS | SYSTOLIC BLOOD PRESSURE: 132 MMHG | HEIGHT: 69 IN | HEART RATE: 78 BPM

## 2021-12-15 DIAGNOSIS — M41.86 LEVOSCOLIOSIS OF LUMBAR SPINE: ICD-10-CM

## 2021-12-15 DIAGNOSIS — G89.29 CHRONIC MIDLINE LOW BACK PAIN WITHOUT SCIATICA: ICD-10-CM

## 2021-12-15 DIAGNOSIS — M54.50 CHRONIC MIDLINE LOW BACK PAIN WITHOUT SCIATICA: ICD-10-CM

## 2021-12-15 DIAGNOSIS — D64.9 NORMOCYTIC ANEMIA: ICD-10-CM

## 2021-12-15 DIAGNOSIS — K21.00 GASTROESOPHAGEAL REFLUX DISEASE WITH ESOPHAGITIS WITHOUT HEMORRHAGE: ICD-10-CM

## 2021-12-15 DIAGNOSIS — I10 BENIGN ESSENTIAL HYPERTENSION: Primary | Chronic | ICD-10-CM

## 2021-12-15 DIAGNOSIS — E03.9 ACQUIRED HYPOTHYROIDISM: Chronic | ICD-10-CM

## 2021-12-15 DIAGNOSIS — M43.16 ANTEROLISTHESIS OF LUMBAR SPINE: ICD-10-CM

## 2021-12-15 DIAGNOSIS — N18.32 STAGE 3B CHRONIC KIDNEY DISEASE: ICD-10-CM

## 2021-12-15 PROCEDURE — 99214 OFFICE O/P EST MOD 30 MIN: CPT | Mod: S$GLB,,, | Performed by: INTERNAL MEDICINE

## 2021-12-15 PROCEDURE — 99214 PR OFFICE/OUTPT VISIT, EST, LEVL IV, 30-39 MIN: ICD-10-PCS | Mod: S$GLB,,, | Performed by: INTERNAL MEDICINE

## 2022-06-13 ENCOUNTER — LAB VISIT (OUTPATIENT)
Dept: LAB | Facility: HOSPITAL | Age: 77
End: 2022-06-13
Attending: INTERNAL MEDICINE
Payer: COMMERCIAL

## 2022-06-13 DIAGNOSIS — D64.9 NORMOCYTIC ANEMIA: ICD-10-CM

## 2022-06-13 DIAGNOSIS — N18.32 STAGE 3B CHRONIC KIDNEY DISEASE: ICD-10-CM

## 2022-06-13 LAB
ALBUMIN SERPL BCP-MCNC: 4 G/DL (ref 3.5–5.2)
ANION GAP SERPL CALC-SCNC: 7 MMOL/L (ref 8–16)
BASOPHILS # BLD AUTO: 0.06 K/UL (ref 0–0.2)
BASOPHILS NFR BLD: 0.7 % (ref 0–1.9)
BUN SERPL-MCNC: 36 MG/DL (ref 8–23)
CALCIUM SERPL-MCNC: 8.9 MG/DL (ref 8.7–10.5)
CHLORIDE SERPL-SCNC: 102 MMOL/L (ref 95–110)
CO2 SERPL-SCNC: 26 MMOL/L (ref 23–29)
CREAT SERPL-MCNC: 1.8 MG/DL (ref 0.5–1.4)
DIFFERENTIAL METHOD: ABNORMAL
EOSINOPHIL # BLD AUTO: 0.2 K/UL (ref 0–0.5)
EOSINOPHIL NFR BLD: 2.5 % (ref 0–8)
ERYTHROCYTE [DISTWIDTH] IN BLOOD BY AUTOMATED COUNT: 12.7 % (ref 11.5–14.5)
EST. GFR  (AFRICAN AMERICAN): 41.1 ML/MIN/1.73 M^2
EST. GFR  (NON AFRICAN AMERICAN): 35.5 ML/MIN/1.73 M^2
GLUCOSE SERPL-MCNC: 101 MG/DL (ref 70–110)
HCT VFR BLD AUTO: 39.9 % (ref 40–54)
HGB BLD-MCNC: 13.2 G/DL (ref 14–18)
IMM GRANULOCYTES # BLD AUTO: 0.02 K/UL (ref 0–0.04)
IMM GRANULOCYTES NFR BLD AUTO: 0.2 % (ref 0–0.5)
LYMPHOCYTES # BLD AUTO: 1.7 K/UL (ref 1–4.8)
LYMPHOCYTES NFR BLD: 20.8 % (ref 18–48)
MCH RBC QN AUTO: 32 PG (ref 27–31)
MCHC RBC AUTO-ENTMCNC: 33.1 G/DL (ref 32–36)
MCV RBC AUTO: 97 FL (ref 82–98)
MONOCYTES # BLD AUTO: 1 K/UL (ref 0.3–1)
MONOCYTES NFR BLD: 11.9 % (ref 4–15)
NEUTROPHILS # BLD AUTO: 5.2 K/UL (ref 1.8–7.7)
NEUTROPHILS NFR BLD: 63.9 % (ref 38–73)
NRBC BLD-RTO: 0 /100 WBC
PHOSPHATE SERPL-MCNC: 3.3 MG/DL (ref 2.7–4.5)
PLATELET # BLD AUTO: 349 K/UL (ref 150–450)
PMV BLD AUTO: 9.4 FL (ref 9.2–12.9)
POTASSIUM SERPL-SCNC: 4.3 MMOL/L (ref 3.5–5.1)
PTH-INTACT SERPL-MCNC: 50 PG/ML (ref 9–77)
RBC # BLD AUTO: 4.12 M/UL (ref 4.6–6.2)
SODIUM SERPL-SCNC: 135 MMOL/L (ref 136–145)
WBC # BLD AUTO: 8.13 K/UL (ref 3.9–12.7)

## 2022-06-13 PROCEDURE — 80069 RENAL FUNCTION PANEL: CPT | Performed by: INTERNAL MEDICINE

## 2022-06-13 PROCEDURE — 83970 ASSAY OF PARATHORMONE: CPT | Performed by: INTERNAL MEDICINE

## 2022-06-13 PROCEDURE — 36415 COLL VENOUS BLD VENIPUNCTURE: CPT | Performed by: INTERNAL MEDICINE

## 2022-06-13 PROCEDURE — 85025 COMPLETE CBC W/AUTO DIFF WBC: CPT | Performed by: INTERNAL MEDICINE

## 2022-06-15 ENCOUNTER — OFFICE VISIT (OUTPATIENT)
Dept: FAMILY MEDICINE | Facility: CLINIC | Age: 77
End: 2022-06-15
Payer: COMMERCIAL

## 2022-06-15 VITALS
BODY MASS INDEX: 22.07 KG/M2 | HEIGHT: 69 IN | SYSTOLIC BLOOD PRESSURE: 129 MMHG | HEART RATE: 74 BPM | DIASTOLIC BLOOD PRESSURE: 69 MMHG | WEIGHT: 149 LBS

## 2022-06-15 DIAGNOSIS — D64.9 NORMOCYTIC NORMOCHROMIC ANEMIA: ICD-10-CM

## 2022-06-15 DIAGNOSIS — G89.29 CHRONIC MIDLINE LOW BACK PAIN WITHOUT SCIATICA: ICD-10-CM

## 2022-06-15 DIAGNOSIS — E03.9 ACQUIRED HYPOTHYROIDISM: ICD-10-CM

## 2022-06-15 DIAGNOSIS — I10 BENIGN ESSENTIAL HYPERTENSION: Primary | ICD-10-CM

## 2022-06-15 DIAGNOSIS — R79.89 LOW SERUM SODIUM: ICD-10-CM

## 2022-06-15 DIAGNOSIS — R14.2 BELCHING SYMPTOM: ICD-10-CM

## 2022-06-15 DIAGNOSIS — K21.00 GASTROESOPHAGEAL REFLUX DISEASE WITH ESOPHAGITIS WITHOUT HEMORRHAGE: ICD-10-CM

## 2022-06-15 DIAGNOSIS — M54.50 CHRONIC MIDLINE LOW BACK PAIN WITHOUT SCIATICA: ICD-10-CM

## 2022-06-15 DIAGNOSIS — N18.32 STAGE 3B CHRONIC KIDNEY DISEASE: ICD-10-CM

## 2022-06-15 PROCEDURE — 3078F PR MOST RECENT DIASTOLIC BLOOD PRESSURE < 80 MM HG: ICD-10-PCS | Mod: CPTII,S$GLB,, | Performed by: INTERNAL MEDICINE

## 2022-06-15 PROCEDURE — 1101F PT FALLS ASSESS-DOCD LE1/YR: CPT | Mod: CPTII,S$GLB,, | Performed by: INTERNAL MEDICINE

## 2022-06-15 PROCEDURE — 99214 PR OFFICE/OUTPT VISIT, EST, LEVL IV, 30-39 MIN: ICD-10-PCS | Mod: S$GLB,,, | Performed by: INTERNAL MEDICINE

## 2022-06-15 PROCEDURE — 3288F PR FALLS RISK ASSESSMENT DOCUMENTED: ICD-10-PCS | Mod: CPTII,S$GLB,, | Performed by: INTERNAL MEDICINE

## 2022-06-15 PROCEDURE — 1159F PR MEDICATION LIST DOCUMENTED IN MEDICAL RECORD: ICD-10-PCS | Mod: CPTII,S$GLB,, | Performed by: INTERNAL MEDICINE

## 2022-06-15 PROCEDURE — 1101F PR PT FALLS ASSESS DOC 0-1 FALLS W/OUT INJ PAST YR: ICD-10-PCS | Mod: CPTII,S$GLB,, | Performed by: INTERNAL MEDICINE

## 2022-06-15 PROCEDURE — 3288F FALL RISK ASSESSMENT DOCD: CPT | Mod: CPTII,S$GLB,, | Performed by: INTERNAL MEDICINE

## 2022-06-15 PROCEDURE — 1159F MED LIST DOCD IN RCRD: CPT | Mod: CPTII,S$GLB,, | Performed by: INTERNAL MEDICINE

## 2022-06-15 PROCEDURE — 3074F PR MOST RECENT SYSTOLIC BLOOD PRESSURE < 130 MM HG: ICD-10-PCS | Mod: CPTII,S$GLB,, | Performed by: INTERNAL MEDICINE

## 2022-06-15 PROCEDURE — 1126F PR PAIN SEVERITY QUANTIFIED, NO PAIN PRESENT: ICD-10-PCS | Mod: CPTII,S$GLB,, | Performed by: INTERNAL MEDICINE

## 2022-06-15 PROCEDURE — 3074F SYST BP LT 130 MM HG: CPT | Mod: CPTII,S$GLB,, | Performed by: INTERNAL MEDICINE

## 2022-06-15 PROCEDURE — 1160F RVW MEDS BY RX/DR IN RCRD: CPT | Mod: CPTII,S$GLB,, | Performed by: INTERNAL MEDICINE

## 2022-06-15 PROCEDURE — 99214 OFFICE O/P EST MOD 30 MIN: CPT | Mod: S$GLB,,, | Performed by: INTERNAL MEDICINE

## 2022-06-15 PROCEDURE — 1160F PR REVIEW ALL MEDS BY PRESCRIBER/CLIN PHARMACIST DOCUMENTED: ICD-10-PCS | Mod: CPTII,S$GLB,, | Performed by: INTERNAL MEDICINE

## 2022-06-15 PROCEDURE — 1126F AMNT PAIN NOTED NONE PRSNT: CPT | Mod: CPTII,S$GLB,, | Performed by: INTERNAL MEDICINE

## 2022-06-15 PROCEDURE — 3078F DIAST BP <80 MM HG: CPT | Mod: CPTII,S$GLB,, | Performed by: INTERNAL MEDICINE

## 2022-06-15 NOTE — PROGRESS NOTES
Subjective:       Patient ID: Jaswinder Reina is a 77 y.o. male.    Chief Complaint: Hypertension, Thyroid Problem, Gastroesophageal Reflux, Back Pain, and Chronic Kidney Disease    Patient is a 77-year-old gentleman who comes for follow-up.  Underlying medical issues are as below-    1.-hypertension  2.-gastroesophageal reflux  3.-hypothyroidism  4.-chronic kidney disease  5.-chronic low back pain with finding of scoliosis in the lumbar spine.    Last visit we had discussed these issues including stable creatinine numbers at 1.7 and to remain hydrated and avoid any NSAIDs and unnecessary antibiotics.    Low back pain also was reviewed and prior imaging procedures which were incidentally done for abdomen were reviewed which had indicated some anterolisthesis of vertebra and degenerative changes.  Had shown him some exercises to be done at home and pending no resolution consideration was given for PMR/physical therapy/chiropractor before considering referral to surgery.    Today he states that    Thyroid Problem  Presents for follow-up visit. Symptoms include cold intolerance (better now) and tremors. Patient reports no anxiety, constipation, diaphoresis, diarrhea, fatigue, heat intolerance or palpitations. The symptoms have been stable.   Hypertension  This is a chronic problem. The current episode started more than 1 year ago. The problem is unchanged. The problem is controlled. Pertinent negatives include no chest pain, headaches, neck pain, palpitations or shortness of breath. Risk factors for coronary artery disease include male gender. Past treatments include angiotensin blockers. The current treatment provides moderate improvement. Identifiable causes of hypertension include chronic renal disease and a thyroid problem.   Gastroesophageal Reflux  He complains of heartburn. He reports no abdominal pain, no chest pain, no choking or no wheezing. Sore throat: Burning sensation at the back of tongue. This is a  recurrent problem. The current episode started more than 1 year ago. The problem has been waxing and waning. Pertinent negatives include no fatigue. He has tried a PPI for the symptoms. The treatment provided moderate relief.   Back Pain  This is a chronic problem. The current episode started more than 1 month ago. The problem occurs constantly. The problem has been waxing and waning since onset. The quality of the pain is described as aching and cramping. Pertinent negatives include no abdominal pain, chest pain, headaches or weakness.       Past Medical History:   Diagnosis Date    GERD (gastroesophageal reflux disease)     History of colonoscopy 2013    The entire colon was normal. Patient was recommended surveillance colonoscopy in 5 years.    History of endoscopy 3/22/2021    Upper GI endoscopy on 2021 by Dr. Ozzie Chan.  Examined esophagus was normal.  Biopsies were taken.  A medium-size hiatal hernia was present.  Stomach was normal.  The examined duodenum was normal.  Await pathology results.  Use Protonix 40 mg b.i.d..    Hyperlipidemia     Hypertension     Hypothyroidism      Social History     Socioeconomic History    Marital status: Single    Number of children: 0   Occupational History    Occupation:  [employer] retd   Tobacco Use    Smoking status: Former Smoker     Packs/day: 2.00     Types: Cigarettes     Start date: 1962     Quit date: 1973     Years since quittin.4    Smokeless tobacco: Never Used   Substance and Sexual Activity    Alcohol use: Yes     Alcohol/week: 1.0 - 2.0 standard drink     Types: 1 - 2 Glasses of wine per week     Comment: occ    Drug use: No    Sexual activity: Not Currently     Partners: Female     Comment: Not sexually active     Social Determinants of Health     Financial Resource Strain: Low Risk     Difficulty of Paying Living Expenses: Not hard at all   Food Insecurity: No Food Insecurity    Worried About Running  Out of Food in the Last Year: Never true    Ran Out of Food in the Last Year: Never true   Transportation Needs: No Transportation Needs    Lack of Transportation (Medical): No    Lack of Transportation (Non-Medical): No   Physical Activity: Sufficiently Active    Days of Exercise per Week: 4 days    Minutes of Exercise per Session: 40 min   Stress: No Stress Concern Present    Feeling of Stress : Not at all   Social Connections: Socially Isolated    Frequency of Communication with Friends and Family: Once a week    Frequency of Social Gatherings with Friends and Family: Three times a week    Attends Congregation Services: Never    Active Member of Clubs or Organizations: No    Attends Club or Organization Meetings: Patient refused    Marital Status: Never    Housing Stability: Low Risk     Unable to Pay for Housing in the Last Year: No    Number of Places Lived in the Last Year: 1    Unstable Housing in the Last Year: No     Past Surgical History:   Procedure Laterality Date    ADENOIDECTOMY      COLONOSCOPY      TONSILLECTOMY       Family History   Problem Relation Age of Onset    Pneumonia Mother     COPD Mother         smoker    Hypertension Father     Suicide Father        Review of Systems   Constitutional: Negative for activity change, diaphoresis and fatigue.   HENT: Negative for hearing loss, rhinorrhea and trouble swallowing. Sore throat: Burning sensation at the back of tongue.    Eyes: Negative for discharge.   Respiratory: Negative for choking, chest tightness, shortness of breath and wheezing.    Cardiovascular: Negative for chest pain and palpitations.   Gastrointestinal: Positive for heartburn. Negative for abdominal pain, blood in stool, constipation, diarrhea and vomiting.   Endocrine: Positive for cold intolerance (better now). Negative for heat intolerance, polydipsia and polyuria.   Genitourinary: Negative for difficulty urinating, hematuria and urgency.  "  Musculoskeletal: Positive for back pain. Negative for arthralgias, joint swelling and neck pain.   Neurological: Positive for tremors. Negative for weakness and headaches.   Psychiatric/Behavioral: Negative for confusion and dysphoric mood. The patient is not nervous/anxious.          Objective:      Blood pressure (!) 142/62, pulse 74, height 5' 9" (1.753 m), weight 67.6 kg (149 lb). Body mass index is 22 kg/m².  Physical Exam  Vitals and nursing note reviewed.   Constitutional:       General: He is not in acute distress.     Appearance: Normal appearance. He is well-developed. He is not ill-appearing, toxic-appearing or diaphoretic.      Comments: BMI is 22.00   HENT:      Head: Normocephalic and atraumatic.      Mouth/Throat:      Palate: No mass.   Eyes:      General: No scleral icterus.     Conjunctiva/sclera: Conjunctivae normal.   Neck:      Thyroid: No thyromegaly.      Vascular: No JVD.      Trachea: No tracheal deviation.   Cardiovascular:      Rate and Rhythm: Normal rate and regular rhythm.      Heart sounds: Normal heart sounds. No murmur heard.    No friction rub. No gallop.   Pulmonary:      Effort: Pulmonary effort is normal. No respiratory distress.      Breath sounds: Normal breath sounds.   Abdominal:      General: There is no distension.      Palpations: Abdomen is soft.      Tenderness: There is no abdominal tenderness.   Musculoskeletal:      Cervical back: Neck supple.      Right lower leg: No edema.      Left lower leg: No edema.      Comments: Back does not show any significant issues at this point.   Lymphadenopathy:      Cervical: No cervical adenopathy.   Skin:     General: Skin is warm and dry.      Findings: No lesion or rash.   Neurological:      Mental Status: He is alert. Mental status is at baseline.      Motor: Tremor present.      Deep Tendon Reflexes:      Reflex Scores:       Tricep reflexes are 0 on the right side and 0 on the left side.       Bicep reflexes are 0 on the " right side and 0 on the left side.       Brachioradialis reflexes are 0 on the right side and 0 on the left side.       Patellar reflexes are 2+ on the right side and 2+ on the left side.       Achilles reflexes are 1+ on the right side and 1+ on the left side.     Comments: Mild But fine tremors on the outstretched extremities.  He is able to hold his cup of coffee fairly well in the morning.    Motor strength is generally okay.  Slight wasting of muscles.   Psychiatric:         Behavior: Behavior normal.           Assessment:       1. Benign essential hypertension    2. Acquired hypothyroidism    3. Stage 3b chronic kidney disease    4. Chronic midline low back pain without sciatica    5. Gastroesophageal reflux disease with esophagitis without hemorrhage    6. Belching symptom           Lab Visit on 06/13/2022   Component Date Value Ref Range Status    Glucose 06/13/2022 101  70 - 110 mg/dL Final    Sodium 06/13/2022 135 (A) 136 - 145 mmol/L Final    Potassium 06/13/2022 4.3  3.5 - 5.1 mmol/L Final    Chloride 06/13/2022 102  95 - 110 mmol/L Final    CO2 06/13/2022 26  23 - 29 mmol/L Final    BUN 06/13/2022 36 (A) 8 - 23 mg/dL Final    Calcium 06/13/2022 8.9  8.7 - 10.5 mg/dL Final    Creatinine 06/13/2022 1.8 (A) 0.5 - 1.4 mg/dL Final    Albumin 06/13/2022 4.0  3.5 - 5.2 g/dL Final    Phosphorus 06/13/2022 3.3  2.7 - 4.5 mg/dL Final    eGFR if  06/13/2022 41.1 (A) >60 mL/min/1.73 m^2 Final    eGFR if non African American 06/13/2022 35.5 (A) >60 mL/min/1.73 m^2 Final    Anion Gap 06/13/2022 7 (A) 8 - 16 mmol/L Final    PTH, Intact 06/13/2022 50.0  9.0 - 77.0 pg/mL Final    WBC 06/13/2022 8.13  3.90 - 12.70 K/uL Final    RBC 06/13/2022 4.12 (A) 4.60 - 6.20 M/uL Final    Hemoglobin 06/13/2022 13.2 (A) 14.0 - 18.0 g/dL Final    Hematocrit 06/13/2022 39.9 (A) 40.0 - 54.0 % Final    MCV 06/13/2022 97  82 - 98 fL Final    MCH 06/13/2022 32.0 (A) 27.0 - 31.0 pg Final    MCHC  06/13/2022 33.1  32.0 - 36.0 g/dL Final    RDW 06/13/2022 12.7  11.5 - 14.5 % Final    Platelets 06/13/2022 349  150 - 450 K/uL Final    MPV 06/13/2022 9.4  9.2 - 12.9 fL Final    Immature Granulocytes 06/13/2022 0.2  0.0 - 0.5 % Final    Gran # (ANC) 06/13/2022 5.2  1.8 - 7.7 K/uL Final    Immature Grans (Abs) 06/13/2022 0.02  0.00 - 0.04 K/uL Final    Lymph # 06/13/2022 1.7  1.0 - 4.8 K/uL Final    Mono # 06/13/2022 1.0  0.3 - 1.0 K/uL Final    Eos # 06/13/2022 0.2  0.0 - 0.5 K/uL Final    Baso # 06/13/2022 0.06  0.00 - 0.20 K/uL Final    nRBC 06/13/2022 0  0 /100 WBC Final    Gran % 06/13/2022 63.9  38.0 - 73.0 % Final    Lymph % 06/13/2022 20.8  18.0 - 48.0 % Final    Mono % 06/13/2022 11.9  4.0 - 15.0 % Final    Eosinophil % 06/13/2022 2.5  0.0 - 8.0 % Final    Basophil % 06/13/2022 0.7  0.0 - 1.9 % Final    Differential Method 06/13/2022 Automated   Final         Plan:           Benign essential hypertension    Acquired hypothyroidism    Stage 3b chronic kidney disease    Chronic midline low back pain without sciatica    Gastroesophageal reflux disease with esophagitis without hemorrhage    Belching symptom    Patient's medical issues have been reviewed.    Sodium level is a little low.  I will check with him if he drinks a lot of free water.  Maybe needs to cut down on the free water.    Blood pressure control is borderline stable.  Arrival blood pressure was elevated but subsequently seem to settle down.    Kidney status is borderline and now with creatinine of 1.8.  Sodium level has gone down to 135. GFR is 35.5.    He does not drink excessive free fluids.  He has been watching his salt at has cut down on salt.  Perhaps a little more salt intake might be helpful and not too much of free fluids but perhaps a lemonade with little extra salt.  Will check his basic metabolic panel and follow his creatinine in about another 4 months for follow-up.    PTH levels are okay.    Blood counts show  normocytic normochromic mild anemia.    Reflux symptoms are stable at this point and uses pantoprazole.  Try to minimize pantoprazole use.    Energy levels are fair for his age and station of life.  Yesterday he mowed his lawn in the afternoon.  He plays golf in the morning.    Low back pain is stable at this point.  No worse and no better.    Continue with COVID precautions    Discussed long-term social plans, safety issues and backup family members.  Sometimes he had plan to moved to [facility] and have a house on the see side.  However that plan has not come to fruition as yet because of fluctuations in housing market and also nobody is willing to sell the property.  No children.  He has his friends and cousins with whom he mingles.    Pending anything new, I will see him back in 4 months time and labs before follow-up.    Spent oziel 30 minutes with patient which involved review of pts medical conditions, labs, medications and with 50% of time face-to-face discussion about medical problems, management and any applicable changes.          Current Outpatient Medications:     levothyroxine (SYNTHROID) 100 MCG tablet, TAKE 1 TABLET(100 MCG) BY MOUTH BEFORE BREAKFAST, Disp: 90 tablet, Rfl: 2    losartan (COZAAR) 50 MG tablet, TAKE 1 TABLET BY MOUTH EVERY EVENING, Disp: 90 tablet, Rfl: 2    pantoprazole (PROTONIX) 40 MG tablet, Take 40 mg by mouth once daily., Disp: , Rfl:

## 2022-10-17 ENCOUNTER — OFFICE VISIT (OUTPATIENT)
Dept: FAMILY MEDICINE | Facility: CLINIC | Age: 77
End: 2022-10-17
Payer: COMMERCIAL

## 2022-10-17 VITALS
HEART RATE: 93 BPM | HEIGHT: 69 IN | WEIGHT: 151 LBS | TEMPERATURE: 98 F | BODY MASS INDEX: 22.36 KG/M2 | RESPIRATION RATE: 18 BRPM | SYSTOLIC BLOOD PRESSURE: 148 MMHG | DIASTOLIC BLOOD PRESSURE: 84 MMHG | OXYGEN SATURATION: 97 %

## 2022-10-17 DIAGNOSIS — M79.605 PAIN AND SWELLING OF LEFT LOWER EXTREMITY: Primary | ICD-10-CM

## 2022-10-17 DIAGNOSIS — M79.89 PAIN AND SWELLING OF LEFT LOWER EXTREMITY: Primary | ICD-10-CM

## 2022-10-17 PROCEDURE — 3077F PR MOST RECENT SYSTOLIC BLOOD PRESSURE >= 140 MM HG: ICD-10-PCS | Mod: CPTII,S$GLB,, | Performed by: NURSE PRACTITIONER

## 2022-10-17 PROCEDURE — 3077F SYST BP >= 140 MM HG: CPT | Mod: CPTII,S$GLB,, | Performed by: NURSE PRACTITIONER

## 2022-10-17 PROCEDURE — 3079F PR MOST RECENT DIASTOLIC BLOOD PRESSURE 80-89 MM HG: ICD-10-PCS | Mod: CPTII,S$GLB,, | Performed by: NURSE PRACTITIONER

## 2022-10-17 PROCEDURE — 1159F MED LIST DOCD IN RCRD: CPT | Mod: CPTII,S$GLB,, | Performed by: NURSE PRACTITIONER

## 2022-10-17 PROCEDURE — 3079F DIAST BP 80-89 MM HG: CPT | Mod: CPTII,S$GLB,, | Performed by: NURSE PRACTITIONER

## 2022-10-17 PROCEDURE — 3288F FALL RISK ASSESSMENT DOCD: CPT | Mod: CPTII,S$GLB,, | Performed by: NURSE PRACTITIONER

## 2022-10-17 PROCEDURE — 1159F PR MEDICATION LIST DOCUMENTED IN MEDICAL RECORD: ICD-10-PCS | Mod: CPTII,S$GLB,, | Performed by: NURSE PRACTITIONER

## 2022-10-17 PROCEDURE — 1101F PT FALLS ASSESS-DOCD LE1/YR: CPT | Mod: CPTII,S$GLB,, | Performed by: NURSE PRACTITIONER

## 2022-10-17 PROCEDURE — 99213 OFFICE O/P EST LOW 20 MIN: CPT | Mod: S$GLB,,, | Performed by: NURSE PRACTITIONER

## 2022-10-17 PROCEDURE — 1101F PR PT FALLS ASSESS DOC 0-1 FALLS W/OUT INJ PAST YR: ICD-10-PCS | Mod: CPTII,S$GLB,, | Performed by: NURSE PRACTITIONER

## 2022-10-17 PROCEDURE — 3288F PR FALLS RISK ASSESSMENT DOCUMENTED: ICD-10-PCS | Mod: CPTII,S$GLB,, | Performed by: NURSE PRACTITIONER

## 2022-10-17 PROCEDURE — 99213 PR OFFICE/OUTPT VISIT, EST, LEVL III, 20-29 MIN: ICD-10-PCS | Mod: S$GLB,,, | Performed by: NURSE PRACTITIONER

## 2022-10-17 NOTE — PROGRESS NOTES
Patient ID: Jaswinder Reina is a 77 y.o. male.    Chief Complaint: Leg Pain (Pain in L lower leg, x 3 days, swelling ) and Hypertension    Mr. Reina presents today for urgent visit. He is a patient of Dr. Giovana FERRARI. He states he has been experiencing left leg pain and swelling for 4 days. He denies any pain or injury to area. Medication has been reviewed in detail to see if there is any potential causative agent in medication history. He states it is painful when he touches certain areas especially calf area. He does have history of edema to lower extremities as well. He states that is usually bilateral when that occurs. He denies any other issues or complaints. He states he took his blood pressure medication as prescribed this morning although his blood pressure is mildly elevated. He has appointment with Dr. Akhtar next month.     Leg Pain   The incident occurred 3 to 5 days ago. There was no injury mechanism. The pain is present in the left ankle and left leg (left calf area). The pain is at a severity of 4/10. The pain is moderate. The pain has been Fluctuating since onset. He reports no foreign bodies present. The symptoms are aggravated by palpation. He has tried rest and elevation for the symptoms. The treatment provided mild relief.   Hypertension  This is a recurrent problem. The current episode started more than 1 year ago. The problem has been gradually worsening since onset. The problem is uncontrolled. Associated symptoms include malaise/fatigue and peripheral edema. Pertinent negatives include no anxiety, blurred vision, chest pain, headaches, neck pain, orthopnea, palpitations, PND, shortness of breath or sweats. Agents associated with hypertension include thyroid hormones. There are no known risk factors for coronary artery disease. Past treatments include angiotensin blockers. There are no compliance problems.          Past Medical History:   Diagnosis Date    GERD (gastroesophageal reflux disease)      History of colonoscopy 6/12/2013    The entire colon was normal. Patient was recommended surveillance colonoscopy in 5 years.    History of endoscopy 3/22/2021    Upper GI endoscopy on 03/22/2021 by Dr. Ozzie Chan.  Examined esophagus was normal.  Biopsies were taken.  A medium-size hiatal hernia was present.  Stomach was normal.  The examined duodenum was normal.  Await pathology results.  Use Protonix 40 mg b.i.d..    Hyperlipidemia     Hypertension     Hypothyroidism      Past Surgical History:   Procedure Laterality Date    ADENOIDECTOMY      COLONOSCOPY      TONSILLECTOMY           Tobacco History:  reports that he quit smoking about 49 years ago. His smoking use included cigarettes. He started smoking about 60 years ago. He smoked an average of 2 packs per day. He has never used smokeless tobacco.      Review of patient's allergies indicates:  No Known Allergies    Current Outpatient Medications:     levothyroxine (SYNTHROID) 100 MCG tablet, TAKE 1 TABLET(100 MCG) BY MOUTH BEFORE BREAKFAST, Disp: 90 tablet, Rfl: 2    losartan (COZAAR) 50 MG tablet, TAKE 1 TABLET BY MOUTH EVERY EVENING, Disp: 90 tablet, Rfl: 2    pantoprazole (PROTONIX) 40 MG tablet, Take 40 mg by mouth once daily., Disp: , Rfl:     Review of Systems   Constitutional:  Positive for malaise/fatigue. Negative for activity change, appetite change, fatigue and fever.   HENT:  Negative for congestion, dental problem, nosebleeds, postnasal drip, rhinorrhea, sinus pain, sore throat and tinnitus.    Eyes:  Negative for blurred vision, pain, discharge, itching and visual disturbance.   Respiratory:  Negative for cough, chest tightness, shortness of breath and wheezing.    Cardiovascular:  Positive for leg swelling (left lower leg swelling). Negative for chest pain, palpitations, orthopnea and PND.   Gastrointestinal:  Negative for abdominal pain, blood in stool, constipation, diarrhea, nausea and vomiting.   Endocrine: Negative for cold  "intolerance, heat intolerance, polydipsia, polyphagia and polyuria.   Genitourinary:  Negative for difficulty urinating, flank pain, genital sores, hematuria and urgency.   Musculoskeletal:  Negative for arthralgias, myalgias and neck pain.   Skin:  Negative for rash and wound.   Allergic/Immunologic: Negative for environmental allergies and food allergies.   Neurological:  Negative for dizziness, light-headedness and headaches.   Hematological:  Negative for adenopathy. Does not bruise/bleed easily.   Psychiatric/Behavioral:  Negative for behavioral problems, confusion, decreased concentration, sleep disturbance and suicidal ideas. The patient is not nervous/anxious and is not hyperactive.         Objective:      Vitals:    10/17/22 1415   BP: (!) 148/84   Pulse: 93   Resp: 18   Temp: 97.9 °F (36.6 °C)   TempSrc: Oral   SpO2: 97%   Weight: 68.5 kg (151 lb)   Height: 5' 9" (1.753 m)     Physical Exam  Vitals reviewed.   Constitutional:       General: He is not in acute distress.     Appearance: Normal appearance. He is normal weight. He is not ill-appearing, toxic-appearing or diaphoretic.   HENT:      Head: Normocephalic and atraumatic.      Right Ear: Tympanic membrane and external ear normal. There is no impacted cerumen.      Left Ear: Tympanic membrane and external ear normal. There is no impacted cerumen.      Nose: Nose normal. No congestion or rhinorrhea.      Mouth/Throat:      Mouth: Mucous membranes are moist.      Pharynx: Oropharynx is clear. No oropharyngeal exudate or posterior oropharyngeal erythema.   Eyes:      General: No scleral icterus.        Right eye: No discharge.         Left eye: No discharge.      Extraocular Movements: Extraocular movements intact.      Conjunctiva/sclera: Conjunctivae normal.      Pupils: Pupils are equal, round, and reactive to light.   Cardiovascular:      Rate and Rhythm: Normal rate and regular rhythm.      Pulses: Normal pulses.      Heart sounds: Normal heart " sounds. No murmur heard.    No friction rub. No gallop.   Pulmonary:      Effort: Pulmonary effort is normal. No respiratory distress.      Breath sounds: Normal breath sounds. No wheezing, rhonchi or rales.   Chest:      Chest wall: No tenderness.   Abdominal:      General: Abdomen is flat. Bowel sounds are normal.      Palpations: Abdomen is soft. There is no mass.      Tenderness: There is no abdominal tenderness. There is no guarding or rebound.   Musculoskeletal:         General: No swelling or signs of injury. Normal range of motion.      Cervical back: Normal range of motion and neck supple. No rigidity or tenderness.      Right lower leg: No edema.      Left lower leg: Swelling present. 2+ Edema present.      Left ankle: Swelling present.        Legs:       Comments: Positive Aguilar's. Patient states he has pain in lower leg when left foot was flexed. He does have a history of edema in lower extremities. Mild erythema noted to left lower leg on assessment. Swelling stops at ankles. Feet has some varicose veins and normal pulse. Extremity warm to touch.    Skin:     General: Skin is warm and dry.      Capillary Refill: Capillary refill takes less than 2 seconds.      Coloration: Skin is not pale.      Findings: No bruising or erythema.   Neurological:      General: No focal deficit present.      Mental Status: He is alert and oriented to person, place, and time. Mental status is at baseline.      Motor: No weakness.      Coordination: Coordination normal.      Gait: Gait normal.   Psychiatric:         Mood and Affect: Mood normal.         Behavior: Behavior normal.         Thought Content: Thought content normal.         Judgment: Judgment normal.           Assessment:       1. Pain and swelling of left lower extremity           Plan:       Pain and swelling of left lower extremity  -     US Lower Extremity Veins Left; Future; Expected date: 10/17/2022    I will send patient to have ultrasound done to rule  out DVT. Differentials include lower extremity dependent edema, varicose veins or a vascular insufficiency. I will call patient with results and will also review them with PCP once they become available. If not DVT we will have to treat patient symptomatically.     Follow up if symptoms worsen or fail to improve.        10/17/2022 Evi Corcoran NP

## 2022-10-18 ENCOUNTER — HOSPITAL ENCOUNTER (OUTPATIENT)
Dept: RADIOLOGY | Facility: HOSPITAL | Age: 77
Discharge: HOME | End: 2022-10-18
Attending: NURSE PRACTITIONER
Payer: COMMERCIAL

## 2022-10-18 DIAGNOSIS — M79.605 PAIN AND SWELLING OF LEFT LOWER EXTREMITY: ICD-10-CM

## 2022-10-18 DIAGNOSIS — M79.89 PAIN AND SWELLING OF LEFT LOWER EXTREMITY: ICD-10-CM

## 2022-10-18 PROCEDURE — 93971 EXTREMITY STUDY: CPT | Mod: TC,LT

## 2022-11-17 ENCOUNTER — LAB VISIT (OUTPATIENT)
Dept: LAB | Facility: HOSPITAL | Age: 77
End: 2022-11-17
Attending: INTERNAL MEDICINE
Payer: COMMERCIAL

## 2022-11-17 DIAGNOSIS — N18.32 STAGE 3B CHRONIC KIDNEY DISEASE: ICD-10-CM

## 2022-11-17 DIAGNOSIS — D64.9 NORMOCYTIC NORMOCHROMIC ANEMIA: ICD-10-CM

## 2022-11-17 LAB
ALBUMIN SERPL BCP-MCNC: 3.9 G/DL (ref 3.5–5.2)
ANION GAP SERPL CALC-SCNC: 5 MMOL/L (ref 8–16)
BUN SERPL-MCNC: 34 MG/DL (ref 8–23)
CALCIUM SERPL-MCNC: 8.6 MG/DL (ref 8.7–10.5)
CHLORIDE SERPL-SCNC: 105 MMOL/L (ref 95–110)
CO2 SERPL-SCNC: 26 MMOL/L (ref 23–29)
CREAT SERPL-MCNC: 1.9 MG/DL (ref 0.5–1.4)
EST. GFR  (NO RACE VARIABLE): 35.9 ML/MIN/1.73 M^2
GLUCOSE SERPL-MCNC: 90 MG/DL (ref 70–110)
MAGNESIUM SERPL-MCNC: 2.3 MG/DL (ref 1.6–2.6)
PHOSPHATE SERPL-MCNC: 3.6 MG/DL (ref 2.7–4.5)
POTASSIUM SERPL-SCNC: 4.7 MMOL/L (ref 3.5–5.1)
SODIUM SERPL-SCNC: 136 MMOL/L (ref 136–145)

## 2022-11-17 PROCEDURE — 80069 RENAL FUNCTION PANEL: CPT | Performed by: INTERNAL MEDICINE

## 2022-11-17 PROCEDURE — 83735 ASSAY OF MAGNESIUM: CPT | Performed by: INTERNAL MEDICINE

## 2022-11-17 PROCEDURE — 36415 COLL VENOUS BLD VENIPUNCTURE: CPT | Performed by: INTERNAL MEDICINE

## 2022-11-20 NOTE — PROGRESS NOTES
Subjective:       Patient ID: Jaswinder Reina is a 77 y.o. male.    Chief Complaint: Hypertension, Gastroesophageal Reflux, Follow-up, Thyroid Problem, and Chronic Kidney Disease    PATIENT IS A 77-YEAR-OLD GENTLEMAN WHO COMES FOLLOW-UP.  UNDERLYING MEDICAL ISSUES ARE AS BELOW:-    1.-HYPERTENSION CURRENTLY ON LOSARTAN 50 MG   2.-HYPOTHYROIDISM CURRENTLY ON LEVOTHYROXINE 100 MCG   3.-CHRONIC KIDNEY DISEASE WITH RECENT CREATININE REACHING UP TO 1.9.    He is also known to have addisonian anemia.    I did take the opportunity to review a recent CT scan of abdomen for kidney stone protocol.  Multiple bilateral renal cysts were noted the largest 1 being 4 cm.  The kidney size was considered to be in acceptable range.  Please note that this is not representative of adult polycystic kidney disease.  Prostate was noted to be within normal size and range.    PLEASE NOTE THAT THE CREATININE LEVEL WAS 1.2 IN 2002 AND IT TRAVIS UP GRADUALLY TO 1.5 IN 2006.  THIS HAS BEEN REVIEWED FROM HIS LABS AS DOCUMENTED IN THE EPIC SYSTEM.  This was reviewed with the patient himself also.    Recent PTH levels are within normal range.    Recent visit without affiliate nurse practitioner miss antonina black has been noted for left lower extremity swelling and discomfort.  Ultrasound was negative for blood clot and the differential diagnosis include chronic venous insufficiency, varicose veins, musculoskeletal problems.    His leg pain has dissipated now.  Cause of leg pain is unclear at this point.    He has been taking pantoprazole 40 mg twice a day.  It is less for reflux rather than a unusual taste which comes to his mouth.  The cause of this unusual taste is not clear.  Could be because of kidney condition? .  I did have a discussion about this issue and he agrees to cut down to 1 pill a day and see how he does when he comes back for follow-up in 3-4 months time.  More interested in protecting his kidneys rather than worrying about  taste in the mouth.    Hypertension  This is a chronic problem. The current episode started more than 1 year ago. The problem is unchanged. The problem is uncontrolled. Pertinent negatives include no anxiety, malaise/fatigue, palpitations or peripheral edema. Risk factors for coronary artery disease include sedentary lifestyle. Past treatments include angiotensin blockers. Identifiable causes of hypertension include a thyroid problem.   Gastroesophageal Reflux  He reports no hoarse voice or no wheezing. This is a recurrent problem. The current episode started more than 1 year ago. He has tried a PPI (Taking pantoprazole to per day.) for the symptoms. The treatment provided moderate relief.   Thyroid Problem  Presents for follow-up visit. Patient reports no anxiety, constipation, diarrhea, hair loss, hoarse voice or palpitations. The symptoms have been stable.     Past Medical History:   Diagnosis Date    GERD (gastroesophageal reflux disease)     History of colonoscopy 2013    The entire colon was normal. Patient was recommended surveillance colonoscopy in 5 years.    History of endoscopy 3/22/2021    Upper GI endoscopy on 2021 by Dr. Ozzie Chan.  Examined esophagus was normal.  Biopsies were taken.  A medium-size hiatal hernia was present.  Stomach was normal.  The examined duodenum was normal.  Await pathology results.  Use Protonix 40 mg b.i.d..    Hyperlipidemia     Hypertension     Hypothyroidism      Social History     Socioeconomic History    Marital status: Single    Number of children: 0   Occupational History    Occupation:  [employer] retd   Tobacco Use    Smoking status: Former     Packs/day: 2.00     Types: Cigarettes     Start date: 1962     Quit date: 1973     Years since quittin.9    Smokeless tobacco: Never   Substance and Sexual Activity    Alcohol use: Yes     Alcohol/week: 1.0 - 2.0 standard drink     Types: 1 - 2 Glasses of wine per week     Comment: occ     Drug use: No    Sexual activity: Not Currently     Partners: Female     Comment: Not sexually active     Social Determinants of Health     Financial Resource Strain: Low Risk     Difficulty of Paying Living Expenses: Not hard at all   Food Insecurity: No Food Insecurity    Worried About Running Out of Food in the Last Year: Never true    Ran Out of Food in the Last Year: Never true   Transportation Needs: No Transportation Needs    Lack of Transportation (Medical): No    Lack of Transportation (Non-Medical): No   Physical Activity: Sufficiently Active    Days of Exercise per Week: 4 days    Minutes of Exercise per Session: 40 min   Stress: No Stress Concern Present    Feeling of Stress : Not at all   Social Connections: Moderately Isolated    Frequency of Communication with Friends and Family: Twice a week    Frequency of Social Gatherings with Friends and Family: Twice a week    Attends Buddhist Services: 1 to 4 times per year    Active Member of Clubs or Organizations: No    Attends Club or Organization Meetings: Never    Marital Status: Never    Housing Stability: Low Risk     Unable to Pay for Housing in the Last Year: No    Number of Places Lived in the Last Year: 1    Unstable Housing in the Last Year: No     Past Surgical History:   Procedure Laterality Date    ADENOIDECTOMY      COLONOSCOPY      TONSILLECTOMY       Family History   Problem Relation Age of Onset    Pneumonia Mother     COPD Mother         smoker    Hypertension Father     Suicide Father        Review of Systems   Constitutional:  Negative for activity change, malaise/fatigue and unexpected weight change.   HENT:  Negative for hearing loss, hoarse voice, rhinorrhea and trouble swallowing.    Eyes:  Negative for discharge, redness and visual disturbance.   Respiratory:  Negative for chest tightness and wheezing.    Cardiovascular:  Negative for palpitations.   Gastrointestinal:  Negative for abdominal distention, anal bleeding, blood in  "stool, constipation and diarrhea.   Endocrine: Negative for polydipsia and polyuria.   Genitourinary:  Negative for difficulty urinating, hematuria and urgency.        He does not have to get up in the middle of night to go to the bathroom.   Skin:  Negative for color change, pallor and wound.   Psychiatric/Behavioral:  Negative for confusion and dysphoric mood. The patient is not nervous/anxious.        Objective:      Blood pressure (!) 162/77, pulse 86, height 5' 9" (1.753 m), weight 68.9 kg (152 lb). Body mass index is 22.45 kg/m².  Physical Exam  Vitals and nursing note reviewed.   Constitutional:       General: He is not in acute distress.     Appearance: Normal appearance. He is well-developed. He is not ill-appearing, toxic-appearing or diaphoretic.      Comments: BMI is 22.45   HENT:      Head: Normocephalic and atraumatic.      Mouth/Throat:      Palate: No mass.   Eyes:      General: No scleral icterus.     Conjunctiva/sclera: Conjunctivae normal.   Neck:      Thyroid: No thyromegaly.      Vascular: No JVD.      Trachea: No tracheal deviation.   Cardiovascular:      Rate and Rhythm: Normal rate and regular rhythm.      Heart sounds: Normal heart sounds. No murmur heard.    No friction rub. No gallop.   Pulmonary:      Effort: Pulmonary effort is normal. No respiratory distress.      Breath sounds: Normal breath sounds.   Abdominal:      General: There is no distension.      Palpations: Abdomen is soft.      Tenderness: There is no abdominal tenderness.   Musculoskeletal:      Cervical back: Neck supple.      Right lower leg: No edema.      Left lower leg: No edema.      Comments: Back does not show any significant issues at this point.   Lymphadenopathy:      Cervical: No cervical adenopathy.   Skin:     General: Skin is warm and dry.      Findings: No lesion or rash.   Neurological:      Mental Status: He is alert. Mental status is at baseline.      Motor: Tremor present.      Comments: Mild But fine " tremors on the outstretched extremities.  He is able to hold his cup of coffee fairly well in the morning.       Psychiatric:         Behavior: Behavior normal.         Assessment:       1. Benign essential hypertension    2. Acquired hypothyroidism    3. Stage 3b chronic kidney disease    4. Gastroesophageal reflux disease with esophagitis without hemorrhage    5. Normocytic normochromic anemia           Lab Visit on 11/17/2022   Component Date Value Ref Range Status    Glucose 11/17/2022 90  70 - 110 mg/dL Final    Sodium 11/17/2022 136  136 - 145 mmol/L Final    Potassium 11/17/2022 4.7  3.5 - 5.1 mmol/L Final    Chloride 11/17/2022 105  95 - 110 mmol/L Final    CO2 11/17/2022 26  23 - 29 mmol/L Final    BUN 11/17/2022 34 (H)  8 - 23 mg/dL Final    Calcium 11/17/2022 8.6 (L)  8.7 - 10.5 mg/dL Final    Creatinine 11/17/2022 1.9 (H)  0.5 - 1.4 mg/dL Final    Albumin 11/17/2022 3.9  3.5 - 5.2 g/dL Final    Phosphorus 11/17/2022 3.6  2.7 - 4.5 mg/dL Final    eGFR 11/17/2022 35.9 (A)  >60 mL/min/1.73 m^2 Final    Anion Gap 11/17/2022 5 (L)  8 - 16 mmol/L Final    Magnesium 11/17/2022 2.3  1.6 - 2.6 mg/dL Final     FINDINGS:  CT Abdomen:     The lung bases are clear.  There is no pericardial effusion.     The liver, spleen and pancreas have a normal noncontrast appearance.  The gallbladder and adrenal glands are normal.     The kidneys are symmetric in size without hydronephrosis or calculi.  There are bilateral renal cysts the largest on the left measuring 4 cm.  The largest on the right measuring 3 cm.     There are no thick-walled or dilated bowel loops.  There is a moderate size hiatal hernia.  There is no adenopathy.  There is diffuse vascular calcification of the aorta.     CT Pelvis:     The bladder and prostate gland are normal.  There is no pelvic adenopathy.  There are no distal ureteral stones.     There is levoscoliosis of the lumbar spine with multilevel degenerative disc disease.  There is mild  anterolisthesis of L4 on L5.     Impression:     No evidence of renal stones.  There bilateral renal cysts.     Moderate size hiatal hernia     Levoscoliosis of the lumbar spine with grade 1 anterolisthesis of L4 on L5        Electronically signed by: Lavonne Jeffery MD  Date:                                            11/28/2020  Time:                                           09:25           Exam Ended: 11/28/20 09:05 Last Resulted: 11/28/20 09:25           CLINICAL HISTORY:  77 years Male Rule out DVT. No prior injury to that extremity. Swelling and pain started 4 days ago     FINDINGS: Grayscale, color and spectral Doppler analysis of the left lower extremity deep venous system was performed.     There is normal compressibility, with normal flow by color and spectral Doppler analysis in the left lower extremity deep venous system, with normal augmentation and no evidence of deep venous thrombosis.     IMPRESSION: Negative for DVT.     Electronically signed by:  Eliceo Lemon MD  10/18/2022 1:31 PM CDT Workstation: [id number]           Specimen Collected: 10/18/22 13:02 Last Resulted: 10/18/22 13:31                  Plan:           Benign essential hypertension  -     amLODIPine (NORVASC) 5 MG tablet; Take 1 tablet (5 mg total) by mouth every morning.  Dispense: 90 tablet; Refill: 2  -     Microalbumin/Creatinine Ratio, Urine; Future; Expected date: 02/20/2023    Acquired hypothyroidism  -     TSH; Future; Expected date: 02/20/2023    Stage 3b chronic kidney disease  -     Renal Function Panel; Future; Expected date: 02/20/2023  -     Phosphorus; Future; Expected date: 02/20/2023  -     Magnesium; Future; Expected date: 02/20/2023    Gastroesophageal reflux disease with esophagitis without hemorrhage    Normocytic normochromic anemia    Patient's blood pressures continue to be elevated.  He has chronic kidney disease stage IIIB with slight rise in creatinine to 1.9.      I did take the opportunity to review his  records as back as 2002 when his creatinine was 1.2 and thereafter there was a steady rise in creatinine over the years.      I did also review and imaging study of CT scan which showed some cyst mostly on the left side.      Patient's urine output is fairly good.      Blood pressure control will be important and I will add 5 mg of amlodipine to the regimen.      He has been taking pantoprazole 40 mg twice a day and there have been some reports of long-term use of PPIs on kidneys and he agrees to cut down to 1 pill a day.      Continue with other precautions.    Continue to remain hydrated.      Avoid anti-inflammatory medications like Motrin or Advil or aspirins in a lot of quantity to avoid affecting the kidneys.      I would like to see him back in 4 months time and repeat a renal panel again at that point and see how he is doing.  Also keep a log of his blood pressure readings and bring his blood pressure machine next time if possible.      Next visit will consider ordering an ultrasound to check his kidneys again and see what happens to the cysts.    I did try to review his family history but he has no siblings.  Parenteral history is also known to the point of what is documented but no kidney disease is tone thus far.    Check renal panel, magnesium and phosphorus level at follow-up.    Continue with COVID precautions.      Current Outpatient Medications:     levothyroxine (SYNTHROID) 100 MCG tablet, TAKE 1 TABLET(100 MCG) BY MOUTH BEFORE BREAKFAST, Disp: 90 tablet, Rfl: 2    losartan (COZAAR) 50 MG tablet, TAKE 1 TABLET BY MOUTH EVERY EVENING, Disp: 90 tablet, Rfl: 2    pantoprazole (PROTONIX) 40 MG tablet, Take 40 mg by mouth once daily., Disp: , Rfl:     amLODIPine (NORVASC) 5 MG tablet, Take 1 tablet (5 mg total) by mouth every morning., Disp: 90 tablet, Rfl: 2

## 2022-11-21 ENCOUNTER — OFFICE VISIT (OUTPATIENT)
Dept: FAMILY MEDICINE | Facility: CLINIC | Age: 77
End: 2022-11-21
Payer: COMMERCIAL

## 2022-11-21 VITALS
BODY MASS INDEX: 22.51 KG/M2 | DIASTOLIC BLOOD PRESSURE: 77 MMHG | HEIGHT: 69 IN | HEART RATE: 86 BPM | WEIGHT: 152 LBS | SYSTOLIC BLOOD PRESSURE: 162 MMHG

## 2022-11-21 DIAGNOSIS — N18.32 STAGE 3B CHRONIC KIDNEY DISEASE: ICD-10-CM

## 2022-11-21 DIAGNOSIS — K21.00 GASTROESOPHAGEAL REFLUX DISEASE WITH ESOPHAGITIS WITHOUT HEMORRHAGE: ICD-10-CM

## 2022-11-21 DIAGNOSIS — D64.9 NORMOCYTIC NORMOCHROMIC ANEMIA: ICD-10-CM

## 2022-11-21 DIAGNOSIS — E03.9 ACQUIRED HYPOTHYROIDISM: ICD-10-CM

## 2022-11-21 DIAGNOSIS — I10 BENIGN ESSENTIAL HYPERTENSION: Primary | ICD-10-CM

## 2022-11-21 PROCEDURE — 99214 OFFICE O/P EST MOD 30 MIN: CPT | Mod: S$GLB,,, | Performed by: INTERNAL MEDICINE

## 2022-11-21 PROCEDURE — 1101F PR PT FALLS ASSESS DOC 0-1 FALLS W/OUT INJ PAST YR: ICD-10-PCS | Mod: CPTII,S$GLB,, | Performed by: INTERNAL MEDICINE

## 2022-11-21 PROCEDURE — 99214 PR OFFICE/OUTPT VISIT, EST, LEVL IV, 30-39 MIN: ICD-10-PCS | Mod: S$GLB,,, | Performed by: INTERNAL MEDICINE

## 2022-11-21 PROCEDURE — 1160F RVW MEDS BY RX/DR IN RCRD: CPT | Mod: CPTII,S$GLB,, | Performed by: INTERNAL MEDICINE

## 2022-11-21 PROCEDURE — 3078F DIAST BP <80 MM HG: CPT | Mod: CPTII,S$GLB,, | Performed by: INTERNAL MEDICINE

## 2022-11-21 PROCEDURE — 1159F MED LIST DOCD IN RCRD: CPT | Mod: CPTII,S$GLB,, | Performed by: INTERNAL MEDICINE

## 2022-11-21 PROCEDURE — 3077F PR MOST RECENT SYSTOLIC BLOOD PRESSURE >= 140 MM HG: ICD-10-PCS | Mod: CPTII,S$GLB,, | Performed by: INTERNAL MEDICINE

## 2022-11-21 PROCEDURE — 3077F SYST BP >= 140 MM HG: CPT | Mod: CPTII,S$GLB,, | Performed by: INTERNAL MEDICINE

## 2022-11-21 PROCEDURE — 3288F FALL RISK ASSESSMENT DOCD: CPT | Mod: CPTII,S$GLB,, | Performed by: INTERNAL MEDICINE

## 2022-11-21 PROCEDURE — 1101F PT FALLS ASSESS-DOCD LE1/YR: CPT | Mod: CPTII,S$GLB,, | Performed by: INTERNAL MEDICINE

## 2022-11-21 PROCEDURE — 1160F PR REVIEW ALL MEDS BY PRESCRIBER/CLIN PHARMACIST DOCUMENTED: ICD-10-PCS | Mod: CPTII,S$GLB,, | Performed by: INTERNAL MEDICINE

## 2022-11-21 PROCEDURE — 3078F PR MOST RECENT DIASTOLIC BLOOD PRESSURE < 80 MM HG: ICD-10-PCS | Mod: CPTII,S$GLB,, | Performed by: INTERNAL MEDICINE

## 2022-11-21 PROCEDURE — 1159F PR MEDICATION LIST DOCUMENTED IN MEDICAL RECORD: ICD-10-PCS | Mod: CPTII,S$GLB,, | Performed by: INTERNAL MEDICINE

## 2022-11-21 PROCEDURE — 3288F PR FALLS RISK ASSESSMENT DOCUMENTED: ICD-10-PCS | Mod: CPTII,S$GLB,, | Performed by: INTERNAL MEDICINE

## 2022-11-21 RX ORDER — AMLODIPINE BESYLATE 5 MG/1
5 TABLET ORAL EVERY MORNING
Qty: 90 TABLET | Refills: 2 | Status: SHIPPED | OUTPATIENT
Start: 2022-11-21 | End: 2023-08-07

## 2023-01-05 RX ORDER — LOSARTAN POTASSIUM 50 MG/1
TABLET ORAL
Qty: 90 TABLET | Refills: 2 | Status: CANCELLED | OUTPATIENT
Start: 2023-01-05

## 2023-01-09 DIAGNOSIS — I10 BENIGN ESSENTIAL HYPERTENSION: Primary | ICD-10-CM

## 2023-01-09 RX ORDER — LOSARTAN POTASSIUM 50 MG/1
TABLET ORAL
Qty: 90 TABLET | Refills: 2 | Status: SHIPPED | OUTPATIENT
Start: 2023-01-09 | End: 2023-10-09

## 2023-01-09 RX ORDER — LOSARTAN POTASSIUM 50 MG/1
TABLET ORAL
Qty: 90 TABLET | Refills: 2 | Status: CANCELLED | OUTPATIENT
Start: 2023-01-09

## 2023-03-22 ENCOUNTER — LAB VISIT (OUTPATIENT)
Dept: LAB | Facility: HOSPITAL | Age: 78
End: 2023-03-22
Attending: INTERNAL MEDICINE
Payer: COMMERCIAL

## 2023-03-22 DIAGNOSIS — I10 BENIGN ESSENTIAL HYPERTENSION: ICD-10-CM

## 2023-03-22 DIAGNOSIS — N18.32 STAGE 3B CHRONIC KIDNEY DISEASE: ICD-10-CM

## 2023-03-22 DIAGNOSIS — E03.9 ACQUIRED HYPOTHYROIDISM: ICD-10-CM

## 2023-03-22 LAB
ALBUMIN SERPL BCP-MCNC: 3.6 G/DL (ref 3.5–5.2)
ALBUMIN/CREAT UR: 3.3 UG/MG (ref 0–30)
ANION GAP SERPL CALC-SCNC: 6 MMOL/L (ref 8–16)
BUN SERPL-MCNC: 37 MG/DL (ref 8–23)
CALCIUM SERPL-MCNC: 9 MG/DL (ref 8.7–10.5)
CHLORIDE SERPL-SCNC: 104 MMOL/L (ref 95–110)
CO2 SERPL-SCNC: 27 MMOL/L (ref 23–29)
CREAT SERPL-MCNC: 2 MG/DL (ref 0.5–1.4)
CREAT UR-MCNC: 209 MG/DL (ref 23–375)
EST. GFR  (NO RACE VARIABLE): 33.5 ML/MIN/1.73 M^2
GLUCOSE SERPL-MCNC: 135 MG/DL (ref 70–110)
MAGNESIUM SERPL-MCNC: 2 MG/DL (ref 1.6–2.6)
MICROALBUMIN UR DL<=1MG/L-MCNC: 6.9 UG/ML
PHOSPHATE SERPL-MCNC: 3.7 MG/DL (ref 2.7–4.5)
PHOSPHATE SERPL-MCNC: 3.7 MG/DL (ref 2.7–4.5)
POTASSIUM SERPL-SCNC: 4.5 MMOL/L (ref 3.5–5.1)
SODIUM SERPL-SCNC: 137 MMOL/L (ref 136–145)
TSH SERPL DL<=0.005 MIU/L-ACNC: 0.39 UIU/ML (ref 0.34–5.6)

## 2023-03-22 PROCEDURE — 83735 ASSAY OF MAGNESIUM: CPT | Performed by: INTERNAL MEDICINE

## 2023-03-22 PROCEDURE — 84443 ASSAY THYROID STIM HORMONE: CPT | Performed by: INTERNAL MEDICINE

## 2023-03-22 PROCEDURE — 80069 RENAL FUNCTION PANEL: CPT | Performed by: INTERNAL MEDICINE

## 2023-03-22 PROCEDURE — 82570 ASSAY OF URINE CREATININE: CPT | Performed by: INTERNAL MEDICINE

## 2023-03-22 PROCEDURE — 36415 COLL VENOUS BLD VENIPUNCTURE: CPT | Performed by: INTERNAL MEDICINE

## 2023-03-27 ENCOUNTER — OFFICE VISIT (OUTPATIENT)
Dept: FAMILY MEDICINE | Facility: CLINIC | Age: 78
End: 2023-03-27
Payer: COMMERCIAL

## 2023-03-27 VITALS
HEIGHT: 69 IN | HEART RATE: 73 BPM | WEIGHT: 154 LBS | SYSTOLIC BLOOD PRESSURE: 134 MMHG | DIASTOLIC BLOOD PRESSURE: 61 MMHG | BODY MASS INDEX: 22.81 KG/M2

## 2023-03-27 DIAGNOSIS — D64.9 NORMOCYTIC NORMOCHROMIC ANEMIA: ICD-10-CM

## 2023-03-27 DIAGNOSIS — K21.00 GASTROESOPHAGEAL REFLUX DISEASE WITH ESOPHAGITIS WITHOUT HEMORRHAGE: ICD-10-CM

## 2023-03-27 DIAGNOSIS — E03.9 ACQUIRED HYPOTHYROIDISM: ICD-10-CM

## 2023-03-27 DIAGNOSIS — N18.32 STAGE 3B CHRONIC KIDNEY DISEASE: ICD-10-CM

## 2023-03-27 DIAGNOSIS — I10 BENIGN ESSENTIAL HYPERTENSION: Primary | ICD-10-CM

## 2023-03-27 PROCEDURE — 1126F PR PAIN SEVERITY QUANTIFIED, NO PAIN PRESENT: ICD-10-PCS | Mod: CPTII,S$GLB,, | Performed by: INTERNAL MEDICINE

## 2023-03-27 PROCEDURE — 1159F PR MEDICATION LIST DOCUMENTED IN MEDICAL RECORD: ICD-10-PCS | Mod: CPTII,S$GLB,, | Performed by: INTERNAL MEDICINE

## 2023-03-27 PROCEDURE — 1160F RVW MEDS BY RX/DR IN RCRD: CPT | Mod: CPTII,S$GLB,, | Performed by: INTERNAL MEDICINE

## 2023-03-27 PROCEDURE — 3078F DIAST BP <80 MM HG: CPT | Mod: CPTII,S$GLB,, | Performed by: INTERNAL MEDICINE

## 2023-03-27 PROCEDURE — 3075F SYST BP GE 130 - 139MM HG: CPT | Mod: CPTII,S$GLB,, | Performed by: INTERNAL MEDICINE

## 2023-03-27 PROCEDURE — 1159F MED LIST DOCD IN RCRD: CPT | Mod: CPTII,S$GLB,, | Performed by: INTERNAL MEDICINE

## 2023-03-27 PROCEDURE — 3075F PR MOST RECENT SYSTOLIC BLOOD PRESS GE 130-139MM HG: ICD-10-PCS | Mod: CPTII,S$GLB,, | Performed by: INTERNAL MEDICINE

## 2023-03-27 PROCEDURE — 1160F PR REVIEW ALL MEDS BY PRESCRIBER/CLIN PHARMACIST DOCUMENTED: ICD-10-PCS | Mod: CPTII,S$GLB,, | Performed by: INTERNAL MEDICINE

## 2023-03-27 PROCEDURE — 1126F AMNT PAIN NOTED NONE PRSNT: CPT | Mod: CPTII,S$GLB,, | Performed by: INTERNAL MEDICINE

## 2023-03-27 PROCEDURE — 3078F PR MOST RECENT DIASTOLIC BLOOD PRESSURE < 80 MM HG: ICD-10-PCS | Mod: CPTII,S$GLB,, | Performed by: INTERNAL MEDICINE

## 2023-03-27 PROCEDURE — 1101F PT FALLS ASSESS-DOCD LE1/YR: CPT | Mod: CPTII,S$GLB,, | Performed by: INTERNAL MEDICINE

## 2023-03-27 PROCEDURE — 3288F FALL RISK ASSESSMENT DOCD: CPT | Mod: CPTII,S$GLB,, | Performed by: INTERNAL MEDICINE

## 2023-03-27 PROCEDURE — 99213 OFFICE O/P EST LOW 20 MIN: CPT | Mod: S$GLB,,, | Performed by: INTERNAL MEDICINE

## 2023-03-27 PROCEDURE — 3288F PR FALLS RISK ASSESSMENT DOCUMENTED: ICD-10-PCS | Mod: CPTII,S$GLB,, | Performed by: INTERNAL MEDICINE

## 2023-03-27 PROCEDURE — 99213 PR OFFICE/OUTPT VISIT, EST, LEVL III, 20-29 MIN: ICD-10-PCS | Mod: S$GLB,,, | Performed by: INTERNAL MEDICINE

## 2023-03-27 PROCEDURE — 1101F PR PT FALLS ASSESS DOC 0-1 FALLS W/OUT INJ PAST YR: ICD-10-PCS | Mod: CPTII,S$GLB,, | Performed by: INTERNAL MEDICINE

## 2023-05-12 ENCOUNTER — LAB VISIT (OUTPATIENT)
Dept: LAB | Facility: HOSPITAL | Age: 78
End: 2023-05-12
Attending: STUDENT IN AN ORGANIZED HEALTH CARE EDUCATION/TRAINING PROGRAM
Payer: COMMERCIAL

## 2023-05-12 DIAGNOSIS — R80.8 NEPHROGENOUS PROTEINURIA: ICD-10-CM

## 2023-05-12 DIAGNOSIS — N39.0 URINARY TRACT INFECTION, SITE NOT SPECIFIED: ICD-10-CM

## 2023-05-12 DIAGNOSIS — N18.2 CHRONIC KIDNEY DISEASE, STAGE II (MILD): Primary | ICD-10-CM

## 2023-05-12 LAB
ALBUMIN SERPL BCP-MCNC: 3.9 G/DL (ref 3.5–5.2)
ALBUMIN/CREAT UR: 4.2 UG/MG (ref 0–30)
ANION GAP SERPL CALC-SCNC: 7 MMOL/L (ref 8–16)
BACTERIA #/AREA URNS HPF: NEGATIVE /HPF
BILIRUB UR QL STRIP: NEGATIVE
BUN SERPL-MCNC: 40 MG/DL (ref 8–23)
CALCIUM SERPL-MCNC: 8.8 MG/DL (ref 8.7–10.5)
CHLORIDE SERPL-SCNC: 107 MMOL/L (ref 95–110)
CLARITY UR: CLEAR
CO2 SERPL-SCNC: 24 MMOL/L (ref 23–29)
COLOR UR: YELLOW
CREAT SERPL-MCNC: 2 MG/DL (ref 0.5–1.4)
CREAT UR-MCNC: 170 MG/DL (ref 23–375)
CREAT UR-MCNC: 170 MG/DL (ref 23–375)
ERYTHROCYTE [DISTWIDTH] IN BLOOD BY AUTOMATED COUNT: 12.7 % (ref 11.5–14.5)
EST. GFR  (NO RACE VARIABLE): 33.5 ML/MIN/1.73 M^2
GLUCOSE SERPL-MCNC: 121 MG/DL (ref 70–110)
GLUCOSE UR QL STRIP: NEGATIVE
HCT VFR BLD AUTO: 38 % (ref 40–54)
HGB BLD-MCNC: 12.5 G/DL (ref 14–18)
HGB UR QL STRIP: NEGATIVE
HYALINE CASTS #/AREA URNS LPF: 4 /LPF
KETONES UR QL STRIP: NEGATIVE
LEUKOCYTE ESTERASE UR QL STRIP: NEGATIVE
MAGNESIUM SERPL-MCNC: 2.2 MG/DL (ref 1.6–2.6)
MCH RBC QN AUTO: 32.1 PG (ref 27–31)
MCHC RBC AUTO-ENTMCNC: 32.9 G/DL (ref 32–36)
MCV RBC AUTO: 98 FL (ref 82–98)
MICROALBUMIN UR DL<=1MG/L-MCNC: 7.1 UG/ML
MICROSCOPIC COMMENT: ABNORMAL
NITRITE UR QL STRIP: NEGATIVE
PH UR STRIP: 5 [PH] (ref 5–8)
PHOSPHATE SERPL-MCNC: 3.6 MG/DL (ref 2.7–4.5)
PLATELET # BLD AUTO: 348 K/UL (ref 150–450)
PMV BLD AUTO: 9.4 FL (ref 9.2–12.9)
POTASSIUM SERPL-SCNC: 4.5 MMOL/L (ref 3.5–5.1)
PROT UR QL STRIP: ABNORMAL
PROT UR-MCNC: 19 MG/DL (ref 6–15)
PROT/CREAT UR: 0.11 MG/G{CREAT} (ref 0–0.2)
PTH-INTACT SERPL-MCNC: 49.1 PG/ML (ref 9–77)
RBC # BLD AUTO: 3.89 M/UL (ref 4.6–6.2)
RBC #/AREA URNS HPF: 1 /HPF (ref 0–4)
SODIUM SERPL-SCNC: 138 MMOL/L (ref 136–145)
SP GR UR STRIP: 1.02 (ref 1–1.03)
SQUAMOUS #/AREA URNS HPF: 0 /HPF
URATE SERPL-MCNC: 5.2 MG/DL (ref 3.4–7)
URN SPEC COLLECT METH UR: ABNORMAL
UROBILINOGEN UR STRIP-ACNC: NEGATIVE EU/DL
WBC # BLD AUTO: 7.95 K/UL (ref 3.9–12.7)
WBC #/AREA URNS HPF: 0 /HPF (ref 0–5)

## 2023-05-12 PROCEDURE — 82043 UR ALBUMIN QUANTITATIVE: CPT | Performed by: STUDENT IN AN ORGANIZED HEALTH CARE EDUCATION/TRAINING PROGRAM

## 2023-05-12 PROCEDURE — 85027 COMPLETE CBC AUTOMATED: CPT | Performed by: STUDENT IN AN ORGANIZED HEALTH CARE EDUCATION/TRAINING PROGRAM

## 2023-05-12 PROCEDURE — 84156 ASSAY OF PROTEIN URINE: CPT | Performed by: STUDENT IN AN ORGANIZED HEALTH CARE EDUCATION/TRAINING PROGRAM

## 2023-05-12 PROCEDURE — 83735 ASSAY OF MAGNESIUM: CPT | Performed by: STUDENT IN AN ORGANIZED HEALTH CARE EDUCATION/TRAINING PROGRAM

## 2023-05-12 PROCEDURE — 84550 ASSAY OF BLOOD/URIC ACID: CPT | Performed by: STUDENT IN AN ORGANIZED HEALTH CARE EDUCATION/TRAINING PROGRAM

## 2023-05-12 PROCEDURE — 80069 RENAL FUNCTION PANEL: CPT | Performed by: STUDENT IN AN ORGANIZED HEALTH CARE EDUCATION/TRAINING PROGRAM

## 2023-05-12 PROCEDURE — 36415 COLL VENOUS BLD VENIPUNCTURE: CPT | Performed by: STUDENT IN AN ORGANIZED HEALTH CARE EDUCATION/TRAINING PROGRAM

## 2023-05-12 PROCEDURE — 83970 ASSAY OF PARATHORMONE: CPT | Performed by: STUDENT IN AN ORGANIZED HEALTH CARE EDUCATION/TRAINING PROGRAM

## 2023-05-12 PROCEDURE — 81001 URINALYSIS AUTO W/SCOPE: CPT | Performed by: STUDENT IN AN ORGANIZED HEALTH CARE EDUCATION/TRAINING PROGRAM

## 2023-05-16 DIAGNOSIS — N18.32 STAGE 3B CHRONIC KIDNEY DISEASE: Primary | ICD-10-CM

## 2023-08-06 DIAGNOSIS — I10 BENIGN ESSENTIAL HYPERTENSION: ICD-10-CM

## 2023-08-07 RX ORDER — AMLODIPINE BESYLATE 5 MG/1
TABLET ORAL
Qty: 90 TABLET | Refills: 2 | Status: SHIPPED | OUTPATIENT
Start: 2023-08-07 | End: 2024-04-01 | Stop reason: SDUPTHER

## 2023-08-21 ENCOUNTER — HOSPITAL ENCOUNTER (OUTPATIENT)
Dept: RADIOLOGY | Facility: HOSPITAL | Age: 78
Discharge: HOME | End: 2023-08-21
Attending: NURSE PRACTITIONER
Payer: COMMERCIAL

## 2023-08-21 DIAGNOSIS — N18.32 STAGE 3B CHRONIC KIDNEY DISEASE: ICD-10-CM

## 2023-08-21 PROCEDURE — 76770 US EXAM ABDO BACK WALL COMP: CPT | Mod: TC,PO

## 2023-08-23 ENCOUNTER — LAB VISIT (OUTPATIENT)
Dept: LAB | Facility: HOSPITAL | Age: 78
End: 2023-08-23
Attending: STUDENT IN AN ORGANIZED HEALTH CARE EDUCATION/TRAINING PROGRAM
Payer: COMMERCIAL

## 2023-08-23 DIAGNOSIS — N39.0 URINARY TRACT INFECTION, SITE NOT SPECIFIED: ICD-10-CM

## 2023-08-23 DIAGNOSIS — R80.0 ISOLATED NON-NEPHROTIC PROTEINURIA: ICD-10-CM

## 2023-08-23 DIAGNOSIS — N18.2 CHRONIC KIDNEY DISEASE, STAGE II (MILD): Primary | ICD-10-CM

## 2023-08-23 LAB
ALBUMIN SERPL BCP-MCNC: 3.9 G/DL (ref 3.5–5.2)
ANION GAP SERPL CALC-SCNC: 7 MMOL/L (ref 8–16)
BUN SERPL-MCNC: 41 MG/DL (ref 8–23)
CALCIUM SERPL-MCNC: 9 MG/DL (ref 8.7–10.5)
CHLORIDE SERPL-SCNC: 101 MMOL/L (ref 95–110)
CO2 SERPL-SCNC: 26 MMOL/L (ref 23–29)
CREAT SERPL-MCNC: 2.2 MG/DL (ref 0.5–1.4)
ERYTHROCYTE [DISTWIDTH] IN BLOOD BY AUTOMATED COUNT: 12.9 % (ref 11.5–14.5)
EST. GFR  (NO RACE VARIABLE): 29.9 ML/MIN/1.73 M^2
GLUCOSE SERPL-MCNC: 85 MG/DL (ref 70–110)
HCT VFR BLD AUTO: 35.1 % (ref 40–54)
HGB BLD-MCNC: 11.7 G/DL (ref 14–18)
MAGNESIUM SERPL-MCNC: 2.1 MG/DL (ref 1.6–2.6)
MCH RBC QN AUTO: 32.3 PG (ref 27–31)
MCHC RBC AUTO-ENTMCNC: 33.3 G/DL (ref 32–36)
MCV RBC AUTO: 97 FL (ref 82–98)
PHOSPHATE SERPL-MCNC: 3.7 MG/DL (ref 2.7–4.5)
PLATELET # BLD AUTO: 380 K/UL (ref 150–450)
PMV BLD AUTO: 9.6 FL (ref 9.2–12.9)
POTASSIUM SERPL-SCNC: 4.2 MMOL/L (ref 3.5–5.1)
PROT UR-MCNC: 12 MG/DL (ref 6–15)
PTH-INTACT SERPL-MCNC: 43 PG/ML (ref 9–77)
RBC # BLD AUTO: 3.62 M/UL (ref 4.6–6.2)
SODIUM SERPL-SCNC: 134 MMOL/L (ref 136–145)
URATE SERPL-MCNC: 5.9 MG/DL (ref 3.4–7)
WBC # BLD AUTO: 7.9 K/UL (ref 3.9–12.7)

## 2023-08-23 PROCEDURE — 83735 ASSAY OF MAGNESIUM: CPT | Performed by: STUDENT IN AN ORGANIZED HEALTH CARE EDUCATION/TRAINING PROGRAM

## 2023-08-23 PROCEDURE — 83970 ASSAY OF PARATHORMONE: CPT | Performed by: STUDENT IN AN ORGANIZED HEALTH CARE EDUCATION/TRAINING PROGRAM

## 2023-08-23 PROCEDURE — 84550 ASSAY OF BLOOD/URIC ACID: CPT | Performed by: STUDENT IN AN ORGANIZED HEALTH CARE EDUCATION/TRAINING PROGRAM

## 2023-08-23 PROCEDURE — 84156 ASSAY OF PROTEIN URINE: CPT | Performed by: STUDENT IN AN ORGANIZED HEALTH CARE EDUCATION/TRAINING PROGRAM

## 2023-08-23 PROCEDURE — 80069 RENAL FUNCTION PANEL: CPT | Performed by: STUDENT IN AN ORGANIZED HEALTH CARE EDUCATION/TRAINING PROGRAM

## 2023-08-23 PROCEDURE — 36415 COLL VENOUS BLD VENIPUNCTURE: CPT | Performed by: STUDENT IN AN ORGANIZED HEALTH CARE EDUCATION/TRAINING PROGRAM

## 2023-08-23 PROCEDURE — 85027 COMPLETE CBC AUTOMATED: CPT | Performed by: STUDENT IN AN ORGANIZED HEALTH CARE EDUCATION/TRAINING PROGRAM

## 2023-08-24 LAB
BILIRUB UR QL STRIP: NEGATIVE
CLARITY UR: CLEAR
COLOR UR: YELLOW
GLUCOSE UR QL STRIP: NEGATIVE
HGB UR QL STRIP: NEGATIVE
KETONES UR QL STRIP: NEGATIVE
LEUKOCYTE ESTERASE UR QL STRIP: NEGATIVE
NITRITE UR QL STRIP: NEGATIVE
PH UR STRIP: 6 [PH] (ref 5–8)
PROT UR QL STRIP: NEGATIVE
SP GR UR STRIP: 1.01 (ref 1–1.03)
URN SPEC COLLECT METH UR: NORMAL
UROBILINOGEN UR STRIP-ACNC: NEGATIVE EU/DL

## 2023-10-09 ENCOUNTER — OFFICE VISIT (OUTPATIENT)
Dept: FAMILY MEDICINE | Facility: CLINIC | Age: 78
End: 2023-10-09
Payer: COMMERCIAL

## 2023-10-09 VITALS
WEIGHT: 147 LBS | SYSTOLIC BLOOD PRESSURE: 124 MMHG | HEIGHT: 69 IN | DIASTOLIC BLOOD PRESSURE: 65 MMHG | HEART RATE: 65 BPM | BODY MASS INDEX: 21.77 KG/M2

## 2023-10-09 DIAGNOSIS — N18.32 STAGE 3B CHRONIC KIDNEY DISEASE: ICD-10-CM

## 2023-10-09 DIAGNOSIS — I10 BENIGN ESSENTIAL HYPERTENSION: Primary | ICD-10-CM

## 2023-10-09 DIAGNOSIS — R23.3 EASY BRUISING: ICD-10-CM

## 2023-10-09 DIAGNOSIS — E03.9 ACQUIRED HYPOTHYROIDISM: ICD-10-CM

## 2023-10-09 DIAGNOSIS — K21.00 GASTROESOPHAGEAL REFLUX DISEASE WITH ESOPHAGITIS WITHOUT HEMORRHAGE: ICD-10-CM

## 2023-10-09 DIAGNOSIS — D64.9 NORMOCYTIC NORMOCHROMIC ANEMIA: ICD-10-CM

## 2023-10-09 PROCEDURE — 1101F PR PT FALLS ASSESS DOC 0-1 FALLS W/OUT INJ PAST YR: ICD-10-PCS | Mod: CPTII,S$GLB,, | Performed by: INTERNAL MEDICINE

## 2023-10-09 PROCEDURE — 1101F PT FALLS ASSESS-DOCD LE1/YR: CPT | Mod: CPTII,S$GLB,, | Performed by: INTERNAL MEDICINE

## 2023-10-09 PROCEDURE — 1159F PR MEDICATION LIST DOCUMENTED IN MEDICAL RECORD: ICD-10-PCS | Mod: CPTII,S$GLB,, | Performed by: INTERNAL MEDICINE

## 2023-10-09 PROCEDURE — 1126F AMNT PAIN NOTED NONE PRSNT: CPT | Mod: CPTII,S$GLB,, | Performed by: INTERNAL MEDICINE

## 2023-10-09 PROCEDURE — 1160F PR REVIEW ALL MEDS BY PRESCRIBER/CLIN PHARMACIST DOCUMENTED: ICD-10-PCS | Mod: CPTII,S$GLB,, | Performed by: INTERNAL MEDICINE

## 2023-10-09 PROCEDURE — 1160F RVW MEDS BY RX/DR IN RCRD: CPT | Mod: CPTII,S$GLB,, | Performed by: INTERNAL MEDICINE

## 2023-10-09 PROCEDURE — 99214 OFFICE O/P EST MOD 30 MIN: CPT | Mod: S$GLB,,, | Performed by: INTERNAL MEDICINE

## 2023-10-09 PROCEDURE — 3288F FALL RISK ASSESSMENT DOCD: CPT | Mod: CPTII,S$GLB,, | Performed by: INTERNAL MEDICINE

## 2023-10-09 PROCEDURE — 3074F PR MOST RECENT SYSTOLIC BLOOD PRESSURE < 130 MM HG: ICD-10-PCS | Mod: CPTII,S$GLB,, | Performed by: INTERNAL MEDICINE

## 2023-10-09 PROCEDURE — 3288F PR FALLS RISK ASSESSMENT DOCUMENTED: ICD-10-PCS | Mod: CPTII,S$GLB,, | Performed by: INTERNAL MEDICINE

## 2023-10-09 PROCEDURE — 1159F MED LIST DOCD IN RCRD: CPT | Mod: CPTII,S$GLB,, | Performed by: INTERNAL MEDICINE

## 2023-10-09 PROCEDURE — 3078F PR MOST RECENT DIASTOLIC BLOOD PRESSURE < 80 MM HG: ICD-10-PCS | Mod: CPTII,S$GLB,, | Performed by: INTERNAL MEDICINE

## 2023-10-09 PROCEDURE — 3074F SYST BP LT 130 MM HG: CPT | Mod: CPTII,S$GLB,, | Performed by: INTERNAL MEDICINE

## 2023-10-09 PROCEDURE — 1126F PR PAIN SEVERITY QUANTIFIED, NO PAIN PRESENT: ICD-10-PCS | Mod: CPTII,S$GLB,, | Performed by: INTERNAL MEDICINE

## 2023-10-09 PROCEDURE — 3078F DIAST BP <80 MM HG: CPT | Mod: CPTII,S$GLB,, | Performed by: INTERNAL MEDICINE

## 2023-10-09 PROCEDURE — 99214 PR OFFICE/OUTPT VISIT, EST, LEVL IV, 30-39 MIN: ICD-10-PCS | Mod: S$GLB,,, | Performed by: INTERNAL MEDICINE

## 2023-10-09 RX ORDER — LOSARTAN POTASSIUM AND HYDROCHLOROTHIAZIDE 12.5; 5 MG/1; MG/1
1 TABLET ORAL DAILY
COMMUNITY
Start: 2023-08-06

## 2023-10-09 NOTE — PROGRESS NOTES
Subjective:       Patient ID: Jaswinder Reina is a 78 y.o. male.    Chief Complaint: Hypertension, Gastroesophageal Reflux, and Thyroid Problem    Mr. Pedro Luis Reina is a 78-year-old gentleman who comes for 6 months follow-up.    UNDERLYING MEDICAL ISSUES ARE AS BELOW:-    1.-HYPERTENSION CURRENTLY ON LOSARTAN 50 MG   2.-HYPOTHYROIDISM CURRENTLY ON LEVOTHYROXINE 100 MCG   3.-CHRONIC KIDNEY DISEASE WITH RECENT CREATININE REACHING UP TO 2.2.  Some rise in BUN  also.    Overall he is doing okay.  Mild sense of fatigue and he tries to keeps himself active and plays golf which evidently and apparently removes his fatigue.    He is also known to have addisonian anemia.    Recent labs have shown a somewhat more elevated creatinine of 2.0 with elevated BUN.  Electrolytes are normal.  Thyroid test is normal.  Urine microalbumin is normal.    In past a CT scan of his abdomen was reviewed which had shown bilateral cysts the largest being 4 cm.  Overall kidney size was considered to be in acceptable range.  This did not indicate adult polycystic kidney disease.  Prostate was normal in size.  PLEASE NOTE THAT THE CREATININE LEVEL WAS 1.2 IN 2002 AND IT TRAVIS UP GRADUALLY TO 1.5 IN 2006.  THIS HAS BEEN REVIEWED FROM HIS LABS AS DOCUMENTED IN THE EPIC SYSTEM.  This was reviewed with the patient himself also.    His GFR has come less than 30.  Recent PTH as ordered by Dr. Sorin Ricardo was within normal range.    He would a kidney ultrasound ordered which showed slightly smaller size kidneys as compared to normal and with some simple cysts.      Past visit without affiliate nurse practitioner Ms. Tommy Corcoran, [id number]-[id number] has been noted for left lower extremity swelling and discomfort.  Ultrasound was negative for blood clot and the differential diagnosis include chronic venous insufficiency, varicose veins, musculoskeletal problems.    His leg pain has dissipated now.  Cause of leg pain is unclear at this point.    In past his  pantoprazole intake was twice a day.  I was concerned about potential side effects on kidney and I would advised him to cut down to once a day.  He did that.  He is doing okay with that.  He is gradually further cutting down on the pantoprazole.      Hypertension  This is a chronic problem. The current episode started more than 1 year ago. The problem is unchanged. The problem is uncontrolled. Pertinent negatives include no anxiety, chest pain, headaches, malaise/fatigue, neck pain, palpitations or peripheral edema. Risk factors for coronary artery disease include sedentary lifestyle. Past treatments include angiotensin blockers. Identifiable causes of hypertension include a thyroid problem.   Follow-up  Associated symptoms include fatigue. Pertinent negatives include no abdominal pain, arthralgias, chest pain, fever, headaches, joint swelling, neck pain, vomiting or weakness.   Thyroid Problem  Presents for follow-up visit. Symptoms include fatigue. Patient reports no anxiety, cold intolerance, constipation, diarrhea, hair loss, hoarse voice or palpitations. The symptoms have been stable.   Gastroesophageal Reflux  He reports no abdominal pain, no chest pain, no hoarse voice or no wheezing. This is a recurrent problem. The current episode started more than 1 year ago. Associated symptoms include fatigue. He has tried a PPI (Taking pantoprazole to per day.) for the symptoms. The treatment provided moderate relief.       Past Medical History:   Diagnosis Date    GERD (gastroesophageal reflux disease)     History of colonoscopy 6/12/2013    The entire colon was normal. Patient was recommended surveillance colonoscopy in 5 years.    History of endoscopy 3/22/2021    Upper GI endoscopy on 03/22/2021 by Dr. Ozzie Chan.  Examined esophagus was normal.  Biopsies were taken.  A medium-size hiatal hernia was present.  Stomach was normal.  The examined duodenum was normal.  Await pathology results.  Use Protonix 40 mg  b.i.d..    Hyperlipidemia     Hypertension     Hypothyroidism      Social History     Socioeconomic History    Marital status: Single    Number of children: 0   Occupational History    Occupation:  [employer] retd   Tobacco Use    Smoking status: Former     Current packs/day: 0.00     Average packs/day: 2.0 packs/day for 11.0 years (22.0 ttl pk-yrs)     Types: Cigarettes     Start date: 1962     Quit date: 1973     Years since quittin.8    Smokeless tobacco: Never   Substance and Sexual Activity    Alcohol use: Yes     Alcohol/week: 1.0 - 2.0 standard drink of alcohol     Types: 1 - 2 Glasses of wine per week     Comment: occ    Drug use: No    Sexual activity: Not Currently     Partners: Female     Comment: Not sexually active     Social Determinants of Health     Financial Resource Strain: Low Risk  (10/6/2023)    Overall Financial Resource Strain (CARDIA)     Difficulty of Paying Living Expenses: Not hard at all   Food Insecurity: No Food Insecurity (10/6/2023)    Hunger Vital Sign     Worried About Running Out of Food in the Last Year: Never true     Ran Out of Food in the Last Year: Never true   Transportation Needs: No Transportation Needs (10/6/2023)    PRAPARE - Transportation     Lack of Transportation (Medical): No     Lack of Transportation (Non-Medical): No   Physical Activity: Sufficiently Active (10/6/2023)    Exercise Vital Sign     Days of Exercise per Week: 4 days     Minutes of Exercise per Session: 40 min   Stress: No Stress Concern Present (10/6/2023)    [place] [employer] - Occupational Stress Questionnaire     Feeling of Stress : Not at all   Social Connections: Moderately Integrated (10/6/2023)    Social Connection and Isolation Panel [NHANES]     Frequency of Communication with Friends and Family: Once a week     Frequency of Social Gatherings with Friends and Family: Three times a week     Attends Spiritism Services: 1 to 4 times per year      "Active Member of Clubs or Organizations: Yes     Attends Club or Organization Meetings: More than 4 times per year     Marital Status: Never    Housing Stability: Low Risk  (10/6/2023)    Housing Stability Vital Sign     Unable to Pay for Housing in the Last Year: No     Number of Places Lived in the Last Year: 1     Unstable Housing in the Last Year: No     Past Surgical History:   Procedure Laterality Date    ADENOIDECTOMY      COLONOSCOPY      TONSILLECTOMY       Family History   Problem Relation Age of Onset    Pneumonia Mother     COPD Mother         smoker    Hypertension Father     Suicide Father        Review of Systems   Constitutional:  Positive for fatigue. Negative for activity change, fever, malaise/fatigue and unexpected weight change.   HENT:  Negative for hearing loss, hoarse voice, rhinorrhea and trouble swallowing.    Eyes:  Negative for discharge and visual disturbance.   Respiratory:  Negative for chest tightness and wheezing.    Cardiovascular:  Negative for chest pain and palpitations.   Gastrointestinal:  Negative for abdominal pain, blood in stool, constipation, diarrhea and vomiting.   Endocrine: Negative for cold intolerance, polydipsia and polyuria.   Genitourinary:  Negative for difficulty urinating, hematuria and urgency.   Musculoskeletal:  Negative for arthralgias, joint swelling and neck pain.   Neurological:  Negative for weakness and headaches.   Psychiatric/Behavioral:  Negative for confusion and dysphoric mood. The patient is not nervous/anxious.          Objective:      Blood pressure 124/65, pulse 65, height 5' 9" (1.753 m), weight 66.7 kg (147 lb). Body mass index is 21.71 kg/m².  Physical Exam  Vitals and nursing note reviewed.   Constitutional:       General: He is not in acute distress.     Appearance: Normal appearance. He is well-developed. He is not ill-appearing, toxic-appearing or diaphoretic.      Comments: BMI is 21.71   HENT:      Head: Normocephalic and " atraumatic.      Mouth/Throat:      Palate: No mass.   Eyes:      General: No scleral icterus.     Conjunctiva/sclera: Conjunctivae normal.   Neck:      Thyroid: No thyromegaly.      Vascular: No JVD.      Trachea: No tracheal deviation.   Cardiovascular:      Rate and Rhythm: Normal rate and regular rhythm.      Heart sounds: Normal heart sounds. No murmur heard.     No friction rub. No gallop.   Pulmonary:      Effort: Pulmonary effort is normal. No respiratory distress.      Breath sounds: Normal breath sounds.   Abdominal:      General: There is no distension.      Palpations: Abdomen is soft.      Tenderness: There is no abdominal tenderness.   Musculoskeletal:      Cervical back: Neck supple.      Thoracic back: Deformity (mild scoliosis with convexity towards the right.) present.        Back:       Right lower leg: No edema.      Left lower leg: No edema.      Comments: Back does not show any significant issues at this point.  Interestingly his shoe sizes 13 and foot size is 13 with a tiny slender frame of height of 5 ft 7 to 8 in.  Originally 5 ft 9 in.   Lymphadenopathy:      Cervical: No cervical adenopathy.   Skin:     General: Skin is warm and dry.      Findings: No lesion or rash.   Neurological:      Mental Status: He is alert. Mental status is at baseline.      Motor: Tremor present.      Comments: Mild But fine tremors on the outstretched extremities.  He is able to hold his cup of coffee fairly well in the morning.       Psychiatric:         Behavior: Behavior normal.           Assessment:             Benign essential hypertension  Comments:  Blood pressures are excellent.  Consider either stopping amlodipine or the hydrochlorothiazide component.  To discuss with Dr. Purcell also.    Acquired hypothyroidism  Comments:  Thyroid status is stable.  Continue to monitor thyroid tests at least once a year.  Orders:  -     TSH; Future; Expected date: 10/09/2023    Stage 3b chronic kidney  disease  Comments:  He continues to follow-up with Dr. Ricardo.  His GFR has gone less than 30 which technically puts him at stage IV but will check another lab before we jump to t  Orders:  -     Renal Function Panel; Future; Expected date: 10/09/2023  -     CBC Auto Differential; Future; Expected date: 10/09/2023  -     [place]/Lambda Free Light Chains,Ur, Quant  -     Protein Electrophoresis, Serum; Future; Expected date: 10/09/2023  -     Cancel: Protein electrophoresis, timed urine; Future  -     Protein electrophoresis, timed urine; Future    Gastroesophageal reflux disease with esophagitis without hemorrhage  Comments:  Reflux symptoms are fairly stable as long as he watches his diet.I have advised him to cut down on the pantoprazole and reserve it for bad reflux days.Pepcid ok    Easy bruising  Comments:  A small bruises noted in the left forearm.  He periodically gets these bruises.  He is not on aspirin.  Chronic kidney disease may cause this problem.  Orders:  -     CBC Auto Differential; Future; Expected date: 10/09/2023    Normocytic normochromic anemia  -     Ferritin; Future; Expected date: 10/09/2023  -     Iron and TIBC; Future; Expected date: 10/09/2023  -     [place]/Lambda Free Light Chains,Ur, Quant  -     Protein Electrophoresis, Serum; Future; Expected date: 10/09/2023  -     Cancel: Protein electrophoresis, timed urine; Future  -     Protein electrophoresis, timed urine; Future       Component Ref Range & Units 1 mo ago  (8/23/23) 5 mo ago  (5/12/23) 1 yr ago  (6/13/22) 1 yr ago  (12/10/21) 2 yr ago  (11/28/20) 3 yr ago  (8/26/20) 14 yr ago  (11/14/08)   WBC 3.90 - 12.70 K/uL 7.90  7.95  8.13  8.23  7.69  7.71  6.74 R    RBC 4.60 - 6.20 M/uL 3.62 Low   3.89 Low   4.12 Low   4.12 Low   4.31 Low   4.06 Low   4.38 Low     Hemoglobin 14.0 - 18.0 g/dL 11.7 Low   12.5 Low   13.2 Low   12.9 Low   13.4 Low   12.6 Low   13.9 Low  R    Hematocrit 40.0 - 54.0 % 35.1 Low   38.0 Low   39.9 Low   39.6 Low    41.5  38.6 Low   41.0      Lab Visit on 08/23/2023   Component Date Value Ref Range Status    Protein, Urine Random 08/23/2023 12  6 - 15 mg/dL Final    Magnesium 08/23/2023 2.1  1.6 - 2.6 mg/dL Final    WBC 08/23/2023 7.90  3.90 - 12.70 K/uL Final    RBC 08/23/2023 3.62 (L)  4.60 - 6.20 M/uL Final    Hemoglobin 08/23/2023 11.7 (L)  14.0 - 18.0 g/dL Final    Hematocrit 08/23/2023 35.1 (L)  40.0 - 54.0 % Final    MCV 08/23/2023 97  82 - 98 fL Final    MCH 08/23/2023 32.3 (H)  27.0 - 31.0 pg Final    MCHC 08/23/2023 33.3  32.0 - 36.0 g/dL Final    RDW 08/23/2023 12.9  11.5 - 14.5 % Final    Platelets 08/23/2023 380  150 - 450 K/uL Final    MPV 08/23/2023 9.6  9.2 - 12.9 fL Final    PTH, Intact 08/23/2023 43.0  9.0 - 77.0 pg/mL Final    Glucose 08/23/2023 85  70 - 110 mg/dL Final    Sodium 08/23/2023 134 (L)  136 - 145 mmol/L Final    Potassium 08/23/2023 4.2  3.5 - 5.1 mmol/L Final    Chloride 08/23/2023 101  95 - 110 mmol/L Final    CO2 08/23/2023 26  23 - 29 mmol/L Final    BUN 08/23/2023 41 (H)  8 - 23 mg/dL Final    Calcium 08/23/2023 9.0  8.7 - 10.5 mg/dL Final    Creatinine 08/23/2023 2.2 (H)  0.5 - 1.4 mg/dL Final    Albumin 08/23/2023 3.9  3.5 - 5.2 g/dL Final    Phosphorus 08/23/2023 3.7  2.7 - 4.5 mg/dL Final    eGFR 08/23/2023 29.9 (A)  >60 mL/min/1.73 m^2 Final    Anion Gap 08/23/2023 7 (L)  8 - 16 mmol/L Final    Uric Acid 08/23/2023 5.9  3.4 - 7.0 mg/dL Final    Specimen UA 08/23/2023 Urine, Clean Catch   Final    Color, UA 08/23/2023 Yellow  Yellow, Straw, Candace Final    Appearance, UA 08/23/2023 Clear  Clear Final    pH, UA 08/23/2023 6.0  5.0 - 8.0 Final    Specific Gravity, UA 08/23/2023 1.015  1.005 - 1.030 Final    Protein, UA 08/23/2023 Negative  Negative Final    Glucose, UA 08/23/2023 Negative  Negative Final    Ketones, UA 08/23/2023 Negative  Negative Final    Bilirubin (UA) 08/23/2023 Negative  Negative Final    Occult Blood UA 08/23/2023 Negative  Negative Final    Nitrite, UA 08/23/2023  Negative  Negative Final    Urobilinogen, UA 08/23/2023 Negative  Negative EU/dL Final    Leukocytes, UA 08/23/2023 Negative  Negative Final        The aorta and IVC are normal in caliber.  Right kidney measures 8.6 x 4.4 x 4.1 cm. There is no hydronephrosis. There is a 3.8 x 4.3 x 2.8 cm simple right renal cysts.     The left kidney measures 9.8 x 4.8 x 6.5 cm without hydronephrosis. There are multiple left renal cysts the largest measuring 4.5 cm arising from the lower pole.  The bladder is normal.     IMPRESSION: Stable bilateral simple renal cysts     No hydronephrosis     Electronically signed by:  Lavonne Jeffery MD  8/21/2023 9:27 AM CDT Workstation: [id number]           Specimen Collected: 08/21/23 08:55 Last Resulted: 08/21/23 09:27             Plan:           Benign essential hypertension  Comments:  Blood pressures are excellent.  Consider either stopping amlodipine or the hydrochlorothiazide component.  To discuss with Dr. Purcell also.    Acquired hypothyroidism  Comments:  Thyroid status is stable.  Continue to monitor thyroid tests at least once a year.  Orders:  -     TSH; Future; Expected date: 10/09/2023    Stage 3b chronic kidney disease  Comments:  He continues to follow-up with Dr. Ricardo.  His GFR has gone less than 30 which technically puts him at stage IV but will check another lab before we jump to t  Orders:  -     Renal Function Panel; Future; Expected date: 10/09/2023  -     CBC Auto Differential; Future; Expected date: 10/09/2023  -     [place]/Lambda Free Light Chains,Ur, Quant  -     Protein Electrophoresis, Serum; Future; Expected date: 10/09/2023  -     Cancel: Protein electrophoresis, timed urine; Future  -     Protein electrophoresis, timed urine; Future    Gastroesophageal reflux disease with esophagitis without hemorrhage  Comments:  Reflux symptoms are fairly stable as long as he watches his diet.I have advised him to cut down on the pantoprazole and reserve it for bad reflux  days.Pepcid ok    Easy bruising  Comments:  A small bruises noted in the left forearm.  He periodically gets these bruises.  He is not on aspirin.  Chronic kidney disease may cause this problem.  Orders:  -     CBC Auto Differential; Future; Expected date: 10/09/2023    Normocytic normochromic anemia  -     Ferritin; Future; Expected date: 10/09/2023  -     Iron and TIBC; Future; Expected date: 10/09/2023  -     [place]/Lambda Free Light Chains,Ur, Quant  -     Protein Electrophoresis, Serum; Future; Expected date: 10/09/2023  -     Cancel: Protein electrophoresis, timed urine; Future  -     Protein electrophoresis, timed urine; Future      Patient's medical conditions have been noted.      Chronic kidney disease and anemia has been noted and though he does not have any pain or protein urea, it is better to check for any potential light chain disease which can affect the kidney and cause anemia like multiple myeloma.  Labs ordered.  Discussed with Dr. Akilah newman.    He also has easy bruising though he is not on any aspirin.  No thrombocytopenia noted.    Renal protection measures including remaining hydrated, avoiding NSAIDs and unnecessary antibiotics discussed.  Periodic monitoring especially in conjunction with Nephrology also important.      Appropriate seasonal immunizations discussed and he is updated on most of the vaccinations.    If no major changes and results of recent tests ordered did not show any major changes will see him back in 4-6 months time.  Continue with COVID precautions.      Spent oziel 34 minutes with patient which involved review of pts medical conditions, labs, medications and with 50% of time face-to-face discussion about medical problems, management and any applicable changes.          Current Outpatient Medications:     amLODIPine (NORVASC) 5 MG tablet, TAKE 1 TABLET(5 MG) BY MOUTH EVERY MORNING, Disp: 90 tablet, Rfl: 2    levothyroxine (SYNTHROID) 100 MCG tablet, TAKE 1  TABLET(100 MCG) BY MOUTH BEFORE BREAKFAST, Disp: 90 tablet, Rfl: 2    losartan-hydrochlorothiazide 50-12.5 mg (HYZAAR) 50-12.5 mg per tablet, 1 tablet Daily., Disp: , Rfl:     pantoprazole (PROTONIX) 40 MG tablet, Take 40 mg by mouth once daily., Disp: , Rfl:

## 2024-01-02 RX ORDER — LEVOTHYROXINE SODIUM 100 UG/1
TABLET ORAL
Qty: 90 TABLET | Refills: 2 | Status: SHIPPED | OUTPATIENT
Start: 2024-01-02

## 2024-01-25 ENCOUNTER — LAB VISIT (OUTPATIENT)
Dept: LAB | Facility: HOSPITAL | Age: 79
End: 2024-01-25
Attending: STUDENT IN AN ORGANIZED HEALTH CARE EDUCATION/TRAINING PROGRAM
Payer: COMMERCIAL

## 2024-01-25 DIAGNOSIS — I10 ESSENTIAL HYPERTENSION, MALIGNANT: ICD-10-CM

## 2024-01-25 DIAGNOSIS — D63.1 ANEMIA OF CHRONIC RENAL FAILURE: ICD-10-CM

## 2024-01-25 DIAGNOSIS — N18.32 CHRONIC KIDNEY DISEASE (CKD) STAGE G3B/A1, MODERATELY DECREASED GLOMERULAR FILTRATION RATE (GFR) BETWEEN 30-44 ML/MIN/1.73 SQUARE METER AND ALBUMINURIA CREATININE RATIO LESS THAN 30 MG/G: Primary | ICD-10-CM

## 2024-01-25 DIAGNOSIS — N18.9 ANEMIA OF CHRONIC RENAL FAILURE: ICD-10-CM

## 2024-01-25 DIAGNOSIS — R80.9 PROTEINURIA: ICD-10-CM

## 2024-01-25 LAB
25(OH)D3+25(OH)D2 SERPL-MCNC: 55 NG/ML (ref 30–96)
ALBUMIN SERPL BCP-MCNC: 3.9 G/DL (ref 3.5–5.2)
ALBUMIN/CREAT UR: ABNORMAL UG/MG (ref 0–30)
ANION GAP SERPL CALC-SCNC: 7 MMOL/L (ref 8–16)
BILIRUB UR QL STRIP: NEGATIVE
BUN SERPL-MCNC: 45 MG/DL (ref 8–23)
CALCIUM SERPL-MCNC: 9 MG/DL (ref 8.7–10.5)
CHLORIDE SERPL-SCNC: 104 MMOL/L (ref 95–110)
CLARITY UR: CLEAR
CO2 SERPL-SCNC: 27 MMOL/L (ref 23–29)
COLOR UR: YELLOW
CREAT SERPL-MCNC: 2 MG/DL (ref 0.5–1.4)
CREAT UR-MCNC: <1 MG/DL (ref 23–375)
EST. GFR  (NO RACE VARIABLE): 33.3 ML/MIN/1.73 M^2
GLUCOSE SERPL-MCNC: 106 MG/DL (ref 70–110)
GLUCOSE UR QL STRIP: NEGATIVE
HGB UR QL STRIP: NEGATIVE
KETONES UR QL STRIP: NEGATIVE
LEUKOCYTE ESTERASE UR QL STRIP: NEGATIVE
MAGNESIUM SERPL-MCNC: 2 MG/DL (ref 1.6–2.6)
MICROALBUMIN UR DL<=1MG/L-MCNC: <7 UG/ML
NITRITE UR QL STRIP: NEGATIVE
PH UR STRIP: 6 [PH] (ref 5–8)
PHOSPHATE SERPL-MCNC: 4 MG/DL (ref 2.7–4.5)
POTASSIUM SERPL-SCNC: 4.6 MMOL/L (ref 3.5–5.1)
PROT UR QL STRIP: NEGATIVE
PTH-INTACT SERPL-MCNC: 70.7 PG/ML (ref 9–77)
RHEUMATOID FACT SERPL-ACNC: <10 IU/ML (ref 0–15)
SODIUM SERPL-SCNC: 138 MMOL/L (ref 136–145)
SP GR UR STRIP: 1.02 (ref 1–1.03)
URATE SERPL-MCNC: 6.2 MG/DL (ref 3.4–7)
URN SPEC COLLECT METH UR: NORMAL
UROBILINOGEN UR STRIP-ACNC: NEGATIVE EU/DL

## 2024-01-25 PROCEDURE — 86431 RHEUMATOID FACTOR QUANT: CPT | Performed by: STUDENT IN AN ORGANIZED HEALTH CARE EDUCATION/TRAINING PROGRAM

## 2024-01-25 PROCEDURE — 82306 VITAMIN D 25 HYDROXY: CPT | Performed by: STUDENT IN AN ORGANIZED HEALTH CARE EDUCATION/TRAINING PROGRAM

## 2024-01-25 PROCEDURE — 86038 ANTINUCLEAR ANTIBODIES: CPT | Performed by: STUDENT IN AN ORGANIZED HEALTH CARE EDUCATION/TRAINING PROGRAM

## 2024-01-25 PROCEDURE — 83735 ASSAY OF MAGNESIUM: CPT | Performed by: STUDENT IN AN ORGANIZED HEALTH CARE EDUCATION/TRAINING PROGRAM

## 2024-01-25 PROCEDURE — 82043 UR ALBUMIN QUANTITATIVE: CPT | Performed by: STUDENT IN AN ORGANIZED HEALTH CARE EDUCATION/TRAINING PROGRAM

## 2024-01-25 PROCEDURE — 84550 ASSAY OF BLOOD/URIC ACID: CPT | Performed by: STUDENT IN AN ORGANIZED HEALTH CARE EDUCATION/TRAINING PROGRAM

## 2024-01-25 PROCEDURE — 83970 ASSAY OF PARATHORMONE: CPT | Performed by: STUDENT IN AN ORGANIZED HEALTH CARE EDUCATION/TRAINING PROGRAM

## 2024-01-25 PROCEDURE — 80069 RENAL FUNCTION PANEL: CPT | Performed by: STUDENT IN AN ORGANIZED HEALTH CARE EDUCATION/TRAINING PROGRAM

## 2024-01-25 PROCEDURE — 81003 URINALYSIS AUTO W/O SCOPE: CPT | Performed by: STUDENT IN AN ORGANIZED HEALTH CARE EDUCATION/TRAINING PROGRAM

## 2024-01-28 LAB — ANA TITR SER IF: NEGATIVE {TITER}

## 2024-03-26 ENCOUNTER — LAB VISIT (OUTPATIENT)
Dept: LAB | Facility: HOSPITAL | Age: 79
End: 2024-03-26
Attending: INTERNAL MEDICINE
Payer: COMMERCIAL

## 2024-03-26 DIAGNOSIS — E03.9 ACQUIRED HYPOTHYROIDISM: ICD-10-CM

## 2024-03-26 DIAGNOSIS — N18.32 STAGE 3B CHRONIC KIDNEY DISEASE: ICD-10-CM

## 2024-03-26 DIAGNOSIS — R23.3 EASY BRUISING: ICD-10-CM

## 2024-03-26 DIAGNOSIS — D64.9 NORMOCYTIC NORMOCHROMIC ANEMIA: ICD-10-CM

## 2024-03-26 LAB
ALBUMIN SERPL BCP-MCNC: 4.1 G/DL (ref 3.5–5.2)
ANION GAP SERPL CALC-SCNC: 7 MMOL/L (ref 8–16)
BASOPHILS # BLD AUTO: 0.07 K/UL (ref 0–0.2)
BASOPHILS NFR BLD: 0.9 % (ref 0–1.9)
BUN SERPL-MCNC: 46 MG/DL (ref 8–23)
CALCIUM SERPL-MCNC: 9.3 MG/DL (ref 8.7–10.5)
CHLORIDE SERPL-SCNC: 104 MMOL/L (ref 95–110)
CO2 SERPL-SCNC: 27 MMOL/L (ref 23–29)
CREAT SERPL-MCNC: 2.2 MG/DL (ref 0.5–1.4)
DIFFERENTIAL METHOD BLD: ABNORMAL
EOSINOPHIL # BLD AUTO: 0.1 K/UL (ref 0–0.5)
EOSINOPHIL NFR BLD: 1.1 % (ref 0–8)
ERYTHROCYTE [DISTWIDTH] IN BLOOD BY AUTOMATED COUNT: 12.6 % (ref 11.5–14.5)
EST. GFR  (NO RACE VARIABLE): 29.7 ML/MIN/1.73 M^2
FERRITIN SERPL-MCNC: 39.7 NG/ML (ref 20–300)
GLUCOSE SERPL-MCNC: 81 MG/DL (ref 70–110)
HCT VFR BLD AUTO: 37.5 % (ref 40–54)
HGB BLD-MCNC: 12.3 G/DL (ref 14–18)
IMM GRANULOCYTES # BLD AUTO: 0.03 K/UL (ref 0–0.04)
IMM GRANULOCYTES NFR BLD AUTO: 0.4 % (ref 0–0.5)
IRON SERPL-MCNC: 132 UG/DL (ref 45–160)
LYMPHOCYTES # BLD AUTO: 1.8 K/UL (ref 1–4.8)
LYMPHOCYTES NFR BLD: 24.3 % (ref 18–48)
MCH RBC QN AUTO: 31.8 PG (ref 27–31)
MCHC RBC AUTO-ENTMCNC: 32.8 G/DL (ref 32–36)
MCV RBC AUTO: 97 FL (ref 82–98)
MONOCYTES # BLD AUTO: 1 K/UL (ref 0.3–1)
MONOCYTES NFR BLD: 13.7 % (ref 4–15)
NEUTROPHILS # BLD AUTO: 4.5 K/UL (ref 1.8–7.7)
NEUTROPHILS NFR BLD: 59.6 % (ref 38–73)
NRBC BLD-RTO: 0 /100 WBC
PHOSPHATE SERPL-MCNC: 4.2 MG/DL (ref 2.7–4.5)
PLATELET # BLD AUTO: 386 K/UL (ref 150–450)
PMV BLD AUTO: 9.4 FL (ref 9.2–12.9)
POTASSIUM SERPL-SCNC: 4.3 MMOL/L (ref 3.5–5.1)
RBC # BLD AUTO: 3.87 M/UL (ref 4.6–6.2)
SATURATED IRON: 48 % (ref 20–50)
SODIUM SERPL-SCNC: 138 MMOL/L (ref 136–145)
T4 FREE SERPL-MCNC: 1.11 NG/DL (ref 0.71–1.51)
TOTAL IRON BINDING CAPACITY: 276 UG/DL (ref 250–450)
TRANSFERRIN SERPL-MCNC: 197 MG/DL (ref 200–375)
TSH SERPL DL<=0.005 MIU/L-ACNC: 0.29 UIU/ML (ref 0.34–5.6)
WBC # BLD AUTO: 7.5 K/UL (ref 3.9–12.7)

## 2024-03-26 PROCEDURE — 84165 PROTEIN E-PHORESIS SERUM: CPT | Performed by: INTERNAL MEDICINE

## 2024-03-26 PROCEDURE — 82728 ASSAY OF FERRITIN: CPT | Performed by: INTERNAL MEDICINE

## 2024-03-26 PROCEDURE — 85025 COMPLETE CBC W/AUTO DIFF WBC: CPT | Performed by: INTERNAL MEDICINE

## 2024-03-26 PROCEDURE — 84443 ASSAY THYROID STIM HORMONE: CPT | Performed by: INTERNAL MEDICINE

## 2024-03-26 PROCEDURE — 84439 ASSAY OF FREE THYROXINE: CPT | Performed by: INTERNAL MEDICINE

## 2024-03-26 PROCEDURE — 80069 RENAL FUNCTION PANEL: CPT | Performed by: INTERNAL MEDICINE

## 2024-03-26 PROCEDURE — 83540 ASSAY OF IRON: CPT | Performed by: INTERNAL MEDICINE

## 2024-03-28 LAB
ALBUMIN SERPL ELPH-MCNC: 3.6 G/DL (ref 2.9–4.4)
ALBUMIN/GLOB SERPL: 1 {RATIO} (ref 0.7–1.7)
ALPHA1 GLOB SERPL ELPH-MCNC: 0.2 G/DL (ref 0–0.4)
ALPHA2 GLOB SERPL ELPH-MCNC: 0.8 G/DL (ref 0.4–1)
B-GLOBULIN SERPL ELPH-MCNC: 1 G/DL (ref 0.7–1.3)
GAMMA GLOB SERPL ELPH-MCNC: 1.4 G/DL (ref 0.4–1.8)
GLOBULIN SER CALC-MCNC: 3.5 G/DL (ref 2.2–3.9)
LABORATORY COMMENT REPORT: NORMAL
M PROTEIN SERPL ELPH-MCNC: NORMAL G/DL
PROT SERPL-MCNC: 7.1 G/DL (ref 6–8.5)

## 2024-04-01 ENCOUNTER — OFFICE VISIT (OUTPATIENT)
Dept: FAMILY MEDICINE | Facility: CLINIC | Age: 79
End: 2024-04-01
Payer: COMMERCIAL

## 2024-04-01 VITALS
BODY MASS INDEX: 22.22 KG/M2 | HEIGHT: 69 IN | WEIGHT: 150 LBS | DIASTOLIC BLOOD PRESSURE: 61 MMHG | SYSTOLIC BLOOD PRESSURE: 110 MMHG | HEART RATE: 80 BPM

## 2024-04-01 DIAGNOSIS — K21.00 GASTROESOPHAGEAL REFLUX DISEASE WITH ESOPHAGITIS WITHOUT HEMORRHAGE: ICD-10-CM

## 2024-04-01 DIAGNOSIS — I10 BENIGN ESSENTIAL HYPERTENSION: Primary | ICD-10-CM

## 2024-04-01 DIAGNOSIS — N18.32 STAGE 3B CHRONIC KIDNEY DISEASE: ICD-10-CM

## 2024-04-01 DIAGNOSIS — D64.9 NORMOCYTIC NORMOCHROMIC ANEMIA: Chronic | ICD-10-CM

## 2024-04-01 DIAGNOSIS — E03.9 ACQUIRED HYPOTHYROIDISM: ICD-10-CM

## 2024-04-01 PROCEDURE — 99999 PR PBB SHADOW E&M-EST. PATIENT-LVL III: CPT | Mod: PBBFAC,,, | Performed by: INTERNAL MEDICINE

## 2024-04-01 PROCEDURE — 1159F MED LIST DOCD IN RCRD: CPT | Mod: CPTII,S$GLB,, | Performed by: INTERNAL MEDICINE

## 2024-04-01 PROCEDURE — 3078F DIAST BP <80 MM HG: CPT | Mod: CPTII,S$GLB,, | Performed by: INTERNAL MEDICINE

## 2024-04-01 PROCEDURE — 1126F AMNT PAIN NOTED NONE PRSNT: CPT | Mod: CPTII,S$GLB,, | Performed by: INTERNAL MEDICINE

## 2024-04-01 PROCEDURE — 1101F PT FALLS ASSESS-DOCD LE1/YR: CPT | Mod: CPTII,S$GLB,, | Performed by: INTERNAL MEDICINE

## 2024-04-01 PROCEDURE — 3074F SYST BP LT 130 MM HG: CPT | Mod: CPTII,S$GLB,, | Performed by: INTERNAL MEDICINE

## 2024-04-01 PROCEDURE — 99214 OFFICE O/P EST MOD 30 MIN: CPT | Mod: S$GLB,,, | Performed by: INTERNAL MEDICINE

## 2024-04-01 PROCEDURE — 1160F RVW MEDS BY RX/DR IN RCRD: CPT | Mod: CPTII,S$GLB,, | Performed by: INTERNAL MEDICINE

## 2024-04-01 PROCEDURE — 3288F FALL RISK ASSESSMENT DOCD: CPT | Mod: CPTII,S$GLB,, | Performed by: INTERNAL MEDICINE

## 2024-04-01 RX ORDER — LEVOTHYROXINE SODIUM 88 UG/1
88 TABLET ORAL
Qty: 90 TABLET | Refills: 3 | Status: SHIPPED | OUTPATIENT
Start: 2024-04-01

## 2024-04-01 RX ORDER — AMLODIPINE BESYLATE 5 MG/1
TABLET ORAL
Qty: 90 TABLET | Refills: 3 | Status: SHIPPED | OUTPATIENT
Start: 2024-04-01

## 2024-04-01 NOTE — PROGRESS NOTES
Subjective:       Patient ID: Jaswinder Reina is a 79 y.o. male.    Chief Complaint: Hypertension, Follow-up, Thyroid Problem, and Chronic Kidney Disease    Mr. Pedro Luis Reina is a 79-year-old gentleman who comes for 6 months follow-up.    UNDERLYING MEDICAL ISSUES ARE AS BELOW:-    1.-HYPERTENSION CURRENTLY ON LOSARTAN 50 MG   2.-HYPOTHYROIDISM CURRENTLY ON LEVOTHYROXINE 100 MCG   3.-CHRONIC KIDNEY DISEASE WITH RECENT CREATININE REACHING UP TO 2.2.  Some rise in BUN  also.    I would run some labs including serum protein electrophoresis and it was negative for an M spike.  This test was run because I had some suspicion for multiple myeloma given his anemia and also chronic kidney disease.    His creatinine is 2.2 which is more or less the same as before.    Overall he is doing okay.  Mild sense of fatigue and he tries to keeps himself active and plays golf which evidently and apparently removes his fatigue.  Spring time is the best time for playing golf at this time because the temperature is not too hot and not to called and it is just fine.    He is also known to have addisonian anemia.    His nephrologist currently is Dr. Marcellus Morales.  Dr. Akilah Ricardo has relocated somewhere else.    In past a CT scan of his abdomen was reviewed which had shown bilateral cysts the largest being 4 cm.  Overall kidney size was considered to be in acceptable range.  This did not indicate adult polycystic kidney disease.  Prostate was normal in size.  PLEASE NOTE THAT THE CREATININE LEVEL WAS 1.2 IN 2002 AND IT TRAVIS UP GRADUALLY TO 1.5 IN 2006.  THIS HAS BEEN REVIEWED FROM HIS LABS AS DOCUMENTED IN THE EPIC SYSTEM.  This was reviewed with the patient himself also.    His GFR has come less than 30.  Recent PTH as ordered by Dr. Sorin Ricardo was within normal range.    He had a kidney ultrasound ordered which showed slightly smaller size kidneys as compared to normal and with some simple cysts.      Past visit with our  affiliate nurse practitioner Ms. Tommy Corcoran, [facility] has been noted for left lower extremity swelling and discomfort.  Ultrasound was negative for blood clot and the differential diagnosis include chronic venous insufficiency, varicose veins, musculoskeletal problems.    His leg pain has dissipated now.  Cause of leg pain was unclear at this point.    In past his pantoprazole intake was twice a day.  I was concerned about potential side effects on kidney and I would advised him to cut down to once a day.  He did that.  He is doing okay with that.  He is gradually further cutting down on the pantoprazole.  Please note that off late he has quit taking pantoprazole and is generally doing okay.    Hypertension  This is a chronic problem. The current episode started more than 1 year ago. The problem is unchanged. The problem is uncontrolled. Associated symptoms include neck pain (Neck pain continues to annoy him and his trusted phone is the culprit.). Pertinent negatives include no anxiety, chest pain, headaches, malaise/fatigue, palpitations or peripheral edema. Risk factors for coronary artery disease include sedentary lifestyle. Past treatments include angiotensin blockers. Identifiable causes of hypertension include a thyroid problem.   Follow-up  Associated symptoms include fatigue and neck pain (Neck pain continues to annoy him and his trusted phone is the culprit.). Pertinent negatives include no abdominal pain, arthralgias, chest pain, chills, coughing, fever, headaches, joint swelling, numbness, vomiting or weakness.   Thyroid Problem  Presents for follow-up visit. Symptoms include fatigue. Patient reports no anxiety, cold intolerance, constipation, diarrhea, hair loss, hoarse voice or palpitations. The symptoms have been stable.   Gastroesophageal Reflux  He reports no abdominal pain, no chest pain, no coughing, no hoarse voice or no wheezing. This is a recurrent problem. The current episode started more  than 1 year ago. Associated symptoms include fatigue. He has tried a PPI (Taking pantoprazole to per day.) for the symptoms. The treatment provided moderate relief.       Past Medical History:   Diagnosis Date    GERD (gastroesophageal reflux disease)     History of colonoscopy 2013    The entire colon was normal. Patient was recommended surveillance colonoscopy in 5 years.    History of endoscopy 3/22/2021    Upper GI endoscopy on 2021 by Dr. Ozzie Chan.  Examined esophagus was normal.  Biopsies were taken.  A medium-size hiatal hernia was present.  Stomach was normal.  The examined duodenum was normal.  Await pathology results.  Use Protonix 40 mg b.i.d..    Hyperlipidemia     Hypertension     Hypothyroidism      Social History     Socioeconomic History    Marital status: Single    Number of children: 0   Occupational History    Occupation:  [employer] retd   Tobacco Use    Smoking status: Former     Current packs/day: 0.00     Average packs/day: 2.0 packs/day for 11.0 years (22.0 ttl pk-yrs)     Types: Cigarettes     Start date: 1962     Quit date: 1973     Years since quittin.2    Smokeless tobacco: Never   Substance and Sexual Activity    Alcohol use: Yes     Alcohol/week: 1.0 - 2.0 standard drink of alcohol     Types: 1 - 2 Glasses of wine per week     Comment: occ    Drug use: No    Sexual activity: Not Currently     Partners: Female     Comment: Not sexually active     Social Determinants of Health     Financial Resource Strain: Low Risk  (3/29/2024)    Overall Financial Resource Strain (CARDIA)     Difficulty of Paying Living Expenses: Not hard at all   Food Insecurity: No Food Insecurity (3/29/2024)    Hunger Vital Sign     Worried About Running Out of Food in the Last Year: Never true     Ran Out of Food in the Last Year: Never true   Transportation Needs: No Transportation Needs (3/29/2024)    PRAPARE - Transportation     Lack of Transportation (Medical): No      Lack of Transportation (Non-Medical): No   Physical Activity: Sufficiently Active (3/29/2024)    Exercise Vital Sign     Days of Exercise per Week: 5 days     Minutes of Exercise per Session: 40 min   Stress: No Stress Concern Present (3/29/2024)    [employer] - Occupational Stress Questionnaire     Feeling of Stress : Not at all   Social Connections: Unknown (3/29/2024)    Social Connection and Isolation Panel [NHANES]     Frequency of Communication with Friends and Family: Once a week     Frequency of Social Gatherings with Friends and Family: Twice a week     Active Member of Clubs or Organizations: No     Attends Club or Organization Meetings: Never     Marital Status: Never    Housing Stability: Low Risk  (3/29/2024)    Housing Stability Vital Sign     Unable to Pay for Housing in the Last Year: No     Number of Places Lived in the Last Year: 1     Unstable Housing in the Last Year: No     Past Surgical History:   Procedure Laterality Date    ADENOIDECTOMY      COLONOSCOPY      TONSILLECTOMY       Family History   Problem Relation Age of Onset    Pneumonia Mother     COPD Mother         smoker    Hypertension Father     Suicide Father        Review of Systems   Constitutional:  Positive for fatigue. Negative for activity change, chills, fever, malaise/fatigue and unexpected weight change.   HENT:  Negative for hearing loss, hoarse voice, rhinorrhea and trouble swallowing.    Eyes:  Negative for discharge, redness and visual disturbance.        Seems to have some baggy lower eyelids.  I have advised him that this is not because of the losartan hydrochlorothiazide.  Consider some cool compresses.  Ensure that the sleep is good at night.  He does not seem have to  ocular allergies or conjunctival allergies.   Respiratory:  Negative for cough, chest tightness and wheezing.    Cardiovascular:  Negative for chest pain, palpitations and leg swelling.   Gastrointestinal:  Negative  "for abdominal distention, abdominal pain, blood in stool, constipation, diarrhea and vomiting.   Endocrine: Negative for cold intolerance, polydipsia and polyuria.   Genitourinary:  Negative for difficulty urinating, flank pain, hematuria and urgency.   Musculoskeletal:  Positive for neck pain (Neck pain continues to annoy him and his trusted phone is the culprit.). Negative for arthralgias and joint swelling.   Skin:  Negative for color change and wound.   Neurological:  Negative for speech difficulty, weakness, numbness and headaches.   Psychiatric/Behavioral:  Negative for agitation, confusion and dysphoric mood. The patient is not nervous/anxious.          Objective:      Blood pressure 110/61, pulse 80, height 5' 9" (1.753 m), weight 68 kg (150 lb). Body mass index is 22.15 kg/m².  Physical Exam  Vitals and nursing note reviewed.   Constitutional:       General: He is not in acute distress.     Appearance: Normal appearance. He is well-developed. He is not ill-appearing, toxic-appearing or diaphoretic.      Comments: BMI is 22.15    HENT:      Head: Normocephalic and atraumatic.      Mouth/Throat:      Palate: No mass.   Eyes:      General: No scleral icterus.     Conjunctiva/sclera: Conjunctivae normal.   Neck:      Thyroid: No thyromegaly.      Vascular: No JVD.      Trachea: No tracheal deviation.   Cardiovascular:      Rate and Rhythm: Normal rate and regular rhythm.      Heart sounds: Normal heart sounds. No murmur heard.     No friction rub. No gallop.   Pulmonary:      Effort: Pulmonary effort is normal. No respiratory distress.      Breath sounds: Normal breath sounds.   Abdominal:      General: There is no distension.      Palpations: Abdomen is soft.      Tenderness: There is no abdominal tenderness.   Musculoskeletal:      Cervical back: Neck supple.      Thoracic back: Deformity (mild scoliosis with convexity towards the right.) present.        Back:       Right lower leg: No edema.      Left lower " leg: No edema.      Comments: Back does not show any significant issues at this point.  Interestingly his shoe sizes 13 and foot size is 13 with a tiny slender frame of height of 5 ft 7 to 8 in.  Originally 5 ft 9 in.   Feet:      Comments: Size 13 shoes.  He attributes his large feet to his good understanding of life and issues.  Lymphadenopathy:      Cervical: No cervical adenopathy.   Skin:     General: Skin is warm and dry.      Findings: No lesion or rash.   Neurological:      Mental Status: He is alert. Mental status is at baseline.      Motor: Tremor present.      Comments: Mild But fine tremors on the outstretched extremities.  He is able to hold his cup of coffee fairly well in the morning.       Psychiatric:         Behavior: Behavior normal.           Assessment:       Lab Visit on 03/26/2024   Component Date Value Ref Range Status    TSH 03/26/2024 0.288 (L)  0.340 - 5.600 uIU/mL Final    Glucose 03/26/2024 81  70 - 110 mg/dL Final    Sodium 03/26/2024 138  136 - 145 mmol/L Final    Potassium 03/26/2024 4.3  3.5 - 5.1 mmol/L Final    Chloride 03/26/2024 104  95 - 110 mmol/L Final    CO2 03/26/2024 27  23 - 29 mmol/L Final    BUN 03/26/2024 46 (H)  8 - 23 mg/dL Final    Calcium 03/26/2024 9.3  8.7 - 10.5 mg/dL Final    Creatinine 03/26/2024 2.2 (H)  0.5 - 1.4 mg/dL Final    Albumin 03/26/2024 4.1  3.5 - 5.2 g/dL Final    Phosphorus 03/26/2024 4.2  2.7 - 4.5 mg/dL Final    eGFR 03/26/2024 29.7 (A)  >60 mL/min/1.73 m^2 Final    Anion Gap 03/26/2024 7 (L)  8 - 16 mmol/L Final    WBC 03/26/2024 7.50  3.90 - 12.70 K/uL Final    RBC 03/26/2024 3.87 (L)  4.60 - 6.20 M/uL Final    Hemoglobin 03/26/2024 12.3 (L)  14.0 - 18.0 g/dL Final    Hematocrit 03/26/2024 37.5 (L)  40.0 - 54.0 % Final    MCV 03/26/2024 97  82 - 98 fL Final    MCH 03/26/2024 31.8 (H)  27.0 - 31.0 pg Final    MCHC 03/26/2024 32.8  32.0 - 36.0 g/dL Final    RDW 03/26/2024 12.6  11.5 - 14.5 % Final    Platelets 03/26/2024 386  150 - 450 K/uL  Final    MPV 03/26/2024 9.4  9.2 - 12.9 fL Final    Immature Granulocytes 03/26/2024 0.4  0.0 - 0.5 % Final    Gran # (ANC) 03/26/2024 4.5  1.8 - 7.7 K/uL Final    Immature Grans (Abs) 03/26/2024 0.03  0.00 - 0.04 K/uL Final    Lymph # 03/26/2024 1.8  1.0 - 4.8 K/uL Final    Mono # 03/26/2024 1.0  0.3 - 1.0 K/uL Final    Eos # 03/26/2024 0.1  0.0 - 0.5 K/uL Final    Baso # 03/26/2024 0.07  0.00 - 0.20 K/uL Final    nRBC 03/26/2024 0  0 /100 WBC Final    Gran % 03/26/2024 59.6  38.0 - 73.0 % Final    Lymph % 03/26/2024 24.3  18.0 - 48.0 % Final    Mono % 03/26/2024 13.7  4.0 - 15.0 % Final    Eosinophil % 03/26/2024 1.1  0.0 - 8.0 % Final    Basophil % 03/26/2024 0.9  0.0 - 1.9 % Final    Differential Method 03/26/2024 Automated   Final    Ferritin 03/26/2024 39.7  20.0 - 300.0 ng/mL Final    Iron 03/26/2024 132  45 - 160 ug/dL Final    Transferrin 03/26/2024 197 (L)  200 - 375 mg/dL Final    TIBC 03/26/2024 276  250 - 450 ug/dL Final    Saturated Iron 03/26/2024 48  20 - 50 % Final    Total Protein 03/26/2024 7.1  6.0 - 8.5 g/dL Final    Albumin 03/26/2024 3.6  2.9 - 4.4 g/dL Final    Alpha-1 03/26/2024 0.2  0.0 - 0.4 g/dL Final    Alpha-2 03/26/2024 0.8  0.4 - 1.0 g/dL Final    Beta 03/26/2024 1.0  0.7 - 1.3 g/dL Final    Gamma 03/26/2024 1.4  0.4 - 1.8 g/dL Final    M-SPIKE, PROT ELECTRO 03/26/2024 Not Observed  Not Observed g/dL Final    Globulin, Total 03/26/2024 3.5  2.2 - 3.9 g/dL Final    A/G Ratio 03/26/2024 1.0  0.7 - 1.7 Final    Please Note: 03/26/2024 Comment   Final    Free T4 03/26/2024 1.11  0.71 - 1.51 ng/dL Final   Lab Visit on 01/25/2024   Component Date Value Ref Range Status    Specimen UA 01/25/2024 Urine, Clean Catch   Final    Color, UA 01/25/2024 Yellow  Yellow, Straw, Candace Final    Appearance, UA 01/25/2024 Clear  Clear Final    pH, UA 01/25/2024 6.0  5.0 - 8.0 Final    Specific Gravity, UA 01/25/2024 1.020  1.005 - 1.030 Final    Protein, UA 01/25/2024 Negative  Negative Final    Glucose,  UA 01/25/2024 Negative  Negative Final    Ketones, UA 01/25/2024 Negative  Negative Final    Bilirubin (UA) 01/25/2024 Negative  Negative Final    Occult Blood UA 01/25/2024 Negative  Negative Final    Nitrite, UA 01/25/2024 Negative  Negative Final    Urobilinogen, UA 01/25/2024 Negative  Negative EU/dL Final    Leukocytes, UA 01/25/2024 Negative  Negative Final    Microalbumin, Urine 01/25/2024 <7.0  <19.9 ug/mL Final    Creatinine, Urine 01/25/2024 <1.0 (L)  23.0 - 375.0 mg/dL Final    Microalb/Creat Ratio 01/25/2024 Unable to calculate  0.0 - 30.0 ug/mg Final    Rheumatoid Factor 01/25/2024 <10.0  0.0 - 15.0 IU/mL Final    MARIAH 01/25/2024 Negative   Final    Glucose 01/25/2024 106  70 - 110 mg/dL Final    Sodium 01/25/2024 138  136 - 145 mmol/L Final    Potassium 01/25/2024 4.6  3.5 - 5.1 mmol/L Final    Chloride 01/25/2024 104  95 - 110 mmol/L Final    CO2 01/25/2024 27  23 - 29 mmol/L Final    BUN 01/25/2024 45 (H)  8 - 23 mg/dL Final    Calcium 01/25/2024 9.0  8.7 - 10.5 mg/dL Final    Creatinine 01/25/2024 2.0 (H)  0.5 - 1.4 mg/dL Final    Albumin 01/25/2024 3.9  3.5 - 5.2 g/dL Final    Phosphorus 01/25/2024 4.0  2.7 - 4.5 mg/dL Final    eGFR 01/25/2024 33.3 (A)  >60 mL/min/1.73 m^2 Final    Anion Gap 01/25/2024 7 (L)  8 - 16 mmol/L Final    Magnesium 01/25/2024 2.0  1.6 - 2.6 mg/dL Final    Uric Acid 01/25/2024 6.2  3.4 - 7.0 mg/dL Final    PTH, Intact 01/25/2024 70.7  9.0 - 77.0 pg/mL Final    Vit D, 25-Hydroxy 01/25/2024 55  30 - 96 ng/mL Final       1. Benign essential hypertension  Comments:  Blood pressures are doing okay at this point.  Follows up with the kidney doctor also.  Overview:  Patient has long-standing hypertension. Previously he was on a combination of amlodipine and losartan but secondary to some leg swelling and edema, amlodipine was discontinued. Currently he is on losartan only.    Orders:  -     amLODIPine (NORVASC) 5 MG tablet; TAKE 1 TABLET(5 MG) BY MOUTH EVERY MORNING  Dispense: 90  tablet; Refill: 3    2. Acquired hypothyroidism  Comments:  He is taking his levothyroxine 100 mcg.  His TSH is below normal.  Reduce it to 88 mcg.  Check TSH in 6 months.  Overview:  Clinically euthyroid- 2.12.15    Orders:  -     levothyroxine (SYNTHROID) 88 MCG tablet; Take 1 tablet (88 mcg total) by mouth before breakfast.  Dispense: 90 tablet; Refill: 3  -     TSH; Future; Expected date: 10/01/2024    3. Stage 3b chronic kidney disease  Comments:  Continue to monitor renal functions.  Remain hydrated.  Avoid NSAIDs.    4. Gastroesophageal reflux disease with esophagitis without hemorrhage  Comments:  Used to be on pantoprazole and he is currently off it.  Good job.  Please watch your diet.    5. Normocytic normochromic anemia  Comments:  Addisonian anemia.  Screening for multiple myeloma was negative.           Hemoglobin 14.0 - 18.0 g/dL 12.3 Low  11.7 Low  12.5 Low  13.2 Low  12.9 Low  13.4 Low  12.6 Low    Hematocrit 40.0 - 54.0 % 37.5 Low  35.1 Low  38.0 Low  39.9 Low  39.6 Low  41.5 38.6 Low      Component Ref Range & Units 6 d ago  (3/26/24) 3 yr ago  (11/28/20) 3 yr ago  (10/14/20) 6 yr ago  (1/10/18) 7 yr ago  (1/6/17) 15 yr ago  (11/14/08) 16 yr ago  (8/28/07)   Total Protein 6.0 - 8.5 g/dL 7.1 7.1 R 7.5 R 7.2 R 6.8 R 7.0 R 7.0 R   Albumin 2.9 - 4.4 g/dL 3.6         Alpha-1 0.0 - 0.4 g/dL 0.2         Alpha-2 0.4 - 1.0 g/dL 0.8         Beta 0.7 - 1.3 g/dL 1.0         Gamma 0.4 - 1.8 g/dL 1.4         M-SPIKE, PROT ELECTRO Not Observed g/dL Not Observed         Globulin, Total 2.2 - 3.9 g/dL 3.5         A/G Ratio 0.7 - 1.7 1.0         Please Note:  Comment           BUN 8 - 23 mg/dL 46 High  45 High  41 High  40 High   37 High  34 High    Calcium 8.7 - 10.5 mg/dL 9.3 9.0 9.0 8.8  9.0 8.6 Low    Creatinine 0.5 - 1.4 mg/dL 2.2 High  2.0 High  2.2 High  2.0 High   2.0 High  1.9 High      Component Ref Range & Units 6 d ago 1 yr ago 2 yr ago 3 yr ago 4 yr ago 5 yr ago 6 yr ago   TSH 0.340 - 5.600 uIU/mL 0.288  Low  0.390 0.580 0.460 0.680 0.32 R 5.98 High      Plan:   Benign essential hypertension  Comments:  Blood pressures are doing okay at this point.  Follows up with the kidney doctor also.  Orders:  -     amLODIPine (NORVASC) 5 MG tablet; TAKE 1 TABLET(5 MG) BY MOUTH EVERY MORNING  Dispense: 90 tablet; Refill: 3    Acquired hypothyroidism  Comments:  He is taking his levothyroxine 100 mcg.  His TSH is below normal.  Reduce it to 88 mcg.  Check TSH in 6 months.  Orders:  -     levothyroxine (SYNTHROID) 88 MCG tablet; Take 1 tablet (88 mcg total) by mouth before breakfast.  Dispense: 90 tablet; Refill: 3  -     TSH; Future; Expected date: 10/01/2024    Stage 3b chronic kidney disease  Comments:  Continue to monitor renal functions.  Remain hydrated.  Avoid NSAIDs.    Gastroesophageal reflux disease with esophagitis without hemorrhage  Comments:  Used to be on pantoprazole and he is currently off it.  Good job.  Please watch your diet.    Normocytic normochromic anemia  Comments:  Addisonian anemia.  Screening for multiple myeloma was negative.        Follow up in about 6 months (around 10/1/2024), or if symptoms worsen or fail to improve, for Hypertension/lipids/CKD.      Current Outpatient Medications:     losartan-hydrochlorothiazide 50-12.5 mg (HYZAAR) 50-12.5 mg per tablet, 1 tablet Daily., Disp: , Rfl:     amLODIPine (NORVASC) 5 MG tablet, TAKE 1 TABLET(5 MG) BY MOUTH EVERY MORNING, Disp: 90 tablet, Rfl: 3    levothyroxine (SYNTHROID) 88 MCG tablet, Take 1 tablet (88 mcg total) by mouth before breakfast., Disp: 90 tablet, Rfl: 3    Kameron Giovana

## 2024-08-12 ENCOUNTER — LAB VISIT (OUTPATIENT)
Dept: LAB | Facility: HOSPITAL | Age: 79
End: 2024-08-12
Attending: NURSE PRACTITIONER
Payer: COMMERCIAL

## 2024-08-12 DIAGNOSIS — N18.32 CHRONIC KIDNEY DISEASE (CKD) STAGE G3B/A1, MODERATELY DECREASED GLOMERULAR FILTRATION RATE (GFR) BETWEEN 30-44 ML/MIN/1.73 SQUARE METER AND ALBUMINURIA CREATININE RATIO LESS THAN 30 MG/G: Primary | ICD-10-CM

## 2024-08-12 DIAGNOSIS — I10 ESSENTIAL HYPERTENSION, MALIGNANT: ICD-10-CM

## 2024-08-12 LAB
25(OH)D3+25(OH)D2 SERPL-MCNC: 49 NG/ML (ref 30–96)
ALBUMIN SERPL BCP-MCNC: 3.9 G/DL (ref 3.5–5.2)
ALBUMIN/CREAT UR: 4.4 UG/MG (ref 0–30)
ANION GAP SERPL CALC-SCNC: 8 MMOL/L (ref 8–16)
BASOPHILS # BLD AUTO: 0.07 K/UL (ref 0–0.2)
BASOPHILS NFR BLD: 0.9 % (ref 0–1.9)
BILIRUB UR QL STRIP: NEGATIVE
BUN SERPL-MCNC: 45 MG/DL (ref 8–23)
CALCIUM SERPL-MCNC: 8.8 MG/DL (ref 8.7–10.5)
CHLORIDE SERPL-SCNC: 105 MMOL/L (ref 95–110)
CLARITY UR: CLEAR
CO2 SERPL-SCNC: 26 MMOL/L (ref 23–29)
COLOR UR: YELLOW
CREAT SERPL-MCNC: 2.3 MG/DL (ref 0.5–1.4)
CREAT UR-MCNC: 175.3 MG/DL (ref 23–375)
CREAT UR-MCNC: 175.3 MG/DL (ref 23–375)
DIFFERENTIAL METHOD BLD: ABNORMAL
EOSINOPHIL # BLD AUTO: 0.2 K/UL (ref 0–0.5)
EOSINOPHIL NFR BLD: 2 % (ref 0–8)
ERYTHROCYTE [DISTWIDTH] IN BLOOD BY AUTOMATED COUNT: 12.4 % (ref 11.5–14.5)
EST. GFR  (NO RACE VARIABLE): 28.2 ML/MIN/1.73 M^2
GLUCOSE SERPL-MCNC: 121 MG/DL (ref 70–110)
GLUCOSE UR QL STRIP: NEGATIVE
HCT VFR BLD AUTO: 35.3 % (ref 40–54)
HGB BLD-MCNC: 11.6 G/DL (ref 14–18)
HGB UR QL STRIP: NEGATIVE
IMM GRANULOCYTES # BLD AUTO: 0.03 K/UL (ref 0–0.04)
IMM GRANULOCYTES NFR BLD AUTO: 0.4 % (ref 0–0.5)
KETONES UR QL STRIP: NEGATIVE
LEUKOCYTE ESTERASE UR QL STRIP: NEGATIVE
LYMPHOCYTES # BLD AUTO: 1.8 K/UL (ref 1–4.8)
LYMPHOCYTES NFR BLD: 23.6 % (ref 18–48)
MAGNESIUM SERPL-MCNC: 2 MG/DL (ref 1.6–2.6)
MCH RBC QN AUTO: 31.7 PG (ref 27–31)
MCHC RBC AUTO-ENTMCNC: 32.9 G/DL (ref 32–36)
MCV RBC AUTO: 96 FL (ref 82–98)
MICROALBUMIN UR DL<=1MG/L-MCNC: 7.8 UG/ML
MONOCYTES # BLD AUTO: 1 K/UL (ref 0.3–1)
MONOCYTES NFR BLD: 12.5 % (ref 4–15)
NEUTROPHILS # BLD AUTO: 4.6 K/UL (ref 1.8–7.7)
NEUTROPHILS NFR BLD: 60.6 % (ref 38–73)
NITRITE UR QL STRIP: NEGATIVE
NRBC BLD-RTO: 0 /100 WBC
PH UR STRIP: 5 [PH] (ref 5–8)
PHOSPHATE SERPL-MCNC: 3.3 MG/DL (ref 2.7–4.5)
PLATELET # BLD AUTO: 334 K/UL (ref 150–450)
PMV BLD AUTO: 9 FL (ref 9.2–12.9)
POTASSIUM SERPL-SCNC: 4.4 MMOL/L (ref 3.5–5.1)
PROT UR QL STRIP: NEGATIVE
PROT UR-MCNC: 23 MG/DL (ref 6–15)
PROT/CREAT UR: 0.13 MG/G{CREAT} (ref 0–0.2)
PTH-INTACT SERPL-MCNC: 78.4 PG/ML (ref 9–77)
RBC # BLD AUTO: 3.66 M/UL (ref 4.6–6.2)
SODIUM SERPL-SCNC: 139 MMOL/L (ref 136–145)
SP GR UR STRIP: 1.02 (ref 1–1.03)
URATE SERPL-MCNC: 6.8 MG/DL (ref 3.4–7)
URN SPEC COLLECT METH UR: NORMAL
UROBILINOGEN UR STRIP-ACNC: NEGATIVE EU/DL
WBC # BLD AUTO: 7.57 K/UL (ref 3.9–12.7)

## 2024-08-12 PROCEDURE — 84550 ASSAY OF BLOOD/URIC ACID: CPT | Performed by: NURSE PRACTITIONER

## 2024-08-12 PROCEDURE — 82043 UR ALBUMIN QUANTITATIVE: CPT | Performed by: NURSE PRACTITIONER

## 2024-08-12 PROCEDURE — 80069 RENAL FUNCTION PANEL: CPT | Performed by: NURSE PRACTITIONER

## 2024-08-12 PROCEDURE — 83970 ASSAY OF PARATHORMONE: CPT | Performed by: NURSE PRACTITIONER

## 2024-08-12 PROCEDURE — 82570 ASSAY OF URINE CREATININE: CPT | Performed by: NURSE PRACTITIONER

## 2024-08-12 PROCEDURE — 83735 ASSAY OF MAGNESIUM: CPT | Performed by: NURSE PRACTITIONER

## 2024-08-12 PROCEDURE — 81003 URINALYSIS AUTO W/O SCOPE: CPT | Performed by: NURSE PRACTITIONER

## 2024-08-12 PROCEDURE — 36415 COLL VENOUS BLD VENIPUNCTURE: CPT | Performed by: NURSE PRACTITIONER

## 2024-08-12 PROCEDURE — 85025 COMPLETE CBC W/AUTO DIFF WBC: CPT | Performed by: NURSE PRACTITIONER

## 2024-08-12 PROCEDURE — 82306 VITAMIN D 25 HYDROXY: CPT | Performed by: NURSE PRACTITIONER

## 2024-08-28 DIAGNOSIS — N18.32 STAGE 3B CHRONIC KIDNEY DISEASE: Primary | ICD-10-CM

## 2024-09-06 ENCOUNTER — HOSPITAL ENCOUNTER (OUTPATIENT)
Dept: CARDIOLOGY | Facility: HOSPITAL | Age: 79
Discharge: HOME | End: 2024-09-06
Attending: NURSE PRACTITIONER
Payer: COMMERCIAL

## 2024-09-06 VITALS — BODY MASS INDEX: 22.21 KG/M2 | HEIGHT: 69 IN | WEIGHT: 149.94 LBS

## 2024-09-06 DIAGNOSIS — N18.32 STAGE 3B CHRONIC KIDNEY DISEASE: ICD-10-CM

## 2024-09-06 LAB
AORTIC ROOT ANNULUS: 3.2 CM
AORTIC VALVE CUSP SEPERATION: 2.1 CM
APICAL FOUR CHAMBER EJECTION FRACTION: 69 %
APICAL TWO CHAMBER EJECTION FRACTION: 65 %
AV INDEX (PROSTH): 0.87
AV MEAN GRADIENT: 5 MMHG
AV PEAK GRADIENT: 9 MMHG
AV VALVE AREA BY VELOCITY RATIO: 2.75 CM²
AV VALVE AREA: 2.72 CM²
AV VELOCITY RATIO: 0.88
BSA FOR ECHO PROCEDURE: 1.82 M2
CV ECHO LV RWT: 0.45 CM
DOP CALC AO PEAK VEL: 1.52 M/S
DOP CALC AO VTI: 33.7 CM
DOP CALC LVOT AREA: 3.1 CM2
DOP CALC LVOT DIAMETER: 2 CM
DOP CALC LVOT PEAK VEL: 1.33 M/S
DOP CALC LVOT STROKE VOLUME: 91.69 CM3
DOP CALC MV VTI: 26.6 CM
DOP CALCLVOT PEAK VEL VTI: 29.2 CM
E WAVE DECELERATION TIME: 185 MSEC
E/A RATIO: 0.91
E/E' RATIO: 11.73 M/S
ECHO LV POSTERIOR WALL: 0.9 CM (ref 0.6–1.1)
FRACTIONAL SHORTENING: 23 % (ref 28–44)
INTERVENTRICULAR SEPTUM: 0.9 CM (ref 0.6–1.1)
IVC DIAMETER: 1.7 CM
LEFT ATRIUM AREA SYSTOLIC (APICAL 2 CHAMBER): 24.4 CM2
LEFT ATRIUM AREA SYSTOLIC (APICAL 4 CHAMBER): 16.1 CM2
LEFT ATRIUM SIZE: 3.2 CM
LEFT ATRIUM VOLUME INDEX MOD: 29.5 ML/M2
LEFT ATRIUM VOLUME MOD: 53.9 CM3
LEFT INTERNAL DIMENSION IN SYSTOLE: 3.1 CM (ref 2.1–4)
LEFT VENTRICLE DIASTOLIC VOLUME INDEX: 38.25 ML/M2
LEFT VENTRICLE DIASTOLIC VOLUME: 70 ML
LEFT VENTRICLE END DIASTOLIC VOLUME APICAL 2 CHAMBER: 88.6 ML
LEFT VENTRICLE END DIASTOLIC VOLUME APICAL 4 CHAMBER: 81.2 ML
LEFT VENTRICLE END SYSTOLIC VOLUME APICAL 2 CHAMBER: 81.7 ML
LEFT VENTRICLE END SYSTOLIC VOLUME APICAL 4 CHAMBER: 36 ML
LEFT VENTRICLE MASS INDEX: 60 G/M2
LEFT VENTRICLE SYSTOLIC VOLUME INDEX: 20.7 ML/M2
LEFT VENTRICLE SYSTOLIC VOLUME: 37.9 ML
LEFT VENTRICULAR INTERNAL DIMENSION IN DIASTOLE: 4 CM (ref 3.5–6)
LEFT VENTRICULAR MASS: 109.69 G
LV LATERAL E/E' RATIO: 11 M/S
LV SEPTAL E/E' RATIO: 12.57 M/S
LVED V (TEICH): 70 ML
LVES V (TEICH): 37.9 ML
LVOT MG: 4 MMHG
LVOT MV: 0.92 CM/S
MV MEAN GRADIENT: 2 MMHG
MV PEAK A VEL: 0.97 M/S
MV PEAK E VEL: 0.88 M/S
MV PEAK GRADIENT: 3 MMHG
MV VALVE AREA BY CONTINUITY EQUATION: 3.45 CM2
OHS CV RV/LV RATIO: 0.55 CM
OHS LV EJECTION FRACTION SIMPSONS BIPLANE MOD: 67 %
PISA MRMAX VEL: 5.02 M/S
PISA TR MAX VEL: 2.68 M/S
PV MV: 0.56 M/S
PV PEAK GRADIENT: 3 MMHG
PV PEAK VELOCITY: 0.81 M/S
RIGHT VENTRICULAR END-DIASTOLIC DIMENSION: 2.2 CM
RV TISSUE DOPPLER FREE WALL SYSTOLIC VELOCITY 1 (APICAL 4 CHAMBER VIEW): 15.1 CM/S
TDI LATERAL: 0.08 M/S
TDI SEPTAL: 0.07 M/S
TDI: 0.08 M/S
TR MAX PG: 29 MMHG
TRICUSPID ANNULAR PLANE SYSTOLIC EXCURSION: 2.52 CM
Z-SCORE OF LEFT VENTRICULAR DIMENSION IN END DIASTOLE: -2.37
Z-SCORE OF LEFT VENTRICULAR DIMENSION IN END SYSTOLE: -0.08

## 2024-09-06 PROCEDURE — 93306 TTE W/DOPPLER COMPLETE: CPT

## 2024-09-06 PROCEDURE — 93306 TTE W/DOPPLER COMPLETE: CPT | Mod: 26,,, | Performed by: INTERNAL MEDICINE

## 2024-09-10 LAB
AORTIC ROOT ANNULUS: 3.2 CM
AORTIC VALVE CUSP SEPERATION: 2.1 CM
APICAL FOUR CHAMBER EJECTION FRACTION: 69 %
APICAL TWO CHAMBER EJECTION FRACTION: 65 %
AV INDEX (PROSTH): 0.87
AV MEAN GRADIENT: 5 MMHG
AV PEAK GRADIENT: 9 MMHG
AV VALVE AREA BY VELOCITY RATIO: 2.75 CM²
AV VALVE AREA: 2.72 CM²
AV VELOCITY RATIO: 0.88
BSA FOR ECHO PROCEDURE: 1.82 M2
CV ECHO LV RWT: 0.45 CM
DOP CALC AO PEAK VEL: 1.52 M/S
DOP CALC AO VTI: 33.7 CM
DOP CALC LVOT AREA: 3.1 CM2
DOP CALC LVOT DIAMETER: 2 CM
DOP CALC LVOT PEAK VEL: 1.33 M/S
DOP CALC LVOT STROKE VOLUME: 91.69 CM3
DOP CALC MV VTI: 26.6 CM
DOP CALCLVOT PEAK VEL VTI: 29.2 CM
E WAVE DECELERATION TIME: 185 MSEC
E/A RATIO: 0.91
E/E' RATIO: 11.73 M/S
ECHO LV POSTERIOR WALL: 0.9 CM (ref 0.6–1.1)
FRACTIONAL SHORTENING: 23 % (ref 28–44)
INTERVENTRICULAR SEPTUM: 0.9 CM (ref 0.6–1.1)
IVC DIAMETER: 1.7 CM
LEFT ATRIUM AREA SYSTOLIC (APICAL 2 CHAMBER): 24.4 CM2
LEFT ATRIUM AREA SYSTOLIC (APICAL 4 CHAMBER): 16.1 CM2
LEFT ATRIUM SIZE: 3.2 CM
LEFT ATRIUM VOLUME INDEX MOD: 29.5 ML/M2
LEFT ATRIUM VOLUME MOD: 53.9 CM3
LEFT INTERNAL DIMENSION IN SYSTOLE: 3.1 CM (ref 2.1–4)
LEFT VENTRICLE DIASTOLIC VOLUME INDEX: 38.25 ML/M2
LEFT VENTRICLE DIASTOLIC VOLUME: 70 ML
LEFT VENTRICLE END DIASTOLIC VOLUME APICAL 2 CHAMBER: 88.6 ML
LEFT VENTRICLE END DIASTOLIC VOLUME APICAL 4 CHAMBER: 81.2 ML
LEFT VENTRICLE END SYSTOLIC VOLUME APICAL 2 CHAMBER: 81.7 ML
LEFT VENTRICLE END SYSTOLIC VOLUME APICAL 4 CHAMBER: 36 ML
LEFT VENTRICLE MASS INDEX: 60 G/M2
LEFT VENTRICLE SYSTOLIC VOLUME INDEX: 20.7 ML/M2
LEFT VENTRICLE SYSTOLIC VOLUME: 37.9 ML
LEFT VENTRICULAR INTERNAL DIMENSION IN DIASTOLE: 4 CM (ref 3.5–6)
LEFT VENTRICULAR MASS: 109.69 G
LV LATERAL E/E' RATIO: 11 M/S
LV SEPTAL E/E' RATIO: 12.57 M/S
LVED V (TEICH): 70 ML
LVES V (TEICH): 37.9 ML
LVOT MG: 4 MMHG
LVOT MV: 0.92 CM/S
MV MEAN GRADIENT: 2 MMHG
MV PEAK A VEL: 0.97 M/S
MV PEAK E VEL: 0.88 M/S
MV PEAK GRADIENT: 3 MMHG
MV VALVE AREA BY CONTINUITY EQUATION: 3.45 CM2
OHS CV RV/LV RATIO: 0.55 CM
OHS LV EJECTION FRACTION SIMPSONS BIPLANE MOD: 67 %
PISA MRMAX VEL: 5.02 M/S
PISA TR MAX VEL: 2.68 M/S
PV MV: 0.56 M/S
PV PEAK GRADIENT: 3 MMHG
PV PEAK VELOCITY: 0.81 M/S
RA PRESSURE ESTIMATED: 3 MMHG
RIGHT VENTRICULAR END-DIASTOLIC DIMENSION: 2.2 CM
RV TB RVSP: 6 MMHG
RV TISSUE DOPPLER FREE WALL SYSTOLIC VELOCITY 1 (APICAL 4 CHAMBER VIEW): 15.1 CM/S
TDI LATERAL: 0.08 M/S
TDI SEPTAL: 0.07 M/S
TDI: 0.08 M/S
TR MAX PG: 29 MMHG
TRICUSPID ANNULAR PLANE SYSTOLIC EXCURSION: 2.52 CM
TV REST PULMONARY ARTERY PRESSURE: 32 MMHG
Z-SCORE OF LEFT VENTRICULAR DIMENSION IN END DIASTOLE: -2.37
Z-SCORE OF LEFT VENTRICULAR DIMENSION IN END SYSTOLE: -0.08

## 2024-09-16 ENCOUNTER — LAB VISIT (OUTPATIENT)
Dept: LAB | Facility: HOSPITAL | Age: 79
End: 2024-09-16
Attending: INTERNAL MEDICINE
Payer: COMMERCIAL

## 2024-09-16 DIAGNOSIS — E03.9 ACQUIRED HYPOTHYROIDISM: ICD-10-CM

## 2024-09-16 LAB — TSH SERPL DL<=0.005 MIU/L-ACNC: 1.19 UIU/ML (ref 0.34–5.6)

## 2024-09-16 PROCEDURE — 36415 COLL VENOUS BLD VENIPUNCTURE: CPT | Performed by: INTERNAL MEDICINE

## 2024-09-16 PROCEDURE — 84443 ASSAY THYROID STIM HORMONE: CPT | Performed by: INTERNAL MEDICINE

## 2024-09-20 NOTE — PROGRESS NOTES
Subjective:       Patient ID: Jaswinder Reina is a 79 y.o. male.    Chief Complaint: Hypertension, Thyroid Problem, Chronic Kidney Disease, and Anemia    Mr. Pedro Luis Reina is a 79-year-old gentleman who comes for 4-6 months follow-up.    UNDERLYING MEDICAL ISSUES ARE AS BELOW:-     1.-hypertension currentlyAmlodipine 5 mg and losartan HCTZ 50/12.5  2.- hypothyroidism currently levothyroxine 88 mcg   3.- chronic kidney disease stage IIIB with the last creatinine being 2.2.  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      As far as CKD is concerned, he used to follow up with Dr. Sorin Ricardo who has relocated. (Dr Efrain Morales) I have advised him about    He did see Ms Roxy Mattver NP   Nephrology.  (Dr. Marcellus Morales)    Past workup included testing for serum protein electrophoresis which was negative for M spike.  This test was ordered. For concerns about potential multiple myeloma given his anemia chronic kidney disease    Overall he is doing okay.  Mild sense of fatigue and he tries to keeps himself active and plays golf which evidently and apparently removes his fatigue.   He finds playing golf during spring and fall the best given the moderation of climate and   Hot summer months the worst.        In past a CT scan of his abdomen was reviewed which had shown bilateral cysts the largest being 4 cm.  Overall kidney size was considered to be in acceptable range.  This did not indicate adult polycystic kidney disease.  Prostate was normal in size.  PLEASE NOTE THAT THE CREATININE LEVEL WAS 1.2 IN 2002 AND IT TRAVIS UP GRADUALLY TO 1.5 IN 2006.  THIS HAS BEEN REVIEWED FROM HIS LABS AS DOCUMENTED IN THE EPIC SYSTEM.  This was reviewed with the patient himself also.    His GFR has come less than 30.  Past PTH as ordered by Dr. Sorin Ricardo was within normal range.    He had a kidney ultrasound ordered which showed  slightly smaller size kidneys as compared to normal and with some simple cysts.      Past visit with our affiliate nurse practitioner MsAmy Tommy Jewell, BREANN has been noted for left lower extremity swelling and discomfort.  Ultrasound was negative for blood clot and the differential diagnosis include chronic venous insufficiency, varicose veins, musculoskeletal problems.    His leg pain has dissipated now.  Cause of leg pain was unclear at this point.    In past his pantoprazole intake was twice a day.  I was concerned about potential side effects on kidney and I would advised him to cut down to once a day.  He did that.  He is doing okay with that.  He is gradually further cutting down on the pantoprazole.  Please note that off late he has quit taking pantoprazole and is generally doing okay.    Hypertension  This is a chronic problem. The current episode started more than 1 year ago. The problem is unchanged. The problem is uncontrolled. Pertinent negatives include no anxiety, chest pain, headaches, malaise/fatigue, neck pain, palpitations or peripheral edema. Risk factors for coronary artery disease include sedentary lifestyle. Past treatments include angiotensin blockers. Identifiable causes of hypertension include a thyroid problem.   Follow-up  Associated symptoms include fatigue. Pertinent negatives include no abdominal pain, arthralgias, chest pain, chills, coughing, fever, headaches, joint swelling, neck pain, numbness, vomiting or weakness.   Thyroid Problem  Presents for follow-up visit. Symptoms include fatigue. Patient reports no anxiety, cold intolerance, constipation, diarrhea, hair loss, hoarse voice or palpitations. The symptoms have been stable.   Gastroesophageal Reflux  He reports no abdominal pain, no chest pain, no coughing, no hoarse voice or no wheezing. This is a recurrent problem. The current episode started more than 1 year ago. Associated symptoms include fatigue. He has tried a PPI  (Taking pantoprazole to per day.) for the symptoms. The treatment provided moderate relief.       Past Medical History:   Diagnosis Date    GERD (gastroesophageal reflux disease)     History of colonoscopy 2013    The entire colon was normal. Patient was recommended surveillance colonoscopy in 5 years.    History of endoscopy 3/22/2021    Upper GI endoscopy on 2021 by Dr. Ozzie Chan.  Examined esophagus was normal.  Biopsies were taken.  A medium-size hiatal hernia was present.  Stomach was normal.  The examined duodenum was normal.  Await pathology results.  Use Protonix 40 mg b.i.d..    Hyperlipidemia     Hypertension     Hypothyroidism      Social History     Socioeconomic History    Marital status: Single    Number of children: 0   Occupational History    Occupation:  [employer] retd   Tobacco Use    Smoking status: Former     Current packs/day: 0.00     Average packs/day: 2.0 packs/day for 11.0 years (22.0 ttl pk-yrs)     Types: Cigarettes     Start date: 1962     Quit date: 1973     Years since quittin.7    Smokeless tobacco: Never   Substance and Sexual Activity    Alcohol use: Yes     Alcohol/week: 1.0 - 2.0 standard drink of alcohol     Types: 1 - 2 Glasses of wine per week     Comment: occ    Drug use: No    Sexual activity: Not Currently     Partners: Female     Comment: Not sexually active     Social Determinants of Health     Financial Resource Strain: Low Risk  (3/29/2024)    Overall Financial Resource Strain (CARDIA)     Difficulty of Paying Living Expenses: Not hard at all   Food Insecurity: No Food Insecurity (3/29/2024)    Hunger Vital Sign     Worried About Running Out of Food in the Last Year: Never true     Ran Out of Food in the Last Year: Never true   Transportation Needs: No Transportation Needs (3/29/2024)    PRAPARE - Transportation     Lack of Transportation (Medical): No     Lack of Transportation (Non-Medical): No   Physical Activity: Sufficiently  "Active (3/29/2024)    Exercise Vital Sign     Days of Exercise per Week: 5 days     Minutes of Exercise per Session: 40 min   Stress: No Stress Concern Present (3/29/2024)    [employer] [place] [employer] - Occupational Stress Questionnaire     Feeling of Stress : Not at all   Housing Stability: Low Risk  (3/29/2024)    Housing Stability Vital Sign     Unable to Pay for Housing in the Last Year: No     Number of Places Lived in the Last Year: 1     Unstable Housing in the Last Year: No     Past Surgical History:   Procedure Laterality Date    ADENOIDECTOMY      COLONOSCOPY      TONSILLECTOMY       Family History   Problem Relation Name Age of Onset    Pneumonia Mother natalee Clayton Ran     COPD Mother natalee Clayton Ran         smoker    Hypertension Father Nico Reina     Suicide Father Nico Reina        Review of Systems   Constitutional:  Positive for fatigue. Negative for activity change, chills, fever, malaise/fatigue and unexpected weight change.   HENT:  Negative for hearing loss, hoarse voice, rhinorrhea and trouble swallowing.    Eyes:  Negative for discharge and visual disturbance.   Respiratory:  Negative for cough, chest tightness and wheezing.    Cardiovascular:  Negative for chest pain and palpitations.   Gastrointestinal:  Negative for abdominal pain, blood in stool, constipation, diarrhea and vomiting.   Endocrine: Negative for cold intolerance, polydipsia and polyuria.   Genitourinary:  Negative for difficulty urinating, hematuria and urgency.   Musculoskeletal:  Negative for arthralgias, joint swelling and neck pain.   Neurological:  Negative for weakness, numbness and headaches.   Psychiatric/Behavioral:  Negative for confusion and dysphoric mood. The patient is not nervous/anxious.          Objective:      Blood pressure (!) 120/59, pulse 76, height 5' 9" (1.753 m), weight 68.9 kg (152 lb). Body mass index is 22.45 kg/m².  Physical Exam  Vitals and " nursing note reviewed.   Constitutional:       General: He is not in acute distress.     Appearance: Normal appearance. He is well-developed. He is not ill-appearing, toxic-appearing or diaphoretic.      Comments: BMI is 22.15    HENT:      Head: Normocephalic and atraumatic.      Mouth/Throat:      Palate: No mass.   Eyes:      General: No scleral icterus.     Conjunctiva/sclera: Conjunctivae normal.   Neck:      Thyroid: No thyromegaly.      Vascular: No JVD.      Trachea: No tracheal deviation.   Cardiovascular:      Rate and Rhythm: Normal rate and regular rhythm.      Heart sounds: Normal heart sounds. No murmur heard.     No friction rub. No gallop.   Pulmonary:      Effort: Pulmonary effort is normal. No respiratory distress.      Breath sounds: Normal breath sounds.   Abdominal:      General: There is no distension.      Palpations: Abdomen is soft.      Tenderness: There is no abdominal tenderness.   Musculoskeletal:      Cervical back: Neck supple.      Thoracic back: Deformity (mild scoliosis with convexity towards the right.) present.      Right lower leg: No edema.      Left lower leg: No edema.      Comments: Back does not show any significant issues at this point.  Interestingly his shoe sizes 13 and foot size is 13 with a tiny slender frame of height of 5 ft 7 to 8 in.  Originally 5 ft 9 in.   Feet:      Comments: Size 13 shoes.  He attributes his large feet to his good understanding of life and issues.  Lymphadenopathy:      Cervical: No cervical adenopathy.   Skin:     General: Skin is warm and dry.      Findings: No lesion or rash.   Neurological:      Mental Status: He is alert. Mental status is at baseline.      Motor: Tremor present.      Comments:  Minimal fine tremors       Psychiatric:         Behavior: Behavior normal.         Assessment:       Lab Visit on 09/16/2024   Component Date Value Ref Range Status    TSH 09/16/2024 1.194  0.340 - 5.600 uIU/mL Final   Hospital Outpatient Visit on  09/06/2024   Component Date Value Ref Range Status    BSA 09/06/2024 1.82  m2 Final    Vallejo's Biplane MOD Ejection Fra* 09/06/2024 67  % Final    A2C EF 09/06/2024 65  % Final    A4C EF 09/06/2024 69  % Final    LVOT stroke volume 09/06/2024 91.69  cm3 Final    LVIDd 09/06/2024 4.00  3.5 - 6.0 cm Final    LV Systolic Volume 09/06/2024 37.90  mL Final    LV Systolic Volume Index 09/06/2024 20.7  mL/m2 Final    LVIDs 09/06/2024 3.10  2.1 - 4.0 cm Final    LV ESV A2C 09/06/2024 81.70  mL Final    LV Diastolic Volume 09/06/2024 70.00  mL Final    LV ESV A4C 09/06/2024 36.00  mL Final    LV Diastolic Volume Index 09/06/2024 38.25  mL/m2 Final    LV EDV A2C 09/06/2024 [id number]  mL Final    LV EDV A4C 09/06/2024 81.20  mL Final    Left Ventricular End Systolic Volu* 09/06/2024 37.90  mL Final    Left Ventricular End Diastolic Vol* 09/06/2024 70.00  mL Final    IVS 09/06/2024 0.90  0.6 - 1.1 cm Final    LVOT diameter 09/06/2024 2.00  cm Final    LVOT area 09/06/2024 3.1  cm2 Final    FS 09/06/2024 23 (A)  28 - 44 % Final    Left Ventricle Relative Wall Thick* 09/06/2024 0.45  cm Final    Posterior Wall 09/06/2024 0.90  0.6 - 1.1 cm Final    LV mass 09/06/2024 109.69  g Final    LV Mass Index 09/06/2024 60  g/m2 Final    MV Peak E Maykel 09/06/2024 0.88  m/s Final    TDI LATERAL 09/06/2024 0.08  m/s Final    TDI SEPTAL 09/06/2024 0.07  m/s Final    E/E' ratio 09/06/2024 11.73  m/s Final    MV Peak A Maykel 09/06/2024 0.97  m/s Final    TR Max Maykel 09/06/2024 2.68  m/s Final    E/A ratio 09/06/2024 0.91   Final    E wave deceleration time 09/06/2024 185.00  msec Final    LV SEPTAL E/E' RATIO 09/06/2024 12.57  m/s Final    LV LATERAL E/E' RATIO 09/06/2024 11.00  m/s Final    LVOT peak maykel 09/06/2024 1.33  m/s Final    Left Ventricular Outflow Tract [place]* 09/06/2024 0.92  cm/s Final    Left Ventricular Outflow Tract [place]* 09/06/2024 4.00  mmHg Final    RV S' 09/06/2024 15.10  cm/s Final    TAPSE 09/06/2024 2.52  cm Final     RV/LV Ratio 09/06/2024 0.55  cm Final    LA size 09/06/2024 3.20  cm Final    LA volume (mod) 09/06/2024 53.90  cm3 Final    LA Volume Index (Mod) 09/06/2024 29.5  mL/m2 Final    AV mean gradient 09/06/2024 5  mmHg Final    AV peak gradient 09/06/2024 9  mmHg Final    Ao peak alisa 09/06/2024 1.52  m/s Final    Ao VTI 09/06/2024 33.70  cm Final    LVOT peak VTI 09/06/2024 29.20  cm Final    AV valve area 09/06/2024 2.72  cm² Final    AV Velocity Ratio 09/06/2024 0.88   Final    AV index (prosthetic) 09/06/2024 0.87   Final    JOANNE by Velocity Ratio 09/06/2024 2.75  cm² Final    Mr max alisa 09/06/2024 5.02  m/s Final    MV mean gradient 09/06/2024 2  mmHg Final    MV peak gradient 09/06/2024 3  mmHg Final    MV valve area by continuity eq 09/06/2024 3.45  cm2 Final    MV VTI 09/06/2024 26.6  cm Final    Triscuspid Valve Regurgitation Pea* 09/06/2024 29  mmHg Final    PV PEAK VELOCITY 09/06/2024 0.81  m/s Final    PV peak gradient 09/06/2024 3  mmHg Final    Pulmonary Valve Mean Velocity 09/06/2024 0.56  m/s Final    Ao root annulus 09/06/2024 3.20  cm Final    IVC diameter 09/06/2024 1.70  cm Final    Mean e' 09/06/2024 0.08  m/s Final    ZLVIDS 09/06/2024 -0.08   Final    ZLVIDD 09/06/2024 -2.37   Final    LA area A4C 09/06/2024 16.10  cm2 Final    LA area A2C 09/06/2024 24.40  cm2 Final    RVDD 09/06/2024 2.20  cm Final    AORTIC VALVE CUSP SEPERATION 09/06/2024 2.10  cm Final    TV resting pulmonary artery pressu* 09/06/2024 32  mmHg Final    RV TB RVSP 09/06/2024 6  mmHg Final    Est. RA pres 09/06/2024 3  mmHg Final   Lab Visit on 08/12/2024   Component Date Value Ref Range Status    WBC 08/12/2024 7.57  3.90 - 12.70 K/uL Final    RBC 08/12/2024 3.66 (L)  4.60 - 6.20 M/uL Final    Hemoglobin 08/12/2024 11.6 (L)  14.0 - 18.0 g/dL Final    Hematocrit 08/12/2024 35.3 (L)  40.0 - 54.0 % Final    MCV 08/12/2024 96  82 - 98 fL Final    MCH 08/12/2024 31.7 (H)  27.0 - 31.0 pg Final    MCHC 08/12/2024 32.9  32.0 - 36.0 g/dL  Final    RDW 08/12/2024 12.4  11.5 - 14.5 % Final    Platelets 08/12/2024 334  150 - 450 K/uL Final    MPV 08/12/2024 9.0 (L)  9.2 - 12.9 fL Final    Immature Granulocytes 08/12/2024 0.4  0.0 - 0.5 % Final    Gran # (ANC) 08/12/2024 4.6  1.8 - 7.7 K/uL Final    Immature Grans (Abs) 08/12/2024 0.03  0.00 - 0.04 K/uL Final    Lymph # 08/12/2024 1.8  1.0 - 4.8 K/uL Final    Mono # 08/12/2024 1.0  0.3 - 1.0 K/uL Final    Eos # 08/12/2024 0.2  0.0 - 0.5 K/uL Final    Baso # 08/12/2024 0.07  0.00 - 0.20 K/uL Final    nRBC 08/12/2024 0  0 /100 WBC Final    Gran % 08/12/2024 60.6  38.0 - 73.0 % Final    Lymph % 08/12/2024 23.6  18.0 - 48.0 % Final    Mono % 08/12/2024 12.5  4.0 - 15.0 % Final    Eosinophil % 08/12/2024 2.0  0.0 - 8.0 % Final    Basophil % 08/12/2024 0.9  0.0 - 1.9 % Final    Differential Method 08/12/2024 Automated   Final    Glucose 08/12/2024 121 (H)  70 - 110 mg/dL Final    Sodium 08/12/2024 139  136 - 145 mmol/L Final    Potassium 08/12/2024 4.4  3.5 - 5.1 mmol/L Final    Chloride 08/12/2024 105  95 - 110 mmol/L Final    CO2 08/12/2024 26  23 - 29 mmol/L Final    BUN 08/12/2024 45 (H)  8 - 23 mg/dL Final    Calcium 08/12/2024 8.8  8.7 - 10.5 mg/dL Final    Creatinine 08/12/2024 2.3 (H)  0.5 - 1.4 mg/dL Final    Albumin 08/12/2024 3.9  3.5 - 5.2 g/dL Final    Phosphorus 08/12/2024 3.3  2.7 - 4.5 mg/dL Final    eGFR 08/12/2024 28.2 (A)  >60 mL/min/1.73 m^2 Final    Anion Gap 08/12/2024 8  8 - 16 mmol/L Final    Specimen UA 08/12/2024 Urine, Clean Catch   Final    Color, UA 08/12/2024 Yellow  Yellow, Straw, Candace Final    Appearance, UA 08/12/2024 Clear  Clear Final    pH, UA 08/12/2024 5.0  5.0 - 8.0 Final    Specific Gravity, UA 08/12/2024 1.020  1.005 - 1.030 Final    Protein, UA 08/12/2024 Negative  Negative Final    Glucose, UA 08/12/2024 Negative  Negative Final    Ketones, UA 08/12/2024 Negative  Negative Final    Bilirubin (UA) 08/12/2024 Negative  Negative Final    Occult Blood UA 08/12/2024  Negative  Negative Final    Nitrite, UA 08/12/2024 Negative  Negative Final    Urobilinogen, UA 08/12/2024 Negative  Negative EU/dL Final    Leukocytes, UA 08/12/2024 Negative  Negative Final    PTH, Intact 08/12/2024 78.4 (H)  9.0 - 77.0 pg/mL Final    Vit D, 25-Hydroxy 08/12/2024 49  30 - 96 ng/mL Final    Magnesium 08/12/2024 2.0  1.6 - 2.6 mg/dL Final    Uric Acid 08/12/2024 6.8  3.4 - 7.0 mg/dL Final    Protein, Urine Random 08/12/2024 23 (H)  6 - 15 mg/dL Final    Creatinine, Urine 08/12/2024 175.3  23.0 - 375.0 mg/dL Final    Prot/Creat Ratio, Urine 08/12/2024 0.13  0.00 - 0.20 Final    Microalbumin, Urine 08/12/2024 7.8  <19.9 ug/mL Final    Creatinine, Urine 08/12/2024 175.3  23.0 - 375.0 mg/dL Final    Microalb/Creat Ratio 08/12/2024 4.4  0.0 - 30.0 ug/mg Final       1. Benign essential hypertension  Comments:  Blood pressures are stable with losartan and amlodipine.  Consideration for Jardiance in view of CKD  Overview:  Patient has long-standing hypertension. Previously he was on a combination of amlodipine and losartan but secondary to some leg swelling and edema, amlodipine was discontinued. Currently he is on losartan only.      2. Acquired hypothyroidism  Comments:  currently on levothyroxine 88 mcg and TSH levels are within range.  Continue  With same levothyroxine at 88 mcg.  Overview:  Clinically euthyroid- 2.12.15      3. Stage 3b chronic kidney disease  Comments:  continue renal protection measures including avoiding NSAIDs, BP control, remaining hydrated.  Consider Jardiance.    4. Gastroesophageal reflux disease with esophagitis without hemorrhage  Comments:  patient has stopped pantoprazole.  He was taken get a high dose of 40 mg twice a day in past.    5. Systolic dysfunction without heart failure  Comments:  mildly borderline low normal systolic function on echocardiogram.  Mild global hypokinesis.  Gets short winded.  Bilateral edema.    6. Need for influenza vaccination  -     influenza  (adjuvanted) (Fluad) 45 mcg/0.5 mL IM vaccine (> or = 66 yo) 0.5 mL         COMPARISON: 11/28/2020    The aorta and IVC are normal in caliber.  Right kidney measures 8.6 x 4.4 x 4.1 cm. There is no hydronephrosis. There is a 3.8 x 4.3 x 2.8 cm simple right renal cysts.    The left kidney measures 9.8 x 4.8 x 6.5 cm without hydronephrosis. There are multiple left renal cysts the largest measuring 4.5 cm arising from the lower pole.  The bladder is normal.    IMPRESSION: Stable bilateral simple renal cysts    No hydronephrosis    Electronically signed by:  Lavonne Jeffery MD  8/21/2023 9:27 AM CDT Workstation: [id number]  Exam End: 08/21/23  9:09 AM     Specimen Collected: 08/21/23  8:55 AM     Renal MBD Evaluation  -ca and phos stable  -PTH 70, Vit D 55 (Jan 2024)  Summary -echocardiogram done on 08/28/2024.  Borderline low normal ejection fraction.  Mild global hypokinesis.  Normal diastolic function.  Show Result Comparison     Left Ventricle: The left ventricle is normal in size. Normal wall thickness. Mild global hypokinesis present. There is low normal systolic function with a visually estimated ejection fraction of 50 - 55%. There is normal diastolic function.    Right Ventricle: Normal right ventricular cavity size. Wall thickness is normal. Systolic function is normal.    Mitral Valve: There is mild regurgitation with a centrally directed jet.    Tricuspid Valve: There is mild regurgitation with a centrally directed jet.    Pulmonary Artery: There is borderline elevated pulmonary hypertension. The estimated pulmonary artery systolic pressure is 32 mmHg.    IVC/SVC: Normal venous pressure at 3 mmHg.     Component Ref Range & Units 1 mo ago  (8/12/24) 7 mo ago  (1/25/24) 1 yr ago  (8/23/23) 1 yr ago  (5/12/23) 2 yr ago  (6/13/22)   PTH, Intact 9.0 - 77.0 pg/mL 78.4 High  70.7 43.0 49.1 50.0                Component Ref Range & Units 1 mo ago  (8/12/24) 5 mo ago  (3/26/24) 1 yr ago  (8/23/23) 1 yr  ago  (5/12/23) 2 yr ago  (6/13/22) 2 yr ago  (12/10/21) 3 yr ago  (11/28/20)   WBC 3.90 - 12.70 K/uL 7.57 7.50 7.90 7.95 8.13 8.23 7.69   RBC 4.60 - 6.20 M/uL 3.66 Low  3.87 Low  3.62 Low  3.89 Low  4.12 Low  4.12 Low  4.31 Low    Hemoglobin 14.0 - 18.0 g/dL 11.6 Low  12.3 Low  11.7 Low  12.5 Low  13.2 Low  12.9 Low  13.4 Low    Hematocrit 40.0 - 54.0 % 35.3 Low  37.5 Low  35.1 Low  38.0 Low  39.9 Low  39.6 Low  41.5        BUN 8 - 23 mg/dL 45 High  46 High  45 High  41 High  40 High   37 High    Calcium 8.7 - 10.5 mg/dL 8.8 9.3 9.0 9.0 8.8  9.0   Creatinine 0.5 - 1.4 mg/dL 2.3 High  2.2 High  2.0 High  2.2 High  2.0 High   2.0 High      Component Ref Range & Units 7 d ago 6 mo ago 1 yr ago 2 yr ago 3 yr ago 4 yr ago 6 yr ago   TSH 0.340 - 5.600 uIU/mL 1.194 0.288 Low  0.390 0.580 0.460 0.680 0.32 R     Plan:   Benign essential hypertension  Comments:  Blood pressures are stable with losartan and amlodipine.  Consideration for Jardiance in view of CKD    Acquired hypothyroidism  Comments:  currently on levothyroxine 88 mcg and TSH levels are within range.  Continue  With same levothyroxine at 88 mcg.    Stage 3b chronic kidney disease  Comments:  continue renal protection measures including avoiding NSAIDs, BP control, remaining hydrated.  Consider Jardiance.    Gastroesophageal reflux disease with esophagitis without hemorrhage  Comments:  patient has stopped pantoprazole.  He was taken get a high dose of 40 mg twice a day in past.    Systolic dysfunction without heart failure  Comments:  mildly borderline low normal systolic function on echocardiogram.  Mild global hypokinesis.  Gets short winded.  Bilateral edema.    Need for influenza vaccination  -     influenza (adjuvanted) (Fluad) 45 mcg/0.5 mL IM vaccine (> or = 66 yo) 0.5 mL        Patient's recent medical updates including the results of echocardiogram which might bearing on his overall sense of exercise tolerance has been reviewed.      CKD remained  stable with recent creatinine up to 2.3 and beginnings of secondary hyperparathyroidism.    Thyroid levels are fairly stable with current dosage of 88 mcg of levothyroxine.      Mild anemia has been noted which I suspect is secondary to chronic CKD but not bad.      Blood pressures are stable currently with losartan hydrochlorothiazide and amlodipine.  Consideration for Jardiance pending at this point. I have written a message to him to discuss with his Nephrology t      Following instructions has been given  To help protect your kidneys, please follow these simple steps:  Stay Hydrated: Drink plenty of water every day unless your doctor tells you otherwise.  Eat Healthy: Choose foods low in salt and protein. Avoid processed foods and fast food.  Monitor Blood Pressure: Keep your blood pressure under control. Take your medications as prescribed.  Limit Pain Medications: Avoid over-the-counter painkillers like ibuprofen and naproxen, as they can harm your kidneys.  Regular Check-ups: Visit Dr. Kameron Akhtar regularly to monitor your kidney function and overall health.    Follow up in about 6 months (around 3/23/2025), or if symptoms worsen or fail to improve, for Hypertension/lipids.   Today for his office visit prior records has been reviewed     Follow-up in 6 months time earlier if needed.    Current Outpatient Medications:     amLODIPine (NORVASC) 5 MG tablet, TAKE 1 TABLET(5 MG) BY MOUTH EVERY MORNING, Disp: 90 tablet, Rfl: 3    levothyroxine (SYNTHROID) 88 MCG tablet, Take 1 tablet (88 mcg total) by mouth before breakfast., Disp: 90 tablet, Rfl: 3    losartan-hydrochlorothiazide 50-12.5 mg (HYZAAR) 50-12.5 mg per tablet, 1 tablet Daily., Disp: , Rfl:     Current Facility-Administered Medications:     influenza (adjuvanted) (Fluad) 45 mcg/0.5 mL IM vaccine (> or = 66 yo) 0.5 mL, 0.5 mL, Intramuscular, 1 time in Clinic/HOD,     Kameron Akhtar         September 23, 2024        [address]  [place] [address]             [facility] [address]  [address]  [address]  [place] [address]  Phone: [phone]  Fax: [phone]   Patient: Jaswinder Reina   MR Number: [id number]   YOB: 1945   Date of Visit: 9/23/2024     Dear Ms.. Zendejas,       I did happened to see Mr. Jaswinder Reina today for his follow-up and his chronic kidney disease has been noted.  Though he is not diabetic, his blood sugars are slightly elevated.    Would adding Jardiance 10 mg be appropriate given his CKD which might also eventually help with his blood sugars.  Though he is not a diabetic.      Sincerely,      Kameron Akhtar MD        CC  No Recipients    Enclosure

## 2024-09-23 ENCOUNTER — OFFICE VISIT (OUTPATIENT)
Dept: FAMILY MEDICINE | Facility: CLINIC | Age: 79
End: 2024-09-23
Payer: COMMERCIAL

## 2024-09-23 ENCOUNTER — PATIENT MESSAGE (OUTPATIENT)
Dept: FAMILY MEDICINE | Facility: CLINIC | Age: 79
End: 2024-09-23

## 2024-09-23 VITALS
HEIGHT: 69 IN | WEIGHT: 152 LBS | HEART RATE: 76 BPM | DIASTOLIC BLOOD PRESSURE: 59 MMHG | BODY MASS INDEX: 22.51 KG/M2 | SYSTOLIC BLOOD PRESSURE: 120 MMHG

## 2024-09-23 DIAGNOSIS — I51.89 SYSTOLIC DYSFUNCTION WITHOUT HEART FAILURE: ICD-10-CM

## 2024-09-23 DIAGNOSIS — Z23 NEED FOR INFLUENZA VACCINATION: ICD-10-CM

## 2024-09-23 DIAGNOSIS — N18.32 STAGE 3B CHRONIC KIDNEY DISEASE: ICD-10-CM

## 2024-09-23 DIAGNOSIS — I10 BENIGN ESSENTIAL HYPERTENSION: Primary | Chronic | ICD-10-CM

## 2024-09-23 DIAGNOSIS — K21.00 GASTROESOPHAGEAL REFLUX DISEASE WITH ESOPHAGITIS WITHOUT HEMORRHAGE: ICD-10-CM

## 2024-09-23 DIAGNOSIS — E03.9 ACQUIRED HYPOTHYROIDISM: Chronic | ICD-10-CM

## 2024-09-23 PROCEDURE — 1101F PT FALLS ASSESS-DOCD LE1/YR: CPT | Mod: CPTII,S$GLB,, | Performed by: INTERNAL MEDICINE

## 2024-09-23 PROCEDURE — 3078F DIAST BP <80 MM HG: CPT | Mod: CPTII,S$GLB,, | Performed by: INTERNAL MEDICINE

## 2024-09-23 PROCEDURE — 99214 OFFICE O/P EST MOD 30 MIN: CPT | Mod: 25,S$GLB,, | Performed by: INTERNAL MEDICINE

## 2024-09-23 PROCEDURE — 90471 IMMUNIZATION ADMIN: CPT | Mod: S$GLB,,, | Performed by: INTERNAL MEDICINE

## 2024-09-23 PROCEDURE — 1159F MED LIST DOCD IN RCRD: CPT | Mod: CPTII,S$GLB,, | Performed by: INTERNAL MEDICINE

## 2024-09-23 PROCEDURE — 99999 PR PBB SHADOW E&M-EST. PATIENT-LVL III: CPT | Mod: PBBFAC,,, | Performed by: INTERNAL MEDICINE

## 2024-09-23 PROCEDURE — 3074F SYST BP LT 130 MM HG: CPT | Mod: CPTII,S$GLB,, | Performed by: INTERNAL MEDICINE

## 2024-09-23 PROCEDURE — 90653 IIV ADJUVANT VACCINE IM: CPT | Mod: S$GLB,,, | Performed by: INTERNAL MEDICINE

## 2024-09-23 PROCEDURE — 3288F FALL RISK ASSESSMENT DOCD: CPT | Mod: CPTII,S$GLB,, | Performed by: INTERNAL MEDICINE

## 2024-09-23 PROCEDURE — 1126F AMNT PAIN NOTED NONE PRSNT: CPT | Mod: CPTII,S$GLB,, | Performed by: INTERNAL MEDICINE

## 2024-09-23 PROCEDURE — 1160F RVW MEDS BY RX/DR IN RCRD: CPT | Mod: CPTII,S$GLB,, | Performed by: INTERNAL MEDICINE

## 2024-09-23 NOTE — LETTER
September 23, 2024        Roxy Zendejas, JM  [address]  [address]  [place] [address]             [facility] [address]  [address]  [address]  [place] [address]  Phone: [phone]  Fax: [phone]   Patient: Jaswinder Reina   MR Number: [id number]   YOB: 1945   Date of Visit: 9/23/2024     Dear Ms.. Zendejas,       I did happened to see Mr. Jaswinder Reina today for his follow-up and his chronic kidney disease has been noted.  Though he is not diabetic, his blood sugars are slightly elevated.    Would adding Jardiance 10 mg be appropriate given his CKD which might also eventually help with his blood sugars.  Though he is not a diabetic.      Sincerely,      Kameron Akhtar MD        CC  No Recipients    Enclosure

## 2025-01-24 ENCOUNTER — LAB VISIT (OUTPATIENT)
Dept: LAB | Facility: HOSPITAL | Age: 80
End: 2025-01-24
Attending: NURSE PRACTITIONER
Payer: COMMERCIAL

## 2025-01-24 DIAGNOSIS — N18.4 CHRONIC KIDNEY DISEASE, STAGE IV (SEVERE): Primary | ICD-10-CM

## 2025-01-24 DIAGNOSIS — I10 ESSENTIAL HYPERTENSION, MALIGNANT: ICD-10-CM

## 2025-01-24 LAB
25(OH)D3+25(OH)D2 SERPL-MCNC: 60 NG/ML (ref 30–96)
ALBUMIN SERPL BCP-MCNC: 4.1 G/DL (ref 3.5–5.2)
ALBUMIN/CREAT UR: 10.4 UG/MG (ref 0–30)
ANION GAP SERPL CALC-SCNC: 6 MMOL/L (ref 8–16)
BACTERIA #/AREA URNS HPF: ABNORMAL /HPF
BASOPHILS # BLD AUTO: 0.09 K/UL (ref 0–0.2)
BASOPHILS NFR BLD: 1.2 % (ref 0–1.9)
BILIRUB UR QL STRIP: NEGATIVE
BUN SERPL-MCNC: 43 MG/DL (ref 8–23)
CALCIUM SERPL-MCNC: 9.1 MG/DL (ref 8.7–10.5)
CHLORIDE SERPL-SCNC: 109 MMOL/L (ref 95–110)
CLARITY UR: CLEAR
CO2 SERPL-SCNC: 25 MMOL/L (ref 23–29)
COLOR UR: YELLOW
CREAT SERPL-MCNC: 2.2 MG/DL (ref 0.5–1.4)
CREAT UR-MCNC: 146.4 MG/DL (ref 23–375)
CREAT UR-MCNC: 146.4 MG/DL (ref 23–375)
DIFFERENTIAL METHOD BLD: ABNORMAL
EOSINOPHIL # BLD AUTO: 0.1 K/UL (ref 0–0.5)
EOSINOPHIL NFR BLD: 1.8 % (ref 0–8)
ERYTHROCYTE [DISTWIDTH] IN BLOOD BY AUTOMATED COUNT: 12.6 % (ref 11.5–14.5)
EST. GFR  (NO RACE VARIABLE): 29.5 ML/MIN/1.73 M^2
GLUCOSE SERPL-MCNC: 99 MG/DL (ref 70–110)
GLUCOSE UR QL STRIP: ABNORMAL
HCT VFR BLD AUTO: 40.9 % (ref 40–54)
HGB BLD-MCNC: 13.2 G/DL (ref 14–18)
HGB UR QL STRIP: NEGATIVE
HYALINE CASTS #/AREA URNS LPF: 10 /LPF
IMM GRANULOCYTES # BLD AUTO: 0.02 K/UL (ref 0–0.04)
IMM GRANULOCYTES NFR BLD AUTO: 0.3 % (ref 0–0.5)
KETONES UR QL STRIP: NEGATIVE
LEUKOCYTE ESTERASE UR QL STRIP: NEGATIVE
LYMPHOCYTES # BLD AUTO: 1.6 K/UL (ref 1–4.8)
LYMPHOCYTES NFR BLD: 21.4 % (ref 18–48)
MAGNESIUM SERPL-MCNC: 2.1 MG/DL (ref 1.6–2.6)
MCH RBC QN AUTO: 31.3 PG (ref 27–31)
MCHC RBC AUTO-ENTMCNC: 32.3 G/DL (ref 32–36)
MCV RBC AUTO: 97 FL (ref 82–98)
MICROALBUMIN UR DL<=1MG/L-MCNC: 15.2 UG/ML
MICROSCOPIC COMMENT: ABNORMAL
MONOCYTES # BLD AUTO: 0.9 K/UL (ref 0.3–1)
MONOCYTES NFR BLD: 12.4 % (ref 4–15)
NEUTROPHILS # BLD AUTO: 4.7 K/UL (ref 1.8–7.7)
NEUTROPHILS NFR BLD: 62.9 % (ref 38–73)
NITRITE UR QL STRIP: NEGATIVE
NRBC BLD-RTO: 0 /100 WBC
PH UR STRIP: 6 [PH] (ref 5–8)
PHOSPHATE SERPL-MCNC: 3.6 MG/DL (ref 2.7–4.5)
PLATELET # BLD AUTO: 360 K/UL (ref 150–450)
PMV BLD AUTO: 9.2 FL (ref 9.2–12.9)
POTASSIUM SERPL-SCNC: 4.5 MMOL/L (ref 3.5–5.1)
PROT UR QL STRIP: ABNORMAL
PROT UR-MCNC: 39 MG/DL (ref 6–15)
PROT/CREAT UR: 0.27 MG/G{CREAT} (ref 0–0.2)
PTH-INTACT SERPL-MCNC: 75.2 PG/ML (ref 9–77)
RBC # BLD AUTO: 4.22 M/UL (ref 4.6–6.2)
RBC #/AREA URNS HPF: 1 /HPF (ref 0–4)
SODIUM SERPL-SCNC: 140 MMOL/L (ref 136–145)
SP GR UR STRIP: 1.02 (ref 1–1.03)
SQUAMOUS #/AREA URNS HPF: 0 /HPF
URATE SERPL-MCNC: 4.9 MG/DL (ref 3.4–7)
URN SPEC COLLECT METH UR: ABNORMAL
UROBILINOGEN UR STRIP-ACNC: NEGATIVE EU/DL
WBC # BLD AUTO: 7.42 K/UL (ref 3.9–12.7)
WBC #/AREA URNS HPF: 0 /HPF (ref 0–5)
YEAST URNS QL MICRO: ABNORMAL

## 2025-01-24 PROCEDURE — 81001 URINALYSIS AUTO W/SCOPE: CPT | Performed by: NURSE PRACTITIONER

## 2025-01-24 PROCEDURE — 80069 RENAL FUNCTION PANEL: CPT | Performed by: NURSE PRACTITIONER

## 2025-01-24 PROCEDURE — 84550 ASSAY OF BLOOD/URIC ACID: CPT | Performed by: NURSE PRACTITIONER

## 2025-01-24 PROCEDURE — 83735 ASSAY OF MAGNESIUM: CPT | Performed by: NURSE PRACTITIONER

## 2025-01-24 PROCEDURE — 83970 ASSAY OF PARATHORMONE: CPT | Performed by: NURSE PRACTITIONER

## 2025-01-24 PROCEDURE — 84156 ASSAY OF PROTEIN URINE: CPT | Performed by: NURSE PRACTITIONER

## 2025-01-24 PROCEDURE — 85025 COMPLETE CBC W/AUTO DIFF WBC: CPT | Performed by: NURSE PRACTITIONER

## 2025-01-24 PROCEDURE — 82306 VITAMIN D 25 HYDROXY: CPT | Performed by: NURSE PRACTITIONER

## 2025-01-24 PROCEDURE — 36415 COLL VENOUS BLD VENIPUNCTURE: CPT | Performed by: NURSE PRACTITIONER

## 2025-01-24 PROCEDURE — 82043 UR ALBUMIN QUANTITATIVE: CPT | Performed by: NURSE PRACTITIONER

## 2025-03-16 NOTE — PROGRESS NOTES
Subjective:       Patient ID: Jaswinder Reina is a 80 y.o. male.    Chief Complaint: Thyroid Problem, Hypertension, and Chronic Kidney Disease  Mr. Pedro Luis Reina is a 79-year-old gentleman who comes for 4-6 months follow-up.    UNDERLYING MEDICAL ISSUES ARE AS BELOW:-     1.-hypertension currentlyAmlodipine 5 mg and losartan HCTZ 50/12.5  2.- hypothyroidism currently levothyroxine 88 mcg   3.- chronic kidney disease stage IIIB with the last creatinine being 2.2.  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  History of Present Illness    CHIEF COMPLAINT:  Jaswinder presents for a routine follow-up visit to discuss recent lab results and manage ongoing health conditions.    HPI:  Jaswinder is being seen for a follow-up visit to discuss recent lab results and manage ongoing health conditions, including chronic kidney disease, hypertension, and thyroid issues. He reports minimal mucus production when asked about cough or phlegm. His hearing remains stable and memory appears intact. He reports adequate sleep quality and unimpaired driving ability.    Regarding blood pressure, he mentions regular home monitoring, indicating readings lower than those taken during the current visit. He recalls a recent home reading of approximately 230/68, though he expresses uncertainty about the exact numbers.    He is evaluated by nurse practitioner Roxy Zendejas for kidney care. At his last visit with Ms. Zendejas, she indicated no significant changes in his kidney condition.    He denies chest pain, excessive cough, digestive problems, stomach problems, and issues with urine flow control.    MEDICATIONS:  Jaswinder is on Levothyroxine 88 mcg daily for thyroid, Losartan 100 mg daily for blood pressure, and Jardiance 10 mg daily for kidney protection. Amlodipine has been discontinued. Losartan was increased from 50 mg to 100 mg. The  hydrochlorothiazide component of a combination medication has been discontinued.    MEDICAL HISTORY:  Jaswinder has a history of thyroid condition, hypertension, and chronic kidney disease (Stage 4, GFR 29.5). He is a former smoker. Jaswinder has received annual flu vaccines from  to . He has completed pneumonia vaccination, received the RSV vaccine on 23, and has received COVID-19 vaccines.    FAMILY HISTORY:  Family history is significant for mother who  at age 79 and had pneumonia, COPD, and was a smoker. His maternal grandfather  at age 69 due to blood pressure and committed suicide.    SURGICAL HISTORY:  Jaswinder has undergone tonsillectomy, adenoidectomy, and colonoscopy.    TEST RESULTS:  On 2025, the patient's lab results showed BUN at 43, Creatinine at 2.2, GFR at 29.5, Potassium at 4.4, Vitamin D at 60, Parathyroid hormone at 75.2 (should be less than 77), Uric acid at 4.9, Urine protein at 39 (should be less than 15), and Hemoglobin at 13.2. Urinalysis revealed protein and glucose 4+. A thyroid test was conducted in 2024.    SOCIAL HISTORY:  Alcohol: Less than 1-2 glasses per week  Smoking: Former smoker      ROS:  Cardiovascular: +lower extremity edema  Respiratory: +productive cough   Pulmonary:-no cough or shortness of breath  GI:-no nausea, vomiting or diarrhea   -known CKD but no dysuria or infection  Hematological:-known normochromic normocytic anemia        Past Medical History:   Diagnosis Date    GERD (gastroesophageal reflux disease)     History of colonoscopy 2013    The entire colon was normal. Patient was recommended surveillance colonoscopy in 5 years.    History of endoscopy 3/22/2021    Upper GI endoscopy on 2021 by Dr. Ozzie Chan.  Examined esophagus was normal.  Biopsies were taken.  A medium-size hiatal hernia was present.  Stomach was normal.  The examined duodenum was normal.  Await pathology results.  Use Protonix 40 mg b.i.d..  "   Hyperlipidemia     Hypertension     Hypothyroidism      Social History[1]  Past Surgical History:   Procedure Laterality Date    ADENOIDECTOMY      COLONOSCOPY      TONSILLECTOMY       Family History   Problem Relation Name Age of Onset    Pneumonia Mother natalee Clayton Ran     COPD Mother natalee Clayton Ran         smoker    Hypertension Father Nico Reina     Suicide Father Nico Reina        Objective:      Physical Exam  Blood pressure 139/78, pulse 75, height 5' 9" (1.753 m), weight 69 kg (152 lb 1.9 oz). Body mass index is 22.46 kg/m².  Physical Exam    General: No acute distress. Well-developed. Well-nourished.  Eyes: EOMI. Sclerae anicteric.  HENT: Normocephalic. Atraumatic. Nares patent. Moist oral mucosa.    Cardiovascular: Regular rate. Regular rhythm. No murmurs. No rubs. No gallops. Normal S1, S2.  Respiratory: Normal respiratory effort. Clear to auscultation bilaterally. No rales. No rhonchi. No wheezing.  Abdomen: Soft. Non-tender. Non-distended. Normoactive bowel sounds.  Musculoskeletal: No  obvious deformity.  Extremities: No lower extremity edema. Trace edema in both legs.  Neurological: Alert & oriented. No slurred speech. Normal gait.  Psychiatric: Normal mood. Normal affect. Good insight. Good judgment.  Skin: Warm. Dry. No rash.                Assessment:       Lab Visit on 01/24/2025   Component Date Value Ref Range Status    Specimen UA 01/24/2025 Urine, Clean Catch   Final    Color, UA 01/24/2025 Yellow  Yellow, Straw, Candace Final    Appearance, UA 01/24/2025 Clear  Clear Final    pH, UA 01/24/2025 6.0  5.0 - 8.0 Final    Specific Gravity, UA 01/24/2025 1.020  1.005 - 1.030 Final    Protein, UA 01/24/2025 Trace (A)  Negative Final    Glucose, UA 01/24/2025 4+ (A)  Negative Final    Ketones, UA 01/24/2025 Negative  Negative Final    Bilirubin (UA) 01/24/2025 Negative  Negative Final    Occult Blood UA 01/24/2025 Negative  Negative Final    Nitrite, UA " 01/24/2025 Negative  Negative Final    Urobilinogen, UA 01/24/2025 Negative  Negative EU/dL Final    Leukocytes, UA 01/24/2025 Negative  Negative Final    WBC 01/24/2025 7.42  3.90 - 12.70 K/uL Final    RBC 01/24/2025 4.22 (L)  4.60 - 6.20 M/uL Final    Hemoglobin 01/24/2025 13.2 (L)  14.0 - 18.0 g/dL Final    Hematocrit 01/24/2025 40.9  40.0 - 54.0 % Final    MCV 01/24/2025 97  82 - 98 fL Final    MCH 01/24/2025 31.3 (H)  27.0 - 31.0 pg Final    MCHC 01/24/2025 32.3  32.0 - 36.0 g/dL Final    RDW 01/24/2025 12.6  11.5 - 14.5 % Final    Platelets 01/24/2025 360  150 - 450 K/uL Final    MPV 01/24/2025 9.2  9.2 - 12.9 fL Final    Immature Granulocytes 01/24/2025 0.3  0.0 - 0.5 % Final    Gran # (ANC) 01/24/2025 4.7  1.8 - 7.7 K/uL Final    Immature Grans (Abs) 01/24/2025 0.02  0.00 - 0.04 K/uL Final    Lymph # 01/24/2025 1.6  1.0 - 4.8 K/uL Final    Mono # 01/24/2025 0.9  0.3 - 1.0 K/uL Final    Eos # 01/24/2025 0.1  0.0 - 0.5 K/uL Final    Baso # 01/24/2025 0.09  0.00 - 0.20 K/uL Final    nRBC 01/24/2025 0  0 /100 WBC Final    Gran % 01/24/2025 62.9  38.0 - 73.0 % Final    Lymph % 01/24/2025 21.4  18.0 - 48.0 % Final    Mono % 01/24/2025 12.4  4.0 - 15.0 % Final    Eosinophil % 01/24/2025 1.8  0.0 - 8.0 % Final    Basophil % 01/24/2025 1.2  0.0 - 1.9 % Final    Differential Method 01/24/2025 Automated   Final    Glucose 01/24/2025 99  70 - 110 mg/dL Final    Sodium 01/24/2025 140  136 - 145 mmol/L Final    Potassium 01/24/2025 4.5  3.5 - 5.1 mmol/L Final    Chloride 01/24/2025 109  95 - 110 mmol/L Final    CO2 01/24/2025 25  23 - 29 mmol/L Final    BUN 01/24/2025 43 (H)  8 - 23 mg/dL Final    Calcium 01/24/2025 9.1  8.7 - 10.5 mg/dL Final    Creatinine 01/24/2025 2.2 (H)  0.5 - 1.4 mg/dL Final    Albumin 01/24/2025 4.1  3.5 - 5.2 g/dL Final    Phosphorus 01/24/2025 3.6  2.7 - 4.5 mg/dL Final    eGFR 01/24/2025 29.5 (A)  >60 mL/min/1.73 m^2 Final    Anion Gap 01/24/2025 6 (L)  8 - 16 mmol/L Final    Microalbumin,  Urine 01/24/2025 15.2  <19.9 ug/mL Final    Creatinine, Urine 01/24/2025 146.4  23.0 - 375.0 mg/dL Final    Microalb/Creat Ratio 01/24/2025 10.4  0.0 - 30.0 ug/mg Final    Protein, Urine Random 01/24/2025 39 (H)  6 - 15 mg/dL Final    Creatinine, Urine 01/24/2025 146.4  23.0 - 375.0 mg/dL Final    Prot/Creat Ratio, Urine 01/24/2025 0.27 (H)  0.00 - 0.20 Final    PTH, Intact 01/24/2025 75.2  9.0 - 77.0 pg/mL Final    Vit D, 25-Hydroxy 01/24/2025 60  30 - 96 ng/mL Final    Magnesium 01/24/2025 2.1  1.6 - 2.6 mg/dL Final    Uric Acid 01/24/2025 4.9  3.4 - 7.0 mg/dL Final    RBC, UA 01/24/2025 1  0 - 4 /hpf Final    WBC, UA 01/24/2025 0  0 - 5 /hpf Final    Bacteria 01/24/2025 None  None-Occ /hpf Final    Yeast, UA 01/24/2025 None  None Final    Squam Epithel, UA 01/24/2025 0  /hpf Final    Hyaline Casts, UA 01/24/2025 10 (A)  0-1/lpf /lpf Final    Microscopic Comment 01/24/2025 SEE COMMENT   Final     BUN 43 High  45 High  46 High  45 High  41 High  40 High   37 High    Calcium 9.1 8.8 9.3 9.0 9.0 8.8  9.0   Creatinine 2.2 High  2.3 High  2.2 High  2.0 High  2.2 High  2.0 High   2.0 High    Albumin 4.1 3.9 4.1 3.9 3.9 3.9  3.6   Phosphorus 3.6 3.3 4.2 4.0 3.7 3.6 3.7 3.7   eGFR 29.5 Abnormal  28.2 Abnormal  29.7 Abnormal  33.3 Abnormal  29.9 Abnormal  33.5 Abnormal   33.5 Abnormal    Anion Gap 6 Low  8 7 Low  7 Low  7 Low  7 Low   6 Low    0 Result Notes           Component  Ref Range & Units (hover) 1 mo ago  (1/24/25) 7 mo ago  (8/12/24) 1 yr ago  (1/25/24) 1 yr ago  (8/23/23) 1 yr ago  (5/12/23) 2 yr ago  (6/13/22)   PTH, Intact 75.2 78.4 High  70.7 43.0 49.1 50.0        Component  Ref Range & Units (hover) 1 mo ago 7 mo ago 1 yr ago   Vit D, 25-Hydroxy 60 49 CM 55 CM   Comment: Vitamin D deficiency.........<10 ng/mL                              Vitamin D insufficiency......10-29 ng/mL      Vitamin D sufficiency........> or equal to 30 ng/mL  Vitamin D toxicity............>100 ng/mL      SMLB        Component  Ref  Range & Units (hover) 1 mo ago  (1/24/25) 7 mo ago  (8/12/24) 1 yr ago  (1/25/24) 1 yr ago  (8/23/23) 1 yr ago  (5/12/23)   Uric Acid 4.9 6.8 6.2 5.9 5.2      SMLB SMLB SMLB          Component  Ref Range & Units (hover) 3 yr ago  (12/10/21) 4 yr ago  (11/28/20) 4 yr ago  (8/26/20) 16 yr ago  (11/14/08) 17 yr ago  (8/28/07) 19 yr ago  (3/3/06) 20 yr ago  (3/3/05)   WBC 8.23 7.69 7.71 6.74 R 5.94 R 6.66 R 6.24 R   RBC 4.12 Low  4.31 Low  4.06 Low  4.38 Low  4.47 Low  4.57 Low  4.48 Low    Hemoglobin 12.9 Low  13.4 Low  12.6 Low  13.9 Low  R 13.9 Low  R 14.3 R 14.5 R   Hematocrit 39.6 Low  41.5 38.6 Low  41.0 41.6 42.3 42.1   MCV 96 96 95 93.6 R 93.1 R 92.6 R 94.0 R   MCH 31.3 High  31.1 High  31.0 31.7 High  R 31.1 High  R 31.3 High  R 32.4 High  R   MCHC 32.6 32.3 32.6 33.9 R 33.4 R 33.8 R 34.4 R   RDW 12.8 12.7 12.7 12.9 12.4 12.5 12.3   Platelets 346 363 High  R 307 R 373 High  R 353 High  R 374 High  R 500 High        Component  Ref Range & Units (hover) 1 mo ago  (1/24/25) 7 mo ago  (8/12/24) 11 mo ago  (3/26/24) 1 yr ago  (8/23/23) 1 yr ago  (5/12/23) 2 yr ago  (6/13/22) 3 yr ago  (12/10/21)   WBC 7.42 7.57 7.50 7.90 7.95 8.13 8.23   RBC 4.22 Low  3.66 Low  3.87 Low  3.62 Low  3.89 Low  4.12 Low  4.12 Low    Hemoglobin 13.2 Low  11.6 Low  12.3 Low  11.7 Low  12.5 Low  13.2 Low  12.9 Low    Hematocrit 40.9 35.3 Low  37.5 Low  35.1 Low  38.0 Low  39.9 Low  39.6 Low    MCV 97 96 97 97 98 97 96   MCH 31.3 High  31.7 High  31.8 High  32.3 High  32.1 High  32.0 High                  Component  Ref Range & Units (hover) 1 mo ago  (1/24/25) 1 mo ago  (1/24/25) 7 mo ago  (8/12/24) 7 mo ago  (8/12/24) 1 yr ago  (1/25/24) 1 yr ago  (8/23/23) 1 yr ago  (5/12/23) 1 yr ago  (5/12/23)   Protein, Urine Random 39 High    23 High  CM  12 CM  19 High  CM   Comment: The random urine reference ranges provided were established  for 24 hour urine collections.  No reference ranges exist for  random urine specimens.  Correlate  clinically.   Creatinine, Urine 146.4 146.4 .3 .3 CM <1.0 Low  CM  170.0 .0 CM   Comment: The random urine reference ranges provided were established  for 24 hour urine collections.  No reference ranges exist for  random urine specimens.  Correlate clinically.   Prot/Creat Ratio, Urine 0.27 High    0.13    0.11        Narrative & Impression  Renal ultrasound     Clinical history is chronic kidney disease     COMPARISON: 11/28/2020     The aorta and IVC are normal in caliber.  Right kidney measures 8.6 x 4.4 x 4.1 cm. There is no hydronephrosis. There is a 3.8 x 4.3 x 2.8 cm simple right renal cysts.     The left kidney measures 9.8 x 4.8 x 6.5 cm without hydronephrosis. There are multiple left renal cysts the largest measuring 4.5 cm arising from the lower pole.  The bladder is normal.     IMPRESSION: Stable bilateral simple renal cysts     No hydronephrosis     Electronically signed by:  Lavonne Jeffery MD  8/21/2023 9:27 AM CDT Workstation: [id number]        Exam Ended: 08/21/23 09:09 CDT Last Resulted: 08/21/23 09:27 CDT       Assessment & Plan    IMPRESSION:  - Reviewed renal function: Creatinine 2.2, GFR 29.5, indicating chronic CKD stage 4. Noted stable creatinine levels over past year.  - Assessed electrolytes: Potassium 4.4, WNL.  - Evaluated vitamin D (60) and parathyroid hormone (75.2) levels, both within acceptable ranges.  - Noted slightly elevated urine protein (39, normal <15), consistent with chronic CKD.  - Hemoglobin 13.2, slightly low but not concerning given CKD.  - Observed 4+ glucose in urine, expected due to Jardiance use.  - Will add aspirin for cardiovascular protection given increased risk with CKD. Explained rationale for aspirin use in CKD patients to reduce risk of cardiovascular events.  - Discussed colonoscopy screening, not strongly recommended after age 75; left decision to patient's discretion.  - Discussed measles immunity from previous infection, no need for  additional vaccination.    N18.4 CHRONIC KIDNEY DISEASE, STAGE 4 (SEVERE):  - Reviewed recent lab results from January 24th, including BUN, creatinine, GFR, potassium, vitamin D, parathyroid hormone, uric acid, and urine protein levels.  - Noted patient's creatinine level is 2.2, showing a steady state for the last year.  - BUN was 43.  - GFR is 29.5, qualifying as chronic kidney disease stage 4.  - Observed potassium level is 4.4, which is critical for kidney disease patients.  - Vitamin D level was 60, which is satisfactory.  - Parathyroid hormone was 75.2 (should be less than 77).  - Noted urine protein was elevated at 39 (should be less than 15).  - Hemoglobin was 13.2, slightly low but not concerning.  - Urine test showed protein and glucose 4+, with significant glucosuria, which is expected due to Jardiance medication.  - Continued Jardiance 10 mg for kidney protection.  - Explained Jardiance mechanism: eliminates excess glucose through urine, originally for diabetes but found to preserve renal function.  - Recommend taking aspirin 81 mg twice weekly to reduce the risk of cardiovascular events associated with kidney disease.  - Instructed to inform nephrologist (Roxy Zendejas) about aspirin recommendation.  - Ordered labs to be completed before next visit, can be done in conjunction with nephrologist's labs.  - Scheduled follow up in 6 months.    I10 ESSENTIAL (PRIMARY) HYPERTENSION:  - Measured blood pressure during office visit at 157/79, which is elevated and unsatisfactory.  - Noted patient reports lower blood pressure readings at home and during kidney clinic visits.  - Advised patient to verify blood pressure readings at home, as office readings may be higher due to automatic machines.  - Instructed patient to monitor blood pressure at home and report readings to the office.  - Continued Losartan 100 mg for blood pressure management.  - Noted previous regimen included Losartan hydrochlorothiazide 50/12.5,  which was increased to 100 mg, and amlodipine was discontinued.    E03.9 HYPOTHYROIDISM, UNSPECIFIED:  - Continued Levothyroxine 88 mcg, sent to [facility] for 1 year supply (90 days at a time).  - Ordered thyroid function tests to be done with next set of labs.  - Planned to check thyroid levels before the next visit in 6 months.    R60.0 LOCALIZED EDEMA:  - Observed some swelling in both legs, with trace edema on exam.    R80.1 PERSISTENT PROTEINURIA, UNSPECIFIED:  - Noted urine protein level was elevated at 39 (should be less than 15).  - Urine test showed protein and glucose 4+.  - Explained that protein leakage in urine is not a good sign but is expected in patients with chronic kidney disease.    Z87.891 PERSONAL HISTORY OF NICOTINE DEPENDENCE:  - Acknowledged patient's history as a former smoker who quit long ago.    Z90.09 ACQUIRED ABSENCE OF OTHER PART OF HEAD AND NECK:  - Noted patient's history of tonsillectomy and adenoidectomy.    Z82.5 FAMILY HISTORY OF ASTHMA AND OTHER CHRONIC LOWER RESPIRATORY DISEASES:  - Noted family history of mother having CO  PD and being a smoker.    Z81.8 FAMILY HISTORY OF OTHER MENTAL AND BEHAVIORAL DISORDERS:  - Noted family history of father committing suicide.    Z79.82 LONG TERM (CURRENT) USE OF ASPIRIN:  - Recommend starting aspirin 81 mg twice weekly (OTC) to reduce the risk of cardiovascular events associated with kidney disease.  - Suggested OTC Lg's aspirin.         Plan:   Stage 4 chronic kidney disease  Comments:  Continue to remain hydrated and avoid NSAIDs.  Continue with Jardiance.  Mycotic infection precautions discussed.  Orders:  -     aspirin (ECOTRIN) 81 MG EC tablet; Take 1 tablet (81 mg total) by mouth every Mon, Fri.    Systolic dysfunction without heart failure  Comments:  Continue efforts at blood pressure control.    Benign essential hypertension  Comments:  Pressures are under reasonable control and currently on losartan 100 mg not on losartan  HCTZ  Orders:  -     losartan (COZAAR) 100 MG tablet; Take 1 tablet (100 mg total) by mouth once daily. Med prescribed by Nephrology Ms Howie duran  -     Lipid Panel; Future; Expected date: 03/18/2025    Normocytic normochromic anemia  Comments:  Secondary to CKD.  Continue to monitored    Gastroesophageal reflux disease with esophagitis without hemorrhage  Comments:  Continue to watch diet.  Currently not any medication    Acquired hypothyroidism  Comments:  After reduction of levothyroxine to 88 mcg sTSH is stabilized.  Continue same a repeat six-month  Orders:  -     TSH; Future; Expected date: 03/18/2025  -     levothyroxine (SYNTHROID) 88 MCG tablet; Take 1 tablet (88 mcg total) by mouth before breakfast.  Dispense: 90 tablet; Refill: 3      Patient's slow and steady progression of CKD with current stabilization at CKD stage 4 has been noted  He is following up with Nephrology also.  Chronic anemia associated with CKD has been noted which seems to be stable.  PTH levels are stable at this point though 1 of the readings were somewhat elevated.  He is on ARB and consideration for SGLT2 like empagliflozin or dapagliflozin needs to be given. Off HCTZ-secondary due to introduction of Jardiance.  Last imaging study had shown bilateral simple renal cysts but no hydronephrosis or evidence of obstruction.  Vitamin-D levels are normal.  Blood pressure control is stable.  There is no significant protein urea noted in the urine syndrome.  TSH is stable at this point and previously it was low which led to reduction of levothyroxine dosage.  Reflux symptoms are stable and currently not on any medication.  Follow up in about 6 months (around 9/17/2025), or if symptoms worsen or fail to improve, for HTN/Thyroid.    Current Medications[2]    This note was generated with the assistance of ambient listening technology. Verbal consent was obtained by the patient and accompanying visitor(s) for the recording of patient  appointment to facilitate this note. I attest to having reviewed and edited the generated note for accuracy, though some syntax or spelling errors may persist. Please contact the author of this note for any clarification.      Kameron Akhtar    Visit today included increased complexity associated with the care of the episodic problem HTN,CKD,Thyroid addressed and managing the longitudinal care of the patient due to the serious and/or complex managed problem(s) HTN, Thyroid.          [1]   Social History  Socioeconomic History    Marital status: Single    Number of children: 0   Occupational History    Occupation:  [employer] retd   Tobacco Use    Smoking status: Former     Current packs/day: 0.00     Average packs/day: 2.0 packs/day for 11.0 years (22.0 ttl pk-yrs)     Types: Cigarettes     Start date: 1962     Quit date: 1973     Years since quittin.2    Smokeless tobacco: Never   Substance and Sexual Activity    Alcohol use: Yes     Alcohol/week: 1.0 - 2.0 standard drink of alcohol     Types: 1 - 2 Glasses of wine per week     Comment: occ    Drug use: No    Sexual activity: Not Currently     Partners: Female     Comment: Not sexually active     Social Drivers of Health     Financial Resource Strain: Low Risk  (3/29/2024)    Overall Financial Resource Strain (CARDIA)     Difficulty of Paying Living Expenses: Not hard at all   Food Insecurity: No Food Insecurity (3/29/2024)    Hunger Vital Sign     Worried About Running Out of Food in the Last Year: Never true     Ran Out of Food in the Last Year: Never true   Transportation Needs: No Transportation Needs (3/29/2024)    PRAPARE - Transportation     Lack of Transportation (Medical): No     Lack of Transportation (Non-Medical): No   Physical Activity: Sufficiently Active (3/29/2024)    Exercise Vital Sign     Days of Exercise per Week: 5 days     Minutes of Exercise per Session: 40 min   Stress: No Stress Concern Present (3/29/2024)    [employer] - Occupational Stress Questionnaire     Feeling of Stress : Not at all   Housing Stability: Low Risk  (3/29/2024)    Housing Stability Vital Sign     Unable to Pay for Housing in the Last Year: No     Number of Places Lived in the Last Year: 1     Unstable Housing in the Last Year: No   [2]   Current Outpatient Medications:     aspirin (ECOTRIN) 81 MG EC tablet, Take 1 tablet (81 mg total) by mouth every Mon, Fri., Disp: , Rfl:     JARDIANCE 10 mg tablet, Take 10 mg by mouth once daily., Disp: , Rfl:     levothyroxine (SYNTHROID) 88 MCG tablet, Take 1 tablet (88 mcg total) by mouth before breakfast., Disp: 90 tablet, Rfl: 3    losartan (COZAAR) 100 MG tablet, Take 1 tablet (100 mg total) by mouth once daily. Med prescribed by Nephrology Ms Howie duran, Disp: , Rfl:

## 2025-03-17 ENCOUNTER — OFFICE VISIT (OUTPATIENT)
Dept: FAMILY MEDICINE | Facility: CLINIC | Age: 80
End: 2025-03-17
Payer: COMMERCIAL

## 2025-03-17 ENCOUNTER — PATIENT MESSAGE (OUTPATIENT)
Dept: FAMILY MEDICINE | Facility: CLINIC | Age: 80
End: 2025-03-17

## 2025-03-17 VITALS
HEART RATE: 75 BPM | SYSTOLIC BLOOD PRESSURE: 139 MMHG | BODY MASS INDEX: 22.53 KG/M2 | DIASTOLIC BLOOD PRESSURE: 78 MMHG | WEIGHT: 152.13 LBS | HEIGHT: 69 IN

## 2025-03-17 DIAGNOSIS — I10 BENIGN ESSENTIAL HYPERTENSION: Chronic | ICD-10-CM

## 2025-03-17 DIAGNOSIS — E03.9 ACQUIRED HYPOTHYROIDISM: Chronic | ICD-10-CM

## 2025-03-17 DIAGNOSIS — K21.00 GASTROESOPHAGEAL REFLUX DISEASE WITH ESOPHAGITIS WITHOUT HEMORRHAGE: ICD-10-CM

## 2025-03-17 DIAGNOSIS — N18.4 STAGE 4 CHRONIC KIDNEY DISEASE: Primary | ICD-10-CM

## 2025-03-17 DIAGNOSIS — D64.9 NORMOCYTIC NORMOCHROMIC ANEMIA: ICD-10-CM

## 2025-03-17 DIAGNOSIS — I51.89 SYSTOLIC DYSFUNCTION WITHOUT HEART FAILURE: ICD-10-CM

## 2025-03-17 PROCEDURE — 3078F DIAST BP <80 MM HG: CPT | Mod: CPTII,S$GLB,, | Performed by: INTERNAL MEDICINE

## 2025-03-17 PROCEDURE — 1101F PT FALLS ASSESS-DOCD LE1/YR: CPT | Mod: CPTII,S$GLB,, | Performed by: INTERNAL MEDICINE

## 2025-03-17 PROCEDURE — G2211 COMPLEX E/M VISIT ADD ON: HCPCS | Mod: S$GLB,,, | Performed by: INTERNAL MEDICINE

## 2025-03-17 PROCEDURE — 99214 OFFICE O/P EST MOD 30 MIN: CPT | Mod: S$GLB,,, | Performed by: INTERNAL MEDICINE

## 2025-03-17 PROCEDURE — 1160F RVW MEDS BY RX/DR IN RCRD: CPT | Mod: CPTII,S$GLB,, | Performed by: INTERNAL MEDICINE

## 2025-03-17 PROCEDURE — 3288F FALL RISK ASSESSMENT DOCD: CPT | Mod: CPTII,S$GLB,, | Performed by: INTERNAL MEDICINE

## 2025-03-17 PROCEDURE — 1159F MED LIST DOCD IN RCRD: CPT | Mod: CPTII,S$GLB,, | Performed by: INTERNAL MEDICINE

## 2025-03-17 PROCEDURE — 1126F AMNT PAIN NOTED NONE PRSNT: CPT | Mod: CPTII,S$GLB,, | Performed by: INTERNAL MEDICINE

## 2025-03-17 PROCEDURE — 99999 PR PBB SHADOW E&M-EST. PATIENT-LVL III: CPT | Mod: PBBFAC,,, | Performed by: INTERNAL MEDICINE

## 2025-03-17 PROCEDURE — 3075F SYST BP GE 130 - 139MM HG: CPT | Mod: CPTII,S$GLB,, | Performed by: INTERNAL MEDICINE

## 2025-03-17 RX ORDER — LOSARTAN POTASSIUM 100 MG/1
100 TABLET ORAL DAILY
Start: 2025-03-17 | End: 2026-03-17

## 2025-03-17 RX ORDER — EMPAGLIFLOZIN 10 MG/1
10 TABLET, FILM COATED ORAL DAILY
COMMUNITY

## 2025-03-17 RX ORDER — ASPIRIN 81 MG/1
81 TABLET ORAL
COMMUNITY
Start: 2025-03-17 | End: 2026-03-17

## 2025-03-17 RX ORDER — LEVOTHYROXINE SODIUM 88 UG/1
88 TABLET ORAL
Qty: 90 TABLET | Refills: 3 | Status: SHIPPED | OUTPATIENT
Start: 2025-03-17

## 2025-05-30 ENCOUNTER — LAB VISIT (OUTPATIENT)
Dept: LAB | Facility: HOSPITAL | Age: 80
End: 2025-05-30
Attending: NURSE PRACTITIONER
Payer: COMMERCIAL

## 2025-05-30 DIAGNOSIS — N18.4 CHRONIC KIDNEY DISEASE, STAGE IV (SEVERE): Primary | ICD-10-CM

## 2025-05-30 DIAGNOSIS — I10 ESSENTIAL HYPERTENSION, MALIGNANT: ICD-10-CM

## 2025-05-30 LAB
25(OH)D3+25(OH)D2 SERPL-MCNC: 47 NG/ML (ref 30–96)
ABSOLUTE EOSINOPHIL (SMH): 0.09 K/UL
ABSOLUTE MONOCYTE (SMH): 0.91 K/UL (ref 0.3–1)
ABSOLUTE NEUTROPHIL COUNT (SMH): 5.1 K/UL (ref 1.8–7.7)
ALBUMIN SERPL-MCNC: 3.8 G/DL (ref 3.5–5.2)
ALBUMIN/CREAT UR: NORMAL
ANION GAP (SMH): 8 MMOL/L (ref 8–16)
BASOPHILS # BLD AUTO: 0.08 K/UL
BASOPHILS NFR BLD AUTO: 1 %
BILIRUB UR QL STRIP.AUTO: NEGATIVE
BUN SERPL-MCNC: 39 MG/DL (ref 8–23)
CALCIUM SERPL-MCNC: 8.8 MG/DL (ref 8.7–10.5)
CHLORIDE SERPL-SCNC: 109 MMOL/L (ref 95–110)
CLARITY UR: CLEAR
CO2 SERPL-SCNC: 22 MMOL/L (ref 23–29)
COLOR UR AUTO: YELLOW
CREAT SERPL-MCNC: 2.2 MG/DL (ref 0.5–1.4)
CREAT UR-MCNC: 104.7 MG/DL (ref 23–375)
CREAT UR-MCNC: 105.4 MG/DL (ref 23–375)
ERYTHROCYTE [DISTWIDTH] IN BLOOD BY AUTOMATED COUNT: 13.2 % (ref 11.5–14.5)
GFR SERPLBLD CREATININE-BSD FMLA CKD-EPI: 30 ML/MIN/1.73/M2
GLUCOSE SERPL-MCNC: 104 MG/DL (ref 70–110)
GLUCOSE UR QL STRIP: ABNORMAL
HCT VFR BLD AUTO: 37.9 % (ref 40–54)
HGB BLD-MCNC: 12 GM/DL (ref 14–18)
HGB UR QL STRIP: NEGATIVE
IMM GRANULOCYTES # BLD AUTO: 0.03 K/UL (ref 0–0.04)
IMM GRANULOCYTES NFR BLD AUTO: 0.4 % (ref 0–0.5)
KETONES UR QL STRIP: NEGATIVE
LEUKOCYTE ESTERASE UR QL STRIP: NEGATIVE
LYMPHOCYTES # BLD AUTO: 1.72 K/UL (ref 1–4.8)
MAGNESIUM SERPL-MCNC: 2.2 MG/DL (ref 1.6–2.6)
MCH RBC QN AUTO: 31.3 PG (ref 27–31)
MCHC RBC AUTO-ENTMCNC: 31.7 G/DL (ref 32–36)
MCV RBC AUTO: 99 FL (ref 82–98)
MICROALBUMIN UR-MCNC: <7 UG/ML
MICROSCOPIC COMMENT: NORMAL
NITRITE UR QL STRIP: NEGATIVE
NUCLEATED RBC (/100WBC) (SMH): 0 /100 WBC
PH UR STRIP: 5 [PH]
PHOSPHATE SERPL-MCNC: 3.5 MG/DL (ref 2.7–4.5)
PLATELET # BLD AUTO: 363 K/UL (ref 150–450)
PMV BLD AUTO: 9.3 FL (ref 9.2–12.9)
POTASSIUM SERPL-SCNC: 4.5 MMOL/L (ref 3.5–5.1)
PROT UR QL STRIP: NEGATIVE
PROT UR-MCNC: 24 MG/DL
PROT/CREAT UR: 0.23 MG/G{CREAT}
PTH-INTACT SERPL-MCNC: 66.7 PG/ML (ref 9–77)
RBC # BLD AUTO: 3.83 M/UL (ref 4.6–6.2)
RBC #/AREA URNS AUTO: <1 /HPF
RELATIVE EOSINOPHIL (SMH): 1.1 % (ref 0–8)
RELATIVE LYMPHOCYTE (SMH): 21.6 % (ref 18–48)
RELATIVE MONOCYTE (SMH): 11.4 % (ref 4–15)
RELATIVE NEUTROPHIL (SMH): 64.5 % (ref 38–73)
SODIUM SERPL-SCNC: 139 MMOL/L (ref 136–145)
SP GR UR STRIP: 1.01
URATE SERPL-MCNC: 4.7 MG/DL (ref 3.4–7)
UROBILINOGEN UR STRIP-ACNC: NEGATIVE EU/DL
WBC # BLD AUTO: 7.97 K/UL (ref 3.9–12.7)
WBC #/AREA URNS AUTO: <1 /HPF

## 2025-05-30 PROCEDURE — 81003 URINALYSIS AUTO W/O SCOPE: CPT

## 2025-05-30 PROCEDURE — 82040 ASSAY OF SERUM ALBUMIN: CPT

## 2025-05-30 PROCEDURE — 36415 COLL VENOUS BLD VENIPUNCTURE: CPT

## 2025-05-30 PROCEDURE — 82570 ASSAY OF URINE CREATININE: CPT

## 2025-05-30 PROCEDURE — 83735 ASSAY OF MAGNESIUM: CPT

## 2025-05-30 PROCEDURE — 84550 ASSAY OF BLOOD/URIC ACID: CPT

## 2025-05-30 PROCEDURE — 85025 COMPLETE CBC W/AUTO DIFF WBC: CPT

## 2025-05-30 PROCEDURE — 82306 VITAMIN D 25 HYDROXY: CPT

## 2025-05-30 PROCEDURE — 83970 ASSAY OF PARATHORMONE: CPT

## 2025-05-30 PROCEDURE — 82570 ASSAY OF URINE CREATININE: CPT | Mod: 59

## 2025-07-16 ENCOUNTER — OFFICE VISIT (OUTPATIENT)
Dept: FAMILY MEDICINE | Facility: CLINIC | Age: 80
End: 2025-07-16
Payer: COMMERCIAL

## 2025-07-16 VITALS
HEART RATE: 66 BPM | DIASTOLIC BLOOD PRESSURE: 64 MMHG | WEIGHT: 148.56 LBS | HEIGHT: 69 IN | BODY MASS INDEX: 22 KG/M2 | SYSTOLIC BLOOD PRESSURE: 134 MMHG

## 2025-07-16 DIAGNOSIS — D64.9 NORMOCYTIC NORMOCHROMIC ANEMIA: ICD-10-CM

## 2025-07-16 DIAGNOSIS — E03.9 ACQUIRED HYPOTHYROIDISM: ICD-10-CM

## 2025-07-16 DIAGNOSIS — N18.32 STAGE 3B CHRONIC KIDNEY DISEASE: ICD-10-CM

## 2025-07-16 DIAGNOSIS — I10 BENIGN ESSENTIAL HYPERTENSION: ICD-10-CM

## 2025-07-16 DIAGNOSIS — K21.00 GASTROESOPHAGEAL REFLUX DISEASE WITH ESOPHAGITIS WITHOUT HEMORRHAGE: ICD-10-CM

## 2025-07-16 DIAGNOSIS — Z01.818 PREOP EXAM FOR INTERNAL MEDICINE: Primary | ICD-10-CM

## 2025-07-16 PROCEDURE — 1125F AMNT PAIN NOTED PAIN PRSNT: CPT | Mod: CPTII,S$GLB,, | Performed by: INTERNAL MEDICINE

## 2025-07-16 PROCEDURE — 3288F FALL RISK ASSESSMENT DOCD: CPT | Mod: CPTII,S$GLB,, | Performed by: INTERNAL MEDICINE

## 2025-07-16 PROCEDURE — 3075F SYST BP GE 130 - 139MM HG: CPT | Mod: CPTII,S$GLB,, | Performed by: INTERNAL MEDICINE

## 2025-07-16 PROCEDURE — 1101F PT FALLS ASSESS-DOCD LE1/YR: CPT | Mod: CPTII,S$GLB,, | Performed by: INTERNAL MEDICINE

## 2025-07-16 PROCEDURE — 1159F MED LIST DOCD IN RCRD: CPT | Mod: CPTII,S$GLB,, | Performed by: INTERNAL MEDICINE

## 2025-07-16 PROCEDURE — 99214 OFFICE O/P EST MOD 30 MIN: CPT | Mod: S$GLB,,, | Performed by: INTERNAL MEDICINE

## 2025-07-16 PROCEDURE — 1160F RVW MEDS BY RX/DR IN RCRD: CPT | Mod: CPTII,S$GLB,, | Performed by: INTERNAL MEDICINE

## 2025-07-16 PROCEDURE — 1170F FXNL STATUS ASSESSED: CPT | Mod: CPTII,S$GLB,, | Performed by: INTERNAL MEDICINE

## 2025-07-16 PROCEDURE — 3078F DIAST BP <80 MM HG: CPT | Mod: CPTII,S$GLB,, | Performed by: INTERNAL MEDICINE

## 2025-07-16 PROCEDURE — 99999 PR PBB SHADOW E&M-EST. PATIENT-LVL IV: CPT | Mod: PBBFAC,,, | Performed by: INTERNAL MEDICINE

## 2025-07-16 NOTE — PROGRESS NOTES
Subjective:       Patient ID: Jaswinder Reina is a 80 y.o. male.    Chief Complaint: pre op clearance (Cataracts- Dr. Ling Valladares- fax # [phone]), Hypertension, Thyroid Problem, and Chronic Kidney Disease  Mr. Reina is a 80-year-old gentleman who comes here for preoperative clearance.    Underlying medical issues include the following:-    -hypertension for which he is taking losartan.  History of Present Illness    CHIEF COMPLAINT:  Jaswinder presents for medical clearance for upcoming cataract surgery.    HPI:  Jaswinder is scheduled for cataract surgery on August 13th, to be performed by Dr. Ling Valladares on the right eye under light sedation. He has been diagnosed with chronic kidney disease, with recent lab results showing a creatinine of 2.2, BUN of 3.9, and GFR of 30. His blood pressure was initially elevated during the visit but later normalized. He does not regularly check blood pressure at home. He reports stable reflux symptoms. He has chronic anemia, suspected to be secondary to chronic kidney disease. He continues to follow up with nephrology and is currently taking Jardiance 10 mg for kidney protection. His thyroid function was last checked in September 2024 and was within normal range. He is seeing a new nephrologist, Dr. Willis Brasher, who has taken over from Dr. Efrain Morales.    He denies any chest pain, cough, phlegm, mucus, fever, or known blockages in blood vessels.    MEDICATIONS:  Jaswinder is on Losartan for blood pressure management, Levothyroxine 88 mcg for hypothyroidism, and Jardiance 10 mg for kidney protection.    MEDICAL HISTORY:  Jaswinder has a history of hypertension, chronic kidney disease (Stage 3B), hypothyroidism, chronic anemia secondary to chronic kidney disease, GERD, and cataracts.    SURGICAL HISTORY:  Jaswinder is scheduled for cataract surgery on August 13th for his right eye. The procedure will be performed by Dr. Ling Valladares under monitored anesthesia care.    TEST  "RESULTS:  On 5/30/25, the patient's uric acid level was 4.7, within normal range. Magnesium was also within normal range. Vitamin D was 47, within the normal range of 30-96. Intact PTH was 66.7, which was normal. Creatinine was 2.2, BUN was 3.9, and GFR was 30. In September 2024, TSH was 1.194, which was within range.    IMAGING:  Jaswinder underwent an echocardiogram 41 months ago, but the results were not specified.    SOCIAL HISTORY:  Alcohol: Minimal consumption      ROS:  General: -fever, -chills, -fatigue, -weight gain, -weight loss  Cardiovascular: -chest pain, -palpitations, -lower extremity edema  Respiratory: -cough, -shortness of breath  Gastrointestinal: -abdominal pain, -nausea, -vomiting, -diarrhea, -constipation, -blood in stool, +indigestion  Skin: -rash, -lesion  Neurological: -headache, -dizziness, -numbness, -tingling, +tremors         Past Medical History:   Diagnosis Date    GERD (gastroesophageal reflux disease)     History of colonoscopy 6/12/2013    The entire colon was normal. Patient was recommended surveillance colonoscopy in 5 years.    History of endoscopy 3/22/2021    Upper GI endoscopy on 03/22/2021 by Dr. Ozzie Chan.  Examined esophagus was normal.  Biopsies were taken.  A medium-size hiatal hernia was present.  Stomach was normal.  The examined duodenum was normal.  Await pathology results.  Use Protonix 40 mg b.i.d..    Hyperlipidemia     Hypertension     Hypothyroidism      Social History[1]  Past Surgical History:   Procedure Laterality Date    ADENOIDECTOMY      COLONOSCOPY      TONSILLECTOMY       Family History   Problem Relation Name Age of Onset    Pneumonia Mother natalee Clayton Ran     COPD Mother natalee Clayton Ran         smoker    Hypertension Father Nico Reina     Suicide Father Nico Reina        Objective:      Physical Exam  Blood pressure 134/64, pulse 66, height 5' 9" (1.753 m), weight 67.4 kg (148 lb 9.4 oz). Body mass index " is 21.94 kg/m².  Physical Exam    Vitals: Blood pressure: 137/73. Blood pressure: 134/64. Pulse: 66.  General: No acute distress. Well-developed. Well-nourished.  Eyes: EOMI. Sclerae anicteric.  Cardiovascular: Regular rate. Regular rhythm. No murmurs. No rubs. No gallops. Normal S1, S2.  Respiratory: Normal respiratory effort. Clear to auscultation bilaterally. No rales. No rhonchi. No wheezing.  Abdomen soft no rigidity or rebound  Musculoskeletal: No  obvious deformity.  Extremities: No lower extremity edema.  Neurological: Alert & oriented   Psychiatric: Normal mood. Normal affect. Good insight. Good judgment.  Skin: Warm. Dry.              Assessment:       Lab Visit on 05/30/2025   Component Date Value Ref Range Status    Sodium 05/30/2025 139  136 - 145 mmol/L Final    Potassium 05/30/2025 4.5  3.5 - 5.1 mmol/L Final    Chloride 05/30/2025 109  95 - 110 mmol/L Final    CO2 05/30/2025 22 (L)  23 - 29 mmol/L Final    Glucose 05/30/2025 104  70 - 110 mg/dL Final    BUN 05/30/2025 39 (H)  8 - 23 mg/dL Final    Creatinine 05/30/2025 2.2 (H)  0.5 - 1.4 mg/dL Final    Calcium 05/30/2025 8.8  8.7 - 10.5 mg/dL Final    Albumin 05/30/2025 3.8  3.5 - 5.2 g/dL Final    Phosphorus Level 05/30/2025 3.5  2.7 - 4.5 mg/dL Final    Anion Gap 05/30/2025 8  8 - 16 mmol/L Final    eGFR 05/30/2025 30 (L)  >60 mL/min/1.73/m2 Final    Color, UA 05/30/2025 Yellow  Yellow, Straw, Candace Final    Appearance, UA 05/30/2025 Clear  Clear Final    Spec Grav UA 05/30/2025 1.015  1.005 - 1.030 Final    pH, UA 05/30/2025 5.0  5.0 - 8.0 Final    Protein, UA 05/30/2025 Negative  Negative Final    Glucose, UA 05/30/2025 3+ (A)  Negative Final    Ketones, UA 05/30/2025 Negative  Negative Final    Blood, UA 05/30/2025 Negative  Negative Final    Bilirubin, UA 05/30/2025 Negative  Negative Final    Urobilinogen, UA 05/30/2025 Negative  <2.0 EU/dL Final    Nitrites, UA 05/30/2025 Negative  Negative Final    Leukocyte Esterase, UA 05/30/2025 Negative   Negative Final    Urine Microalbumin 05/30/2025 <7.0  <=19.8 ug/mL Final    Urine Creatinine 05/30/2025 105.4  23.0 - 375.0 mg/dL Final    Microalbumin/Creatinine Ratio Urine 05/30/2025    Final    Urine Protein 05/30/2025 24 (H)  <=15 mg/dL Final    Urine Creatinine 05/30/2025 104.7  23.0 - 375.0 mg/dL Final    Urine Protein/Creatinine Ratio 05/30/2025 0.23 (H)  <=0.20 Final    PTH Intact 05/30/2025 66.7  9.0 - 77.0 pg/mL Final    Vitamin D 05/30/2025 47  30 - 96 ng/mL Final    Magnesium 05/30/2025 2.2  1.6 - 2.6 mg/dL Final    Uric Acid 05/30/2025 4.7  3.4 - 7.0 mg/dL Final    WBC 05/30/2025 7.97  3.90 - 12.70 K/uL Final    RBC 05/30/2025 3.83 (L)  4.60 - 6.20 M/uL Final    Hgb 05/30/2025 12.0 (L)  14.0 - 18.0 gm/dL Final    Hct 05/30/2025 37.9 (L)  40.0 - 54.0 % Final    MCV 05/30/2025 99 (H)  82 - 98 fL Final    MCH 05/30/2025 31.3 (H)  27.0 - 31.0 pg Final    MCHC 05/30/2025 31.7 (L)  32.0 - 36.0 g/dL Final    RDW 05/30/2025 13.2  11.5 - 14.5 % Final    Platelet Count 05/30/2025 363  150 - 450 K/uL Final    MPV 05/30/2025 9.3  9.2 - 12.9 fL Final    Nucleated RBC 05/30/2025 0  <=0 /100 WBC Final    Neut % 05/30/2025 64.5  38 - 73 % Final    Lymph % 05/30/2025 21.6  18 - 48 % Final    Mono % 05/30/2025 11.4  4 - 15 % Final    Eos % 05/30/2025 1.1  0 - 8 % Final    Basophil % 05/30/2025 1.0  <=1.9 % Final    Imm Grans % 05/30/2025 0.4  0.0 - 0.5 % Final    Neut # 05/30/2025 5.1  1.8 - 7.7 K/uL Final    Lymph # 05/30/2025 1.72  1 - 4.8 K/uL Final    Mono # 05/30/2025 0.91  0.3 - 1 K/uL Final    Eos # 05/30/2025 0.09  <=0.5 K/uL Final    Baso # 05/30/2025 0.08  <=0.2 K/uL Final    Imm Grans # 05/30/2025 0.03  0.00 - 0.04 K/uL Final    RBC, UA 05/30/2025 <1  <=4 /HPF Final    WBC, UA 05/30/2025 <1  <=5 /HPF Final    Microscopic Comment 05/30/2025    Final     Component Ref Range & Units 10 mo ago 1 yr ago 2 yr ago 3 yr ago 4 yr ago 5 yr ago 7 yr ago   TSH 0.340 - 5.600 uIU/mL 1.194 0.288 Low  0.390 0.580 0.460 0.680  0.32 R     Component Ref Range & Units 1 mo ago  (5/30/25) 5 mo ago  (1/24/25) 11 mo ago  (8/12/24) 1 yr ago  (1/25/24) 1 yr ago  (8/23/23) 2 yr ago  (5/12/23)   Uric Acid 3.4 - 7.0 mg/dL 4.7 4.9 6.8 6.2 5.9 5.2     BUN 8 - 23 mg/dL 39 High  43 High  45 High  46 High  45 High  41 High  40 High    Creatinine 0.5 - 1.4 mg/dL 2.2 High  2.2 High  2.3 High  2.2 High  2.0 High  2.2 High       Urine Microalbumin <=19.8 ug/mL <7.0   15.2 R 7.8 R  <7.0 R   Urine Creatinine 23.0 - 375.0 mg/dL 105.4 104.7 .4 .4 .3 .3 CM <1.0 Low        Assessment & Plan    N18.32 STAGE 3B CHRONIC KIDNEY DISEASE:  - Chronic renal disease stage 3B currently managed with Losartan and Jardiance.  - Continue Jardiance 10 mg daily for kidney protection.  - Ordered basic metabolic panel.    Z01.818 PREOP EXAM FOR INTERNAL MEDICINE:  - Medically stable for proposed cataract surgery.  - Scheduled with Dr. Ling Valladares, starting with the right eye on August 13th under monitored anesthesia care.    I10 BENIGN ESSENTIAL HYPERTENSION:  - Continue Losartan for BP management.  - HTN control assessed; BP initially elevated but settled during visit.  - Considered adding amlodipine but decided against it after observing stabilization.  - Jaswinder to check BP at home regularly.    D64.9 NORMOCYTIC NORMOCHROMIC ANEMIA:  - Chronic anemia likely secondary to CKD.  - Not on aspirin; will discuss potential need with nephrology.  - Ordered CBC.    E03.9 ACQUIRED HYPOTHYROIDISM:  - Continue levothyroxine 88 mcg daily.  - Thyroid function previously stable on current dose.  - Ordered thyroid stimulating hormone (TSH).    K21.00 GASTROESOPHAGEAL REFLUX DISEASE WITH ESOPHAGITIS WITHOUT HEMORRHAGE:  - Reflux symptoms stable without current medication.         Plan:   Preop exam for internal medicine  Comments:  Mr. Reina is proposed to have a cataract surgery right eye on August 13th under monitored anesthesia care.    Stage 3b chronic kidney  disease  Comments:  Chronic kidney disease is stable at this point as he continues on losartan and Jardiance with hydration.  Follows up with Nephrology.  Orders:  -     CBC Auto Differential; Future; Expected date: 07/17/2025    Benign essential hypertension  Comments:  Blood pressures are somewhat elevated as he continues on losartan.  Orders:  -     Basic Metabolic Panel; Future; Expected date: 07/17/2025    Normocytic normochromic anemia  Comments:  Chronic anemia has been noted which I suspect is secondary to CKD.  Continue to follow up with Nephrology.    Acquired hypothyroidism  Comments:  Last year his thyroid test was stable and he continues on levothyroxine 88 and will check again.  Orders:  -     TSH; Future; Expected date: 07/17/2025    Gastroesophageal reflux disease with esophagitis without hemorrhage  Comments:  Reflux symptoms are stable and he is currently not on any medications.    Overall Bill is doing okay.  Proposed cataract surgery has been noted to be done by Dr. Mo Valladares starting with the right eye on August 13th.  His blood pressures though were elevated at arrival but subsequently seemed to settle down.  He does have chronic kidney disease at least stage IIIB and he continues with measures to read chart its progress.  He is also on Jardiance 10 mg.  Whether he may benefit from Kerendia and future remains to be seen.    Whether he needs aspirin or not will be discussed with Nephrology.  Will check a TSH for thyroid since the last 1 was done 1 year back.    Will check a blood count, basic metabolic panel and TSH.  Take the losartan and levothyroxine with a sip of water on the day of surgery.  Not take the Jardiance on the day of surgery but can resume after the surgery  Basic labs ordered.    Follow up in about 6 months (around 1/16/2026), or if symptoms worsen or fail to improve, for Hypertension/lipids/CKD.    Current Medications[2]    This note was generated with the assistance of  ambient listening technology. Verbal consent was obtained by the patient and accompanying visitor(s) for the recording of patient appointment to facilitate this note. I attest to having reviewed and edited the generated note for accuracy, though some syntax or spelling errors may persist. Please contact the author of this note for any clarification.      Kameron Akhtar           [1]   Social History  Socioeconomic History    Marital status: Single    Number of children: 0   Occupational History    Occupation:  [employer] retd   Tobacco Use    Smoking status: Former     Current packs/day: 0.00     Average packs/day: 2.0 packs/day for 11.0 years (22.0 ttl pk-yrs)     Types: Cigarettes     Start date: 1962     Quit date: 1973     Years since quittin.5    Smokeless tobacco: Never   Substance and Sexual Activity    Alcohol use: Yes     Alcohol/week: 1.0 - 2.0 standard drink of alcohol     Types: 1 - 2 Glasses of wine per week     Comment: occ    Drug use: No    Sexual activity: Not Currently     Partners: Female     Comment: Not sexually active     Social Drivers of Health     Financial Resource Strain: Low Risk  (7/10/2025)    Overall Financial Resource Strain (CARDIA)     Difficulty of Paying Living Expenses: Not hard at all   Food Insecurity: No Food Insecurity (7/10/2025)    Hunger Vital Sign     Worried About Running Out of Food in the Last Year: Never true     Ran Out of Food in the Last Year: Never true   Transportation Needs: No Transportation Needs (7/10/2025)    PRAPARE - Transportation     Lack of Transportation (Medical): No     Lack of Transportation (Non-Medical): No   Physical Activity: Sufficiently Active (7/10/2025)    Exercise Vital Sign     Days of Exercise per Week: 5 days     Minutes of Exercise per Session: 90 min   Stress: No Stress Concern Present (7/10/2025)    [employer] [place] [employer] - Occupational Stress Questionnaire     Feeling of Stress : Not at all    Housing Stability: Low Risk  (7/10/2025)    Housing Stability Vital Sign     Unable to Pay for Housing in the Last Year: No     Number of Times Moved in the Last Year: 0     Homeless in the Last Year: No   [2]   Current Outpatient Medications:     JARDIANCE 10 mg tablet, Take 10 mg by mouth once daily., Disp: , Rfl:     levothyroxine (SYNTHROID) 88 MCG tablet, Take 1 tablet (88 mcg total) by mouth before breakfast., Disp: 90 tablet, Rfl: 3    losartan (COZAAR) 100 MG tablet, Take 1 tablet (100 mg total) by mouth once daily. Med prescribed by Nephrology Ms Howie duran, Disp: , Rfl:

## 2025-07-16 NOTE — LETTER
July 16, 2025    Ling Valladares MD  [address] [address]     [facility]  [address]  [place] [address]  Phone: [phone]  Fax: [phone]   Patient: Jaswinder Reina   MR Number: [id number]   YOB: 1945   Date of Visit: 7/16/2025   Dear Dr. Valladares:    Thank you for referring Jaswinder Reina to me for evaluation. Attached you will find relevant portions of my assessment and plan of care.    His medical issues include the following:-    1.-hypertension controlled with losartan 100 mg per day.    2.-chronic kidney disease stage IIIB currently on Jardiance 10 mg per day.  3.-hypothyroidism currently stable on levothyroxine.  4.-chronic anemia secondary to CKD    Thus far he has not been diagnosed with the any major cardiac or pulmonary issue.    I will check a set of labs including basic metabolic panel, thyroid and blood counts.    He is not on aspirin.    Expecting the labs to be within acceptable range, his medically stable for proposed cataract surgery.    If you have questions, please do not hesitate to call me. I look forward to following Jaswinder Reina along with you.    Sincerely,      Kameron Akhtar MD      CC  No Recipients    Enclosure

## 2025-07-17 ENCOUNTER — APPOINTMENT (OUTPATIENT)
Dept: LAB | Facility: HOSPITAL | Age: 80
End: 2025-07-17
Attending: INTERNAL MEDICINE
Payer: COMMERCIAL

## 2025-07-17 DIAGNOSIS — N18.32 STAGE 3B CHRONIC KIDNEY DISEASE: ICD-10-CM

## 2025-07-17 DIAGNOSIS — E03.9 ACQUIRED HYPOTHYROIDISM: ICD-10-CM

## 2025-07-17 DIAGNOSIS — I10 BENIGN ESSENTIAL HYPERTENSION: ICD-10-CM

## 2025-07-17 LAB
ABSOLUTE EOSINOPHIL (OHS): 0.24 K/UL
ABSOLUTE MONOCYTE (OHS): 1.12 K/UL (ref 0.3–1)
ABSOLUTE NEUTROPHIL COUNT (OHS): 5.89 K/UL (ref 1.8–7.7)
ANION GAP (OHS): 10 MMOL/L (ref 8–16)
BASOPHILS # BLD AUTO: 0.1 K/UL
BASOPHILS NFR BLD AUTO: 1.1 %
BUN SERPL-MCNC: 45 MG/DL (ref 8–23)
CALCIUM SERPL-MCNC: 9 MG/DL (ref 8.7–10.5)
CHLORIDE SERPL-SCNC: 111 MMOL/L (ref 95–110)
CO2 SERPL-SCNC: 18 MMOL/L (ref 23–29)
CREAT SERPL-MCNC: 2.4 MG/DL (ref 0.5–1.4)
ERYTHROCYTE [DISTWIDTH] IN BLOOD BY AUTOMATED COUNT: 13.2 % (ref 11.5–14.5)
GFR SERPLBLD CREATININE-BSD FMLA CKD-EPI: 27 ML/MIN/1.73/M2
GLUCOSE SERPL-MCNC: 72 MG/DL (ref 70–110)
HCT VFR BLD AUTO: 40.5 % (ref 40–54)
HGB BLD-MCNC: 12.7 GM/DL (ref 14–18)
IMM GRANULOCYTES # BLD AUTO: 0.03 K/UL (ref 0–0.04)
IMM GRANULOCYTES NFR BLD AUTO: 0.3 % (ref 0–0.5)
LYMPHOCYTES # BLD AUTO: 1.81 K/UL (ref 1–4.8)
MCH RBC QN AUTO: 31.1 PG (ref 27–31)
MCHC RBC AUTO-ENTMCNC: 31.4 G/DL (ref 32–36)
MCV RBC AUTO: 99 FL (ref 82–98)
NUCLEATED RBC (/100WBC) (OHS): 0 /100 WBC
PLATELET # BLD AUTO: 415 K/UL (ref 150–450)
PMV BLD AUTO: 10.5 FL (ref 9.2–12.9)
POTASSIUM SERPL-SCNC: 4.3 MMOL/L (ref 3.5–5.1)
RBC # BLD AUTO: 4.08 M/UL (ref 4.6–6.2)
RELATIVE EOSINOPHIL (OHS): 2.6 %
RELATIVE LYMPHOCYTE (OHS): 19.7 % (ref 18–48)
RELATIVE MONOCYTE (OHS): 12.2 % (ref 4–15)
RELATIVE NEUTROPHIL (OHS): 64.1 % (ref 38–73)
SODIUM SERPL-SCNC: 139 MMOL/L (ref 136–145)
TSH SERPL-ACNC: 2.56 UIU/ML (ref 0.4–4)
WBC # BLD AUTO: 9.19 K/UL (ref 3.9–12.7)

## 2025-07-17 PROCEDURE — 36415 COLL VENOUS BLD VENIPUNCTURE: CPT | Mod: PN

## 2025-07-17 PROCEDURE — 80048 BASIC METABOLIC PNL TOTAL CA: CPT

## 2025-07-17 PROCEDURE — 85025 COMPLETE CBC W/AUTO DIFF WBC: CPT

## 2025-07-17 PROCEDURE — 84443 ASSAY THYROID STIM HORMONE: CPT

## 2025-07-18 NOTE — LETTER
July 18, 2025      Ling Valladares MD  [address] [address]       [facility]  [address]  [place] [address]  Phone: [phone]  Fax: [phone]   Patient: Jaswinder Reina   MR Number: [id number]   YOB: 1945   Date of Visit: 7/18/2025       Dear Dr. Valladares:    Thank you for referring Jaswinder Reina to me for evaluation. Below are the relevant portions of my assessment and plan of care.      Labs are back and has been reviewed.     Patient's labs has been reviewed and are and chronic abnormal but acceptable range.  Patient has been given instructions.  He should hold his medication for diabetes  Jardiance on the day of surgery.       If you have questions, please do not hesitate to call me. I look forward to following Jaswinder along with you.    Sincerely,      Kameron Akhtar MD           CC  No Recipients

## 2025-07-18 NOTE — PROGRESS NOTES
July 18, 2025      Ling Valladares MD  [address] [address]       [facility]  [address]  [place] [address]  Phone: [phone]  Fax: [phone]   Patient: Jaswinder Reina   MR Number: [id number]   YOB: 1945   Date of Visit: 7/18/2025       Dear Dr. Valladares:    Thank you for referring Jaswinder Reina to me for evaluation. Below are the relevant portions of my assessment and plan of care.      Labs are back and has been reviewed.     Patient's labs has been reviewed and are and chronic abnormal but acceptable range.  Patient has been given instructions.  He should hold his medication for diabetes  Jardiance on the day of surgery.       If you have questions, please do not hesitate to call me. I look forward to following Jaswinder along with you.    Sincerely,      Kameron Akhtar MD           CC  No Recipients